# Patient Record
Sex: FEMALE | Race: WHITE | NOT HISPANIC OR LATINO | Employment: UNEMPLOYED | URBAN - METROPOLITAN AREA
[De-identification: names, ages, dates, MRNs, and addresses within clinical notes are randomized per-mention and may not be internally consistent; named-entity substitution may affect disease eponyms.]

---

## 2017-03-21 DIAGNOSIS — F43.21 ADJUSTMENT DISORDER WITH DEPRESSED MOOD: ICD-10-CM

## 2017-03-21 NOTE — TELEPHONE ENCOUNTER
Sertraline     Last Written Prescription Date: 2/1/2016  Last Fill Quantity: 90, # refills: 3  Last Office Visit with G primary care provider: 11/17/2016 SP        Last PHQ-9 score on record=   PHQ-9 SCORE 11/17/2016   Total Score -   Total Score 1

## 2017-06-28 DIAGNOSIS — F43.21 ADJUSTMENT DISORDER WITH DEPRESSED MOOD: ICD-10-CM

## 2017-06-28 NOTE — TELEPHONE ENCOUNTER
Sertraline     Last Written Prescription Date: 03/27/016  Last Fill Quantity: 90, # refills: 0  Last Office Visit with [facility] primary care provider:  11/17/2016        Last PHQ-9 score on record=   PHQ-9 SCORE 11/17/2016   Total Score -   Total Score 1     PHQ-9 SCORE 3/19/2015 4/12/2016 11/17/2016   Total Score 3 - -   Total Score - 4 1     JUNG-7 SCORE 9/11/2014 4/12/2016 11/17/2016   Total Score 3 - -   Total Score - 1 0       Omer STOKES (R)

## 2017-07-10 ENCOUNTER — OFFICE VISIT (OUTPATIENT)
Dept: FAMILY MEDICINE | Facility: CLINIC | Age: 32
End: 2017-07-10
Payer: COMMERCIAL

## 2017-07-10 VITALS
WEIGHT: 180.6 LBS | HEART RATE: 72 BPM | BODY MASS INDEX: 30.09 KG/M2 | HEIGHT: 65 IN | DIASTOLIC BLOOD PRESSURE: 60 MMHG | SYSTOLIC BLOOD PRESSURE: 90 MMHG

## 2017-07-10 DIAGNOSIS — Z30.41 ENCOUNTER FOR SURVEILLANCE OF CONTRACEPTIVE PILLS: ICD-10-CM

## 2017-07-10 DIAGNOSIS — F43.21 ADJUSTMENT DISORDER WITH DEPRESSED MOOD: Primary | ICD-10-CM

## 2017-07-10 DIAGNOSIS — K21.9 GASTROESOPHAGEAL REFLUX DISEASE WITHOUT ESOPHAGITIS: ICD-10-CM

## 2017-07-10 PROCEDURE — 99213 OFFICE O/P EST LOW 20 MIN: CPT | Performed by: FAMILY MEDICINE

## 2017-07-10 RX ORDER — NICOTINE POLACRILEX 4 MG/1
20 GUM, CHEWING ORAL DAILY
Qty: 90 TABLET | Refills: 3 | Status: SHIPPED | OUTPATIENT
Start: 2017-07-10 | End: 2020-04-20

## 2017-07-10 RX ORDER — ACETAMINOPHEN AND CODEINE PHOSPHATE 120; 12 MG/5ML; MG/5ML
1 SOLUTION ORAL DAILY
Qty: 84 TABLET | Refills: 3 | Status: SHIPPED | OUTPATIENT
Start: 2017-07-10 | End: 2018-07-21

## 2017-07-10 ASSESSMENT — ANXIETY QUESTIONNAIRES
IF YOU CHECKED OFF ANY PROBLEMS ON THIS QUESTIONNAIRE, HOW DIFFICULT HAVE THESE PROBLEMS MADE IT FOR YOU TO DO YOUR WORK, TAKE CARE OF THINGS AT HOME, OR GET ALONG WITH OTHER PEOPLE: NOT DIFFICULT AT ALL
2. NOT BEING ABLE TO STOP OR CONTROL WORRYING: NOT AT ALL
7. FEELING AFRAID AS IF SOMETHING AWFUL MIGHT HAPPEN: NOT AT ALL
6. BECOMING EASILY ANNOYED OR IRRITABLE: SEVERAL DAYS
5. BEING SO RESTLESS THAT IT IS HARD TO SIT STILL: NOT AT ALL
GAD7 TOTAL SCORE: 1
1. FEELING NERVOUS, ANXIOUS, OR ON EDGE: NOT AT ALL
3. WORRYING TOO MUCH ABOUT DIFFERENT THINGS: NOT AT ALL

## 2017-07-10 ASSESSMENT — PATIENT HEALTH QUESTIONNAIRE - PHQ9: 5. POOR APPETITE OR OVEREATING: NOT AT ALL

## 2017-07-10 NOTE — PROGRESS NOTES
Subjective:  Obdulia Black is a 32 year old female   Chief Complaint   Patient presents with     Depression     recheck/refill on medication     In general she is doing well with her depression and anxiety. Her 11-month-old infant is still waking up every 2 or 3 hours during the night and won't settle herself, so Obdulia ends up going in and rocking her to see back to sleep. Because of this her sleep is disrupted and she does have ongoing fatigue and tiredness. She has tried letting the baby cry himself back to sleep but it has not been effective to this point. Current symptoms include:  PHQ-9 ([employer]) 11/17/2016   No Interest In Doing Things    Feeling Depressed    Trouble Sleeping    Tired / No Energy    No appetite or Over-Eating    Feeling Bad about Self    Trouble Concentrating    Moving Slow or Restless    Suicidal Thoughts    Total Score    1.  Little interest or pleasure in doing things 0   2.  Feeling down, depressed, or hopeless 0   3.  Trouble falling or staying asleep, or sleeping too much 0   4.  Feeling tired or having little energy 1   5.  Poor appetite or overeating 0   6.  Feeling bad about yourself 0   7.  Trouble concentrating 0   8.  Moving slowly or restless 0   9.  Suicidal or self-harm thoughts 0   PHQ-9 Total Score 1   Difficulty at work, home, or with people Not difficult at all     PHQ-9 ([employer]) 7/10/2017   No Interest In Doing Things    Feeling Depressed    Trouble Sleeping    Tired / No Energy    No appetite or Over-Eating    Feeling Bad about Self    Trouble Concentrating    Moving Slow or Restless    Suicidal Thoughts    Total Score    1.  Little interest or pleasure in doing things 0   2.  Feeling down, depressed, or hopeless 1   3.  Trouble falling or staying asleep, or sleeping too much 1   4.  Feeling tired or having little energy 2   5.  Poor appetite or overeating 0   6.  Feeling bad about yourself 0   7.  Trouble concentrating 0   8.  Moving slowly or restless 0   9.  Suicidal or  self-harm thoughts 0   PHQ-9 Total Score 4   Difficulty at work, home, or with people Not difficult at all     JUNG-7   [employer], 2002; Used with Permission) 7/10/2017   Over the last 2 weeks, how often have you been bothered by feeling nervous, anxious or on edge?    Over the last 2 weeks, how often have you been bothered by not being able to stop or control worrying?    Over the last 2 weeks, how often have you been bothered by worrying too much about different things?    Over the last 2 weeks, how often have you been bothered by trouble relaxing?    Over the last 2 weeks, how often have you been bothered by being so restless that it is hard to sit still?    Over the last 2 weeks, how often have you been bothered by becoming easily annoyed or irritable?    Over the last 2 weeks, how often have you been bothered by feeling afraid as if something awful might happen?    JUNG-7 Total Score =     1. Feeling nervous, anxious, or on edge 0   2. Not being able to stop or control worrying 0   3. Worrying too much about different things 0   4. Trouble relaxing 0   5. Being so restless that it is hard to sit still 0   6. Becoming easily annoyed or irritable 1   7. Feeling afraid, as if something awful might happen 0   JUNG-7 Total Score 1   If you checked any problems, how difficult have they made it for you to do your work, take care of things at home, or get along with other people? Not difficult at all       She also has to fight could refill her birth control pills. She did have a Pap smear at her postpartum visit in September and her Pap smear is normal. The pill is working well without side effects    Encounter Diagnoses   Name Primary?     Adjustment disorder with depressed mood Yes     Gastroesophageal reflux disease without esophagitis      Encounter for surveillance of contraceptive pills        ROS:other than noted above, general, HEENT, respiratory, cardiac, gastrointestinal systems are negative    Medical,  surgical, social, and family histories, medications and allergies reviewed and updated.    Objective:  Exam:    GENERAL APPEARANCE ADULT: Alert, no acute distress  EYES: PERRL, EOM normal, conjunctiva and lids normal  PSYCH: mentation appears normal., affect and mood normal      ASSESSMENT:  1. Adjustment disorder with depressed mood    2. Gastroesophageal reflux disease without esophagitis    3. Encounter for surveillance of contraceptive pills        PLAN:  Orders Placed This Encounter     sertraline (ZOLOFT) 50 MG tablet     omeprazole 20 MG tablet     norethindrone (MICRONOR) 0.35 MG per tablet       Patient Instructions   Continue the same medications.   Recheck in a year, sooner if any problems

## 2017-07-10 NOTE — MR AVS SNAPSHOT
After Visit Summary   7/10/2017    Obdulia Black    MRN: [id number]           Patient Information     Date Of Birth          1985        Visit Information        Provider Department      7/10/2017 8:00 AM HARIS Dickey MD [facility]        Today's Diagnoses     Adjustment disorder with depressed mood        Gastroesophageal reflux disease without esophagitis        Routine postpartum follow-up          Care Instructions    Continue the same medications.   Recheck in a year, sooner if any problems          Follow-ups after your visit        Who to contact     If you have questions or need follow up information about today's clinic visit or your schedule please contact [facility] directly at [phone].  Normal or non-critical lab and imaging results will be communicated to you by MyChart, letter or phone within 4 business days after the clinic has received the results. If you do not hear from us within 7 days, please contact the clinic through [employer] or phone. If you have a critical or abnormal lab result, we will notify you by phone as soon as possible.  Submit refill requests through [employer] or call your pharmacy and they will forward the refill request to us. Please allow 3 business days for your refill to be completed.          Additional Information About Your Visit        MyChart Information     [employer] gives you secure access to your electronic health record. If you see a primary care provider, you can also send messages to your care team and make appointments. If you have questions, please call your primary care clinic.  If you do not have a primary care provider, please call [phone] and they will assist you.        Care EveryWhere ID     This is your Care EveryWhere ID. This could be used by other organizations to access your [place] medical records  [id number]        Your Vitals Were     Pulse Height BMI (Body Mass Index)             72 5'  "4.5\" (1.638 m) 30.52 kg/m2          Blood Pressure from Last 3 Encounters:   07/10/17 90/60   11/17/16 116/71   09/21/16 121/72    Weight from Last 3 Encounters:   07/10/17 180 lb 9.6 oz (81.9 kg)   11/17/16 189 lb 9.6 oz (86 kg)   09/21/16 183 lb 3.2 oz (83.1 kg)              Today, you had the following     No orders found for display         Today's Medication Changes          These changes are accurate as of: 7/10/17  8:22 AM.  If you have any questions, ask your nurse or doctor.               These medicines have changed or have updated prescriptions.        Dose/Directions    sertraline 50 MG tablet   Commonly known as:  ZOLOFT   This may have changed:  See the new instructions.   Used for:  Adjustment disorder with depressed mood        Dose:  50 mg   Take 1 tablet (50 mg) by mouth daily   Quantity:  90 tablet   Refills:  3            Where to get your medicines      These medications were sent to [id number] [address] [address]    Hours:  Closed 10-16-08 business to [facility] Phone:  [phone]     norethindrone 0.35 MG per tablet    omeprazole 20 MG tablet    sertraline 50 MG tablet                Primary Care Provider Office Phone # Fax #    R Kennedy Dickey [phone]       [address] [address]        Equal Access to Services     NANCI CASEY AH: Hadii ricardo varma hadasho Soomaali, waaxda luqadaha, qaybta kaalmada adeegyada, ranjeet heredia. So [facility] [phone].    ATENCIÓN: Si habla royceañol, tiene a fox disposición servicios gratuitos de asistencia lingüística. Llame al [phone].    We comply with applicable federal civil rights laws and [place] laws. We do not discriminate on the basis of race, color, national origin, age, disability sex, sexual orientation or gender identity.            Thank you!     Thank you for choosing [facility]  for your care. Our goal is always to provide you with excellent care. Hearing back from our patients is one way we can continue to improve our services. Please take a few minutes to complete the written survey that you may receive in the mail after your visit with us. Thank you!             Your Updated Medication List - Protect others around you: Learn how to safely use, store and throw away your medicines at www.disposemymeds.org.          This list is accurate as of: 7/10/17  8:22 AM.  Always use your most recent med list.                   Brand Name Dispense Instructions for use Diagnosis    cetirizine 10 MG tablet    zyrTEC    30 tablet    Take 1 tablet (10 mg) by mouth every evening        fluticasone 50 MCG/ACT spray    FLONASE    1 Package    Spray 2 sprays in nostril daily    Allergic rhinitis       ibuprofen 200 MG tablet    ADVIL/MOTRIN     Take 3 tablets (600 mg) by mouth every 6 hours as needed for mild pain    Vaginal delivery       norethindrone 0.35 MG per tablet    MICRONOR    84 tablet    Take 1 tablet (0.35 mg) by mouth daily    Routine postpartum follow-up       omeprazole 20 MG tablet     90 tablet    Take 1 tablet (20 mg) by mouth daily    Gastroesophageal reflux disease without esophagitis       prenatal multivitamin  plus iron 27-0.8 MG Tabs per tablet      Take 1 tablet by mouth daily        sertraline 50 MG tablet    ZOLOFT    90 tablet    Take 1 tablet (50 mg) by mouth daily    Adjustment disorder with depressed mood

## 2017-07-10 NOTE — NURSING NOTE
"Chief Complaint   Patient presents with     Depression     recheck/refill on medication       Initial BP 90/60 (BP Location: Right arm, Patient Position: Chair, Cuff Size: Adult Large)  Pulse 72  Ht 5' 4.5\" (1.638 m)  Wt 180 lb 9.6 oz (81.9 kg)  BMI 30.52 kg/m2 Estimated body mass index is 30.52 kg/(m^2) as calculated from the following:    Height as of this encounter: 5' 4.5\" (1.638 m).    Weight as of this encounter: 180 lb 9.6 oz (81.9 kg).  Medication Reconciliation: complete     Mary Cristina, CMA      "

## 2017-07-11 ASSESSMENT — ASTHMA QUESTIONNAIRES: ACT_TOTALSCORE: 25

## 2017-07-11 ASSESSMENT — ANXIETY QUESTIONNAIRES: GAD7 TOTAL SCORE: 1

## 2017-07-11 ASSESSMENT — PATIENT HEALTH QUESTIONNAIRE - PHQ9: SUM OF ALL RESPONSES TO PHQ QUESTIONS 1-9: 4

## 2018-04-04 ENCOUNTER — APPOINTMENT (OUTPATIENT)
Dept: OCCUPATIONAL MEDICINE | Facility: CLINIC | Age: 33
End: 2018-04-04

## 2018-04-04 PROCEDURE — 86580 TB INTRADERMAL TEST: CPT | Performed by: PHYSICIAN ASSISTANT

## 2018-04-16 ENCOUNTER — APPOINTMENT (OUTPATIENT)
Dept: OCCUPATIONAL MEDICINE | Facility: CLINIC | Age: 33
End: 2018-04-16

## 2018-04-16 PROCEDURE — 86580 TB INTRADERMAL TEST: CPT | Performed by: PHYSICIAN ASSISTANT

## 2018-07-17 DIAGNOSIS — K21.9 ESOPHAGEAL REFLUX: Primary | ICD-10-CM

## 2018-07-17 NOTE — TELEPHONE ENCOUNTER
"Requested Prescriptions   Pending Prescriptions Disp Refills     omeprazole (PRILOSEC) 20 MG CR capsule [Pharmacy Med Name: OMEPRAZOLE 20 MG    CAP APOT]  Last Written Prescription Date:  7/10/2017  Last Fill Quantity: 90,  # refills: 3   Last office visit: 7/10/2017 with prescribing provider:  Post   Future Office Visit:     90 capsule 2     Sig: TAKE ONE CAPSULE BY MOUTH ONE TIME DAILY    PPI Protocol Failed    7/17/2018 12:07 PM       Failed - Recent (12 mo) or future (30 days) visit within the authorizing provider's specialty    Patient had office visit in the last 12 months or has a visit in the next 30 days with authorizing provider or within the authorizing provider's specialty.  See \"Patient Info\" tab in inbasket, or \"Choose Columns\" in Meds & Orders section of the refill encounter.           Passed - Not on Clopidogrel (unless Pantoprazole ordered)       Passed - No diagnosis of osteoporosis on record       Passed - Patient is age 18 or older       Passed - No active pregnacy on record       Passed - No positive pregnancy test in past 12 months          "

## 2018-07-21 DIAGNOSIS — Z30.41 ENCOUNTER FOR SURVEILLANCE OF CONTRACEPTIVE PILLS: ICD-10-CM

## 2018-07-24 RX ORDER — ACETAMINOPHEN AND CODEINE PHOSPHATE 120; 12 MG/5ML; MG/5ML
1 SOLUTION ORAL DAILY
Qty: 84 TABLET | Refills: 0 | Status: SHIPPED | OUTPATIENT
Start: 2018-07-24 | End: 2018-10-15

## 2018-08-16 ENCOUNTER — OFFICE VISIT (OUTPATIENT)
Dept: FAMILY MEDICINE | Facility: CLINIC | Age: 33
End: 2018-08-16
Payer: COMMERCIAL

## 2018-08-16 VITALS
OXYGEN SATURATION: 100 % | BODY MASS INDEX: 33.52 KG/M2 | SYSTOLIC BLOOD PRESSURE: 102 MMHG | DIASTOLIC BLOOD PRESSURE: 70 MMHG | RESPIRATION RATE: 16 BRPM | HEIGHT: 63 IN | TEMPERATURE: 97.3 F | HEART RATE: 80 BPM | WEIGHT: 189.2 LBS

## 2018-08-16 DIAGNOSIS — K21.9 GASTROESOPHAGEAL REFLUX DISEASE WITHOUT ESOPHAGITIS: ICD-10-CM

## 2018-08-16 DIAGNOSIS — F43.21 ADJUSTMENT DISORDER WITH DEPRESSED MOOD: ICD-10-CM

## 2018-08-16 PROCEDURE — 99214 OFFICE O/P EST MOD 30 MIN: CPT | Performed by: FAMILY MEDICINE

## 2018-08-16 RX ORDER — SERTRALINE HYDROCHLORIDE 100 MG/1
100 TABLET, FILM COATED ORAL DAILY
Qty: 90 TABLET | Refills: 3 | Status: SHIPPED | OUTPATIENT
Start: 2018-08-16 | End: 2019-09-03

## 2018-08-16 ASSESSMENT — ANXIETY QUESTIONNAIRES
7. FEELING AFRAID AS IF SOMETHING AWFUL MIGHT HAPPEN: NOT AT ALL
1. FEELING NERVOUS, ANXIOUS, OR ON EDGE: NOT AT ALL
GAD7 TOTAL SCORE: 2
5. BEING SO RESTLESS THAT IT IS HARD TO SIT STILL: NOT AT ALL
IF YOU CHECKED OFF ANY PROBLEMS ON THIS QUESTIONNAIRE, HOW DIFFICULT HAVE THESE PROBLEMS MADE IT FOR YOU TO DO YOUR WORK, TAKE CARE OF THINGS AT HOME, OR GET ALONG WITH OTHER PEOPLE: NOT DIFFICULT AT ALL
2. NOT BEING ABLE TO STOP OR CONTROL WORRYING: NOT AT ALL
3. WORRYING TOO MUCH ABOUT DIFFERENT THINGS: NOT AT ALL
6. BECOMING EASILY ANNOYED OR IRRITABLE: MORE THAN HALF THE DAYS

## 2018-08-16 ASSESSMENT — PATIENT HEALTH QUESTIONNAIRE - PHQ9: 5. POOR APPETITE OR OVEREATING: NOT AT ALL

## 2018-08-16 NOTE — PROGRESS NOTES
Subjective:  Obdulia Black is a 33 year old female   Chief Complaint   Patient presents with     Depression     recheck/refill     Refill Request     for omeprazole and BCP     Depression has been a little bit worse.  Current symptoms include  PHQ-9 ([employer]) 8/16/2018   No Interest In Doing Things    Feeling Depressed    Trouble Sleeping    Tired / No Energy    No appetite or Over-Eating    Feeling Bad about Self    Trouble Concentrating    Moving Slow or Restless    Suicidal Thoughts    Total Score    1.  Little interest or pleasure in doing things 1   2.  Feeling down, depressed, or hopeless 1   3.  Trouble falling or staying asleep, or sleeping too much 2   4.  Feeling tired or having little energy 2   5.  Poor appetite or overeating 0   6.  Feeling bad about yourself 1   7.  Trouble concentrating 0   8.  Moving slowly or restless 0   9.  Suicidal or self-harm thoughts 0   PHQ-9 Total Score 7   Difficulty at work, home, or with people Somewhat difficult   She denies any significant side effects from sertraline    GERD: This is well controlled as long as she takes the omeprazole    Encounter Diagnoses   Name Primary?     Gastroesophageal reflux disease without esophagitis      Adjustment disorder with depressed mood        ROS:other than noted above, general, HEENT, respiratory, cardiac, gastrointestinal systems are negative    Medical, surgical, social, and family histories, medications and allergies reviewed and updated.    Objective:  Exam:    GENERAL APPEARANCE ADULT: Alert, no acute distress  EYES: PERRL, EOM normal, conjunctiva and lids normal  RESP: lungs clear to auscultation   CV: normal rate, regular rhythm, no murmur or gallop  ABDOMEN: soft, no organomegaly, masses or tenderness  PSYCH: mentation appears normal., affect and mood normal      ASSESSMENT:  1. Gastroesophageal reflux disease without esophagitis    2. Adjustment disorder with depressed mood        PLAN:  Orders Placed This Encounter      omeprazole (PRILOSEC) 20 MG CR capsule     sertraline (ZOLOFT) 100 MG tablet     We will increase the dose of sertraline to 100 mg daily to see if we can improve her depression symptoms.  Will follow up with a phone call in 6 weeks to see how this is working

## 2018-08-16 NOTE — MR AVS SNAPSHOT
"              After Visit Summary   8/16/2018    Obdulia Black    MRN: [id number]           Patient Information     Date Of Birth          1985        Visit Information        Provider Department      8/16/2018 10:00 AM HARIS Dickey MD [facility]        Today's Diagnoses     Gastroesophageal reflux disease without esophagitis        Adjustment disorder with depressed mood           Follow-ups after your visit        Who to contact     If you have questions or need follow up information about today's clinic visit or your schedule please contact [facility] directly at [phone].  Normal or non-critical lab and imaging results will be communicated to you by [employer], letter or phone within 4 business days after the clinic has received the results. If you do not hear from us within 7 days, please contact the clinic through [employer] or phone. If you have a critical or abnormal lab result, we will notify you by phone as soon as possible.  Submit refill requests through [employer] or call your pharmacy and they will forward the refill request to us. Please allow 3 business days for your refill to be completed.          Additional Information About Your Visit        MyChart Information     [employer] gives you secure access to your electronic health record. If you see a primary care provider, you can also send messages to your care team and make appointments. If you have questions, please call your primary care clinic.  If you do not have a primary care provider, please call [phone] and they will assist you.        Care EveryWhere ID     This is your Care EveryWhere ID. This could be used by other organizations to access your [place] medical records  [id number]        Your Vitals Were     Pulse Temperature Respirations Height Pulse Oximetry BMI (Body Mass Index)    80 97.3  F (36.3  C) (Tympanic) 16 5' 3\" (1.6 m) 100% 33.52 kg/m2       Blood Pressure from Last 3 Encounters: "   08/16/18 102/70   07/10/17 90/60   11/17/16 116/71    Weight from Last 3 Encounters:   08/16/18 189 lb 3.2 oz (85.8 kg)   07/10/17 180 lb 9.6 oz (81.9 kg)   11/17/16 189 lb 9.6 oz (86 kg)              Today, you had the following     No orders found for display         Today's Medication Changes          These changes are accurate as of 8/16/18 10:27 AM.  If you have any questions, ask your nurse or doctor.               These medicines have changed or have updated prescriptions.        Dose/Directions    * omeprazole 20 MG tablet   This may have changed:  Another medication with the same name was changed. Make sure you understand how and when to take each.   Used for:  Gastroesophageal reflux disease without esophagitis   Changed by:  HARIS Dickey MD        Dose:  20 mg   Take 1 tablet (20 mg) by mouth daily   Quantity:  90 tablet   Refills:  3       * omeprazole 20 MG CR capsule   Commonly known as:  priLOSEC   This may have changed:  See the new instructions.   Used for:  Gastroesophageal reflux disease without esophagitis   Changed by:  HARIS Dickey MD        Dose:  20 mg   Take 1 capsule (20 mg) by mouth daily   Quantity:  90 capsule   Refills:  3       sertraline 100 MG tablet   Commonly known as:  ZOLOFT   This may have changed:    - medication strength  - how much to take   Used for:  Adjustment disorder with depressed mood   Changed by:  HARIS Dickey MD        Dose:  100 mg   Take 1 tablet (100 mg) by mouth daily   Quantity:  90 tablet   Refills:  3       * Notice:  This list has 2 medication(s) that are the same as other medications prescribed for you. Read the directions carefully, and ask your doctor or other care provider to review them with you.         Where to get your medicines      These medications were sent to [id number] [address] [facility]  [address] [facility] [address]    Hours:  Closed 10-16-08 business to [facility] Phone:  [phone]      omeprazole 20 MG CR capsule    sertraline 100 MG tablet                Primary Care Provider Office Phone # Fax #    R Kennedy Dickey [phone] [address] [facility] [address]        Equal Access to Services     CHRISTINE CASEY : Hadii ricardo varma jose eduardoo Sojanali, waaxda luqadaha, qaybta kaalmada adelaneyada, ranjeet correian kylie novoa laTristinmahogany heredia. So [facility] [phone].    ATENCIÓN: Si habla español, tiene a fox disposición servicios gratuitos de asistencia lingüística. Llame al [phone].    We comply with applicable federal civil rights laws and [place] laws. We do not discriminate on the basis of race, color, national origin, age, disability, sex, sexual orientation, or gender identity.            Thank you!     Thank you for choosing [facility]  for your care. Our goal is always to provide you with excellent care. Hearing back from our patients is one way we can continue to improve our services. Please take a few minutes to complete the written survey that you may receive in the mail after your visit with us. Thank you!             Your Updated Medication List - Protect others around you: Learn how to safely use, store and throw away your medicines at www.disposemymeds.org.          This list is accurate as of 8/16/18 10:27 AM.  Always use your most recent med list.                   Brand Name Dispense Instructions for use Diagnosis    cetirizine 10 MG tablet    zyrTEC    30 tablet    Take 1 tablet (10 mg) by mouth every evening        fluticasone 50 MCG/ACT spray    FLONASE    1 Package    Spray 2 sprays in nostril daily    Allergic rhinitis       ibuprofen 200 MG tablet    ADVIL/MOTRIN     Take 3 tablets (600 mg) by mouth every 6 hours as needed for mild pain    Vaginal delivery       norethindrone 0.35 MG per tablet    MICRONOR    84 tablet    Take 1 tablet (0.35 mg) by mouth daily Appt DUE Sept 2018    Encounter for surveillance of contraceptive pills        * omeprazole 20 MG tablet     90 tablet    Take 1 tablet (20 mg) by mouth daily    Gastroesophageal reflux disease without esophagitis       * omeprazole 20 MG CR capsule    priLOSEC    90 capsule    Take 1 capsule (20 mg) by mouth daily    Gastroesophageal reflux disease without esophagitis       prenatal multivitamin plus iron 27-0.8 MG Tabs per tablet      Take 1 tablet by mouth daily        sertraline 100 MG tablet    ZOLOFT    90 tablet    Take 1 tablet (100 mg) by mouth daily    Adjustment disorder with depressed mood       * Notice:  This list has 2 medication(s) that are the same as other medications prescribed for you. Read the directions carefully, and ask your doctor or other care provider to review them with you.

## 2018-08-17 ASSESSMENT — PATIENT HEALTH QUESTIONNAIRE - PHQ9: SUM OF ALL RESPONSES TO PHQ QUESTIONS 1-9: 7

## 2018-08-17 ASSESSMENT — ANXIETY QUESTIONNAIRES: GAD7 TOTAL SCORE: 2

## 2018-10-08 ENCOUNTER — TELEPHONE (OUTPATIENT)
Dept: FAMILY MEDICINE | Facility: CLINIC | Age: 33
End: 2018-10-08

## 2018-10-09 ASSESSMENT — PATIENT HEALTH QUESTIONNAIRE - PHQ9: SUM OF ALL RESPONSES TO PHQ QUESTIONS 1-9: 1

## 2018-10-15 DIAGNOSIS — Z30.41 ENCOUNTER FOR SURVEILLANCE OF CONTRACEPTIVE PILLS: ICD-10-CM

## 2018-10-15 NOTE — TELEPHONE ENCOUNTER
"Requested Prescriptions   Pending Prescriptions Disp Refills     norethindrone (MICRONOR) 0.35 MG per tablet [Pharmacy Med Name: NORETHINDRON 0.35MG TAB SANDRITA]  Last Written Prescription Date:  07/24/18  Last Fill Quantity: 84,  # refills: 0  Last office visit: 8/16/2018 with prescribing provider:  08/16/18   Future Office Visit:     84 tablet 0     Sig: TAKE ONE TABLET BY MOUTH ONE TIME DAILY MUST SEE DOCTOR FOR REFILLS    Contraceptives Protocol Passed    10/15/2018 12:07 PM       Passed - Patient is not a current smoker if age is 35 or older       Passed - Recent (12 mo) or future (30 days) visit within the authorizing provider's specialty    Patient had office visit in the last 12 months or has a visit in the next 30 days with authorizing provider or within the authorizing provider's specialty.  See \"Patient Info\" tab in inbasket, or \"Choose Columns\" in Meds & Orders section of the refill encounter.           Passed - No active pregnancy on record       Passed - No positive pregnancy test in past 12 months          "

## 2018-10-16 RX ORDER — ACETAMINOPHEN AND CODEINE PHOSPHATE 120; 12 MG/5ML; MG/5ML
SOLUTION ORAL
Qty: 84 TABLET | Refills: 2 | Status: SHIPPED | OUTPATIENT
Start: 2018-10-16 | End: 2019-09-03

## 2018-10-16 NOTE — TELEPHONE ENCOUNTER
Prescription approved per [facility] Refill Protocol.  Dayanara DURANT RN

## 2019-09-03 ENCOUNTER — OFFICE VISIT (OUTPATIENT)
Dept: FAMILY MEDICINE | Facility: CLINIC | Age: 34
End: 2019-09-03
Payer: COMMERCIAL

## 2019-09-03 VITALS
RESPIRATION RATE: 14 BRPM | OXYGEN SATURATION: 99 % | DIASTOLIC BLOOD PRESSURE: 72 MMHG | WEIGHT: 171 LBS | HEART RATE: 72 BPM | HEIGHT: 63 IN | BODY MASS INDEX: 30.3 KG/M2 | TEMPERATURE: 97.3 F | SYSTOLIC BLOOD PRESSURE: 130 MMHG

## 2019-09-03 DIAGNOSIS — Z30.41 ENCOUNTER FOR SURVEILLANCE OF CONTRACEPTIVE PILLS: ICD-10-CM

## 2019-09-03 DIAGNOSIS — M79.642 BILATERAL HAND PAIN: Primary | ICD-10-CM

## 2019-09-03 DIAGNOSIS — M79.641 BILATERAL HAND PAIN: Primary | ICD-10-CM

## 2019-09-03 DIAGNOSIS — K21.9 GASTROESOPHAGEAL REFLUX DISEASE WITHOUT ESOPHAGITIS: ICD-10-CM

## 2019-09-03 DIAGNOSIS — F43.21 ADJUSTMENT DISORDER WITH DEPRESSED MOOD: ICD-10-CM

## 2019-09-03 PROCEDURE — 99214 OFFICE O/P EST MOD 30 MIN: CPT | Performed by: NURSE PRACTITIONER

## 2019-09-03 RX ORDER — ACETAMINOPHEN AND CODEINE PHOSPHATE 120; 12 MG/5ML; MG/5ML
SOLUTION ORAL
Qty: 84 TABLET | Refills: 2 | Status: CANCELLED | OUTPATIENT
Start: 2019-09-03

## 2019-09-03 RX ORDER — SERTRALINE HYDROCHLORIDE 100 MG/1
100 TABLET, FILM COATED ORAL DAILY
Qty: 90 TABLET | Refills: 3 | Status: SHIPPED | OUTPATIENT
Start: 2019-09-03 | End: 2020-07-14

## 2019-09-03 RX ORDER — DESOGESTREL AND ETHINYL ESTRADIOL 0.15-0.03
1 KIT ORAL DAILY
Qty: 84 TABLET | Refills: 3 | Status: SHIPPED | OUTPATIENT
Start: 2019-09-03 | End: 2020-07-14

## 2019-09-03 ASSESSMENT — ANXIETY QUESTIONNAIRES
3. WORRYING TOO MUCH ABOUT DIFFERENT THINGS: SEVERAL DAYS
GAD7 TOTAL SCORE: 5
1. FEELING NERVOUS, ANXIOUS, OR ON EDGE: NOT AT ALL
4. TROUBLE RELAXING: SEVERAL DAYS
2. NOT BEING ABLE TO STOP OR CONTROL WORRYING: SEVERAL DAYS
GAD7 TOTAL SCORE: 5
GAD7 TOTAL SCORE: 5
6. BECOMING EASILY ANNOYED OR IRRITABLE: MORE THAN HALF THE DAYS
5. BEING SO RESTLESS THAT IT IS HARD TO SIT STILL: NOT AT ALL
7. FEELING AFRAID AS IF SOMETHING AWFUL MIGHT HAPPEN: NOT AT ALL
7. FEELING AFRAID AS IF SOMETHING AWFUL MIGHT HAPPEN: NOT AT ALL

## 2019-09-03 ASSESSMENT — PATIENT HEALTH QUESTIONNAIRE - PHQ9
SUM OF ALL RESPONSES TO PHQ QUESTIONS 1-9: 11
10. IF YOU CHECKED OFF ANY PROBLEMS, HOW DIFFICULT HAVE THESE PROBLEMS MADE IT FOR YOU TO DO YOUR WORK, TAKE CARE OF THINGS AT HOME, OR GET ALONG WITH OTHER PEOPLE: SOMEWHAT DIFFICULT
SUM OF ALL RESPONSES TO PHQ QUESTIONS 1-9: 11

## 2019-09-03 ASSESSMENT — MIFFLIN-ST. JEOR: SCORE: 1434.78

## 2019-09-03 ASSESSMENT — PAIN SCALES - GENERAL: PAINLEVEL: NO PAIN (0)

## 2019-09-03 NOTE — PROGRESS NOTES
Subjective     Obdulia Black is a 34 year old female who presents to clinic today for the following health issues:    HPI   Depression Followup    How are you doing with your depression since your last visit? No change    Are you having other symptoms that might be associated with depression? No    Have you had a significant life event?  No     Are you feeling anxious or having panic attacks?   No    Do you have any concerns with your use of alcohol or other drugs? No    Social History     Tobacco Use     Smoking status: Former Smoker     Last attempt to quit: 2008     Years since quittin.3     Smokeless tobacco: Never Used     Tobacco comment: 0   Substance Use Topics     Alcohol use: No     Alcohol/week: 0.0 oz     Comment: 1-2 weekly- quit with pregnancy     Drug use: No     Comment:       PHQ 2018 10/8/2018 9/3/2019   PHQ-9 Total Score 7 1 11   Q9: Thoughts of better off dead/self-harm past 2 weeks Not at all Not at all Not at all     JUNG-7 SCORE 7/10/2017 2018 9/3/2019   Total Score - - -   Total Score - - 5 (mild anxiety)   Total Score 1 2 5           PHQ-9 ([employer]) 9/3/2019   1.  Little interest or pleasure in doing things Several days   2.  Feeling down, depressed, or hopeless More than half the days   3.  Trouble falling or staying asleep, or sleeping too much More than half the days   4.  Feeling tired or having little energy Nearly every day   5.  Poor appetite or overeating More than half the days   6.  Feeling bad about yourself Several days   7.  Trouble concentrating Not at all   8.  Moving slowly or restless Not at all   9.  Suicidal or self-harm thoughts Not at all   [facility] TOTAL SC[facility]-----> 11 (Moderate depression)   Difficulty at work, home, or with people Somewhat difficult       Patient's last menstrual period was 2019.  Past Medical History:   Diagnosis Date     Chickenpox      Depression      Past Surgical History:   Procedure Laterality Date     MOUTH  SURGERY      wisdom teeth x 4     PE TUBES  3/02/92     SURGICAL HISTORY OF -   03    Excision of cyst on left middle finger     TONSILLECTOMY & ADENOIDECTOMY  1991        Allergies   Allergen Reactions     Sulfa Drugs Itching and Swelling       PROBLEMS TO ADD ON...  ORAL BIRTH CONTROL 20  Yrs  Was on micronor due to breastfeeding.   Wants to go back to apri  Carpal pain. Works as manager at [employer].   Bilateral hand numbness worse at night. Wears braces    Lab Results   Component Value Date    PAP NIL 2016    PAP NIL 2015    PAP NIL 2014     On sertraline for depression since teenage agers.   No live event that triggered it.   GERD for 10 +  Yrs omeprazole for years.   nexium and omeprazole alternating.   Carpal tunnel hands bilateral      Patient Active Problem List   Diagnosis     Esophageal reflux     Dysplasia of cervix, low grade (JAEL 1)     CARDIOVASCULAR SCREENING; LDL GOAL LESS THAN 160     Moderate major depression (H)     Tobacco use disorder     Allergic rhinitis     Prenatal care, first pregnancy     Numbness and tingling of both upper extremities while sleeping     Normal pregnancy     Vaginal delivery     Past Surgical History:   Procedure Laterality Date     MOUTH SURGERY      wisdom teeth x 4     PE TUBES  3/02/92     SURGICAL HISTORY OF -   03    Excision of cyst on left middle finger     TONSILLECTOMY & ADENOIDECTOMY  1991       Social History     Tobacco Use     Smoking status: Former Smoker     Last attempt to quit: 2008     Years since quittin.3     Smokeless tobacco: Never Used     Tobacco comment: 0   Substance Use Topics     Alcohol use: No     Alcohol/week: 0.0 oz     Comment: 1-2 weekly- quit with pregnancy     Family History   Problem Relation Age of Onset     Hypertension Maternal Grandmother      Other Cancer Maternal Grandmother         leukemia     Gastrointestinal Disease Maternal Grandmother         diverticulitis      "Diabetes Maternal Grandfather      Cerebrovascular Disease Maternal Grandfather      Heart Disease Maternal Grandfather         MI- open heart surgery     Eye Disorder Maternal Grandfather      Cancer Paternal Grandmother         leukemia     Prostate Cancer Paternal Grandfather      Alzheimer Disease Paternal Grandfather      Substance Abuse Paternal Grandfather         alcohol     Anemia Mother      Depression Mother      Rheumatoid Arthritis Father      Diabetes Maternal Uncle      Substance Abuse Brother         prescription pills           -------------------------------------  Reviewed and updated as needed this visit by Provider         Review of Systems   ROS COMP: Constitutional, HEENT, cardiovascular, pulmonary, GI, , musculoskeletal, neuro, skin, endocrine and psych systems are negative, except as otherwise noted.      Objective    /72   Pulse 72   Temp 97.3  F (36.3  C) (Tympanic)   Resp 14   Ht 1.6 m (5' 3\")   Wt 77.6 kg (171 lb)   LMP 08/30/2019   SpO2 99%   BMI 30.29 kg/m    Body mass index is 30.29 kg/m .  Physical Exam   GENERAL: healthy, alert and no distress  EYES: Eyes grossly normal to inspection, PERRL and conjunctivae and sclerae normal  HENT: ear canals and TM's normal, nose and mouth without ulcers or lesions  NECK: no adenopathy, no asymmetry, masses, or scars and thyroid normal to palpation  RESP: lungs clear to auscultation - no rales, rhonchi or wheezes  BREAST: normal without masses, tenderness or nipple discharge and no palpable axillary masses or adenopathy  CV: regular rate and rhythm, normal S1 S2, no S3 or S4, no murmur, click or rub, no peripheral edema and peripheral pulses strong  ABDOMEN: soft, nontender, no hepatosplenomegaly, no masses and bowel sounds normal  MS: normal range of motion, peripheral pulses normal and positive Tinel and Phalen bilaterally  SKIN: no suspicious lesions or rashes  NEURO: Normal strength and tone, mentation intact and speech " normal  PSYCH: mentation appears normal, affect normal/bright    Diagnostic Test Results:  Labs reviewed in Epic  Results for orders placed or performed in visit on 09/21/16   Pap imaged thin layer screen with HPV - recommended age 30 - 65 years (select HPV order below)   Result Value Ref Range    PAP NIL     Copath Report         Patient Name: JOY MAXWELL  MR#: [id number]  Specimen #: [id number]  Collected: 9/21/2016  Received: 9/21/2016  Reported: 9/23/2016 13:26  Ordering Phy(s): JENN WEINSTEIN    SPECIMEN/STAIN PROCESS:  Pap imaged thin layer prep screening (Surepath, FocalPoint with guided  screening)       Pap-Cyto x 1, HPV ordered x 1    SOURCE: Cervical, endocervical  ----------------------------------------------------------------   Pap imaged thin layer prep screening (Surepath, FocalPoint with guided  screening)  SPECIMEN ADEQUACY:  Satisfactory for evaluation.  -Transformation zone component present.    CYTOLOGIC INTERPRETATION:    Negative for Intraepithelial Lesion or Malignancy    Electronically signed out by:  KIRILL Lu (ASCP)    Processed and screened at [facility],  [facility]    CLINICAL HISTORY:  LMP: 11/4/2015  Post-partum, Previous normal pap  Date of Last Pap: 3/19/2015,    Papanicolaou Test Limitations:  Cervical  cytology is a screening test  with limited sensitivity; regular screening is critical for cancer  prevention; Pap tests are primarily effective for the  diagnosis/prevention of squamous cell carcinoma, not adenocarcinomas or  other cancers.    TESTING LAB LOCATION:  [address]  [phone]    COLLECTION SITE:  Client:  [facility]  Location: [facility]AN [facility]ILLER)     HPV High Risk Types DNA Cervical   Result Value Ref Range    HPV 16 DNA Negative NEG    HPV 18 DNA Negative NEG    Other HR HPV Negative NEG    Final Diagnosis        This patient's sample is negative for HPV DNA.   The American College of   Obstetricians and Gynecologists (ACOG) recommends any woman between 30-65 years   old who receives negative test results on both Pap cytology screening and HPV   DNA testing should be rescreened in 5 years.  (Note)  METHODOLOGY:  The Roche sean 4800 system uses automated extraction,  simultaneous amplification of HPV (L1 region) and beta-globin,  followed by  real time detection of fluorescent labeled HPV and beta  globin using specific oligonucleotide probes . The test specifically  identifies types HPV 16 DNA and HPV 18 DNA while concurrently  detecting the rest of the high risk types (31, 33, 35, 39, 45, 51,  52, 56, 58, 59, 66 or 68).    COMMENTS:  This test is not intended for use as a screening device  for women under age 30 with normal cervical cytology.  Results should  be correlated with cytologic and histologic findings. Close clinical  followup is recommended.    This test was developed and its perfo rmance characteristics  determined by the [facility], [facility]. It has not been cleared or approved by the  FDA. The laboratory is regulated under CLIA as qualified to perform  high-complexity testing. This test is used for clinical purposes. It  should not be regarded as investigational or for research.        Specimen Description Cervical Cells  [facility] [address]              Assessment & Plan   Problem List Items Addressed This Visit        Digestive    Esophageal reflux    Relevant Medications    omeprazole (PRILOSEC) 20 MG DR capsule    ranitidine (ZANTAC) 150 MG tablet    Other Relevant Orders    GASTROENTEROLOGY ADULT REF PROCEDURE ONLY      Other Visit Diagnoses     Bilateral hand pain    -  Primary    Relevant Orders    ORTHO  REFERRAL    NEUROLOGY ADULT REFERRAL    Encounter for surveillance of contraceptive pills        Relevant Medications    desogestrel-ethinyl  "estradiol (APRI) 0.15-30 MG-MCG tablet    Adjustment disorder with depressed mood        Relevant Medications    sertraline (ZOLOFT) 100 MG tablet         EGD with ongoing GERD-like symptoms recommended  Omeprazole 40 mg in the morning.  Ranitidine 150 to 300 mg at at bedtime  Chronic ongoing bilateral hand pain.  Ortho nonsurgical consult recommended.  Neurology consult with ongoing hand pain  EMG recommended with Ortho consult  Night splints recommended  Oral birth control refill x1      BMI:   Estimated body mass index is 30.29 kg/m  as calculated from the following:    Height as of this encounter: 1.6 m (5' 3\").    Weight as of this encounter: 77.6 kg (171 lb).   Weight management plan: Discussed healthy diet and exercise guidelines        Work on weight loss  Regular exercise  Call or return to the clinic with any worsening of symptoms or no resolution. Patient/Parent verbalized understanding and is in agreement. Medication side effects reviewed.   Current Outpatient Medications   Medication Sig Dispense Refill     cetirizine (ZYRTEC) 10 MG tablet Take 1 tablet (10 mg) by mouth every evening 30 tablet 1     desogestrel-ethinyl estradiol (APRI) 0.15-30 MG-MCG tablet Take 1 tablet by mouth daily 84 tablet 3     fluticasone (FLONASE) 50 MCG/ACT nasal spray Spray 2 sprays in nostril daily 1 Package 11     ibuprofen (ADVIL,MOTRIN) 200 MG tablet Take 3 tablets (600 mg) by mouth every 6 hours as needed for mild pain       omeprazole (PRILOSEC) 20 MG DR capsule Take 1 capsule (20 mg) by mouth daily 90 capsule 0     omeprazole 20 MG tablet Take 1 tablet (20 mg) by mouth daily 90 tablet 3     Prenatal Vit-Fe Fumarate-FA (PRENATAL MULTIVITAMIN  PLUS IRON) 27-0.8 MG TABS Take 1 tablet by mouth daily       ranitidine (ZANTAC) 150 MG tablet Take 1 tablet (150 mg) by mouth 2 times daily 180 tablet 0     sertraline (ZOLOFT) 100 MG tablet Take 1 tablet (100 mg) by mouth daily 90 tablet 3     Chart documentation with Dragon " Voice recognition Software. Although reviewed after completion, some words and grammatical errors may remain.    See Patient Instructions  Return in about 3 months (around 12/3/2019).    NICOLA Pittman [facility]      Answers for HPI/ROS submitted by the patient on 9/3/2019   If you checked off any problems, how difficult have these problems made it for you to do your work, take care of things at home, or get along with other people?: Somewhat difficult  PHQ9 TOTAL SCORE: 11  JUNG 7 TOTAL SCORE: 5

## 2019-09-03 NOTE — PATIENT INSTRUCTIONS
Patient Education     Birth Control: The Pill    Birth control pills contain hormones that help prevent pregnancy. The pills are prescribed by your healthcare provider. There are many types of birth control pills available. If you have side effects from one type of pill, tell your healthcare provider. He or she may be able to prescribe a pill that works better for you.  Pregnancy rates  Talk to your healthcare provider about the effectiveness of this birth control method.  Using the pill    Take one pill daily. Take it at around the same time each day.    Follow your healthcare provider s guidelines on when to start your first pack of pills. You may need to use another form of birth control for a week or more after you start.    Know what to do if you forget to take a pill. (Consult your healthcare provider or check the package.) If you miss more than one pill, you may need to use a backup method of birth control for a week or more.  Pros    Low pregnancy rate    No interruption to sex    Easy to use    Can help make periods more regular    May lower your risk of ovarian cysts and certain cancers    May decrease menstrual cramps, menstrual flow, and acne  Cons    Does not protect against sexually transmitted infection (STIs)    Requires taking a pill on time each day    May not work as well when taken with certain other medicines (check with your pharmacist)    May cause side effects such as nausea, irregular bleeding, headaches, breast tenderness, fatigue, or mood changes (these often go away within 3 months)    May increase the risk of blood clots, heart attack, and stroke  The pill may not be for you  The pill may not be for you if:    You are a smoker and over age 35    You have high blood pressure or gallbladder, liver, cerebrovascular  or heart disease    You have diabetes, migraines, blood clot in the vein or artery, lupus, depression, certain lipid disorders, or take medicines that interfere with the  pill  In these cases, discuss the risks with your healthcare provider.  Date Last Reviewed: 3/1/2017    5502-0756 [employer]. [address] [place] [address], [place] [address]. All rights reserved. This information is not intended as a substitute for professional medical care. Always follow your healthcare professional's instructions.           Patient Education     Birth Control: The Pill    Birth control pills contain hormones that help prevent pregnancy. The pills are prescribed by your healthcare provider. There are many types of birth control pills available. If you have side effects from one type of pill, tell your healthcare provider. He or she may be able to prescribe a pill that works better for you.  Pregnancy rates  Talk to your healthcare provider about the effectiveness of this birth control method.  Using the pill    Take one pill daily. Take it at around the same time each day.    Follow your healthcare provider s guidelines on when to start your first pack of pills. You may need to use another form of birth control for a week or more after you start.    Know what to do if you forget to take a pill. (Consult your healthcare provider or check the package.) If you miss more than one pill, you may need to use a backup method of birth control for a week or more.  Pros    Low pregnancy rate    No interruption to sex    Easy to use    Can help make periods more regular    May lower your risk of ovarian cysts and certain cancers    May decrease menstrual cramps, menstrual flow, and acne  Cons    Does not protect against sexually transmitted infection (STIs)    Requires taking a pill on time each day    May not work as well when taken with certain other medicines (check with your pharmacist)    May cause side effects such as nausea, irregular bleeding, headaches, breast tenderness, fatigue, or mood changes (these often go away within 3 months)    May increase the risk of blood clots, heart attack, and  stroke  The pill may not be for you  The pill may not be for you if:    You are a smoker and over age 35    You have high blood pressure or gallbladder, liver, cerebrovascular  or heart disease    You have diabetes, migraines, blood clot in the vein or artery, lupus, depression, certain lipid disorders, or take medicines that interfere with the pill  In these cases, discuss the risks with your healthcare provider.  Date Last Reviewed: 3/1/2017    1969-4949 [employer]. [address] [place] [address]. All rights reserved. This information is not intended as a substitute for professional medical care. Always follow your healthcare professional's instructions.           Patient Education     Tips to Control Acid Reflux    To control acid reflux, you ll need to make some basic diet and lifestyle changes. The simple steps outlined below may be all you ll need to ease discomfort.  Watch what you eat    Avoid fatty foods and spicy foods.    Eat fewer acidic foods, such as citrus and tomato-based foods. These can increase symptoms.    Limit drinking alcohol, caffeine, and fizzy beverages. All increase acid reflux.    Try limiting chocolate, peppermint, and spearmint. These can worsen acid reflux in some people.  Watch when you eat    Avoid lying down for 3 hours after eating.    Do not snack before going to bed.  Raise your head  Raising your head and upper body by 4 to 6 inches helps limit reflux when you re lying down. Put blocks under the head of your bed frame to raise it.  Other changes    Lose weight, if you need to    Don t exercise near bedtime    Avoid tight-fitting clothes    Limit aspirin and ibuprofen    Stop smoking   Date Last Reviewed: 7/1/2016 2000-2018 [employer]. [address] [place] [address]. All rights reserved. This information is not intended as a substitute for professional medical care. Always follow your healthcare professional's  instructions.           Patient Education     Lifestyle Changes for Controlling GERD  When you have GERD, stomach acid feels as if it s backing up toward your mouth. Whether or not you take medicine to control your GERD, your symptoms can often be improved with lifestyle changes. Talk to your healthcare provider about the following suggestions. These suggestions may help you get relief from your symptoms.      Raise your head  Reflux is more likely to strike when you re lying down flat, because stomach fluid can flow backward more easily. Raising the head of your bed 4 to 6 inches can help. To do this:    Slide blocks or books under the legs at the head of your bed. Or, place a wedge under the mattress. Many foam [employer] can make a suitable wedge for you. The wedge should run from your waist to the top of your head.    Don t just prop your head on several pillows. This increases pressure on your stomach. It can make GERD worse.  Watch your eating habits  Certain foods may increase the acid in your stomach or relax the lower esophageal sphincter. This makes GERD more likely. It s best to avoid the following if they cause you symptoms:    Coffee, tea, and carbonated drinks (with and without caffeine)    Fatty, fried, or spicy food    Mint, chocolate, onions, and tomatoes    Peppermint    Any other foods that seem to irritate your stomach or cause you pain  Relieve the pressure  Tips include the following:    Eat smaller meals, even if you have to eat more often.    Don t lie down right after you eat. Wait a few hours for your stomach to empty.    Avoid tight belts and tight-fitting clothes.    Lose excess weight.  Tobacco and alcohol  Avoid smoking tobacco and drinking alcohol. They can make GERD symptoms worse.  Date Last Reviewed: 7/1/2016 2000-2018 [employer]. [address] [place] [address], [place] [address]. All rights reserved. This information is not intended as a substitute for professional medical  care. Always follow your healthcare professional's instructions.           Patient Education     Medicines for GERD  Gastroesophageal reflux disease (GERD) can be treated with medicine. This may be done with a medicine you can buy over the counter. Or it may be done with a medicine that your healthcare provider has to prescribe. In some cases, both types may be used. Your provider will tell you what is best for your symptoms.  Antacids  Antacids work to weaken the acid in your stomach and can give you quick relief. You can buy many of them with no prescription. Antacids can be high in sodium. This may be a problem if you have high blood pressure. Some antacids also have aluminum. This should be avoided if you have long-term (chronic) kidney disease. So check with your provider first. Take antacids only when you need to, as advised by your provider.  Side effects: Constipation, diarrhea. If you take too much medicine, it can cause calcium to build up.   H-2 blockers  H-2 blockers cause the stomach to make less acid. They are often used both on demand as symptoms occur, and daily to keep symptoms away. Your provider may prescribe them if antacids don t work for you. You can buy some of them over the counter. These come in a lower dosage.  Side effects: Confusion in older adults  Proton-pump inhibitors  These also cause the stomach to make less acid. They reduce stomach acid more than H-2 blockers. They may be used for a short time, or longer to treat certain conditions. You can buy some of them over the counter. Or your provider may prescribe them. They help control GERD symptoms.  Side effects: Belly (abdominal) pain, diarrhea, upset stomach (nausea). Possible other side effects linked to long-term use and high doses.  Prokinetics  These medicines affect the movement of the digestive tract. They may be recommended if your stomach is emptying too slowly. But in most cases they are not recommended for treating GERD.    Side effects: Tiredness, depression, anxiety, problems with physical movement, belly cramps, constipation, diarrhea, a jittery feeling  Medicines to avoid  Don t take aspirin without your provider s approval. And don t take a nonsteroidal anti-inflammatory drug (NSAID), such as ibuprofen. These reduce the protective lining of your stomach. This can lead to more GERD symptoms. Check with your provider or pharmacist before taking a new medicine.   Date Last Reviewed: 7/1/2016 2000-2018 [employer]. [address] [place] [address]. All rights reserved. This information is not intended as a substitute for professional medical care. Always follow your healthcare professional's instructions.

## 2019-09-04 ASSESSMENT — ANXIETY QUESTIONNAIRES: GAD7 TOTAL SCORE: 5

## 2019-09-09 ENCOUNTER — TRANSFERRED RECORDS (OUTPATIENT)
Dept: HEALTH INFORMATION MANAGEMENT | Facility: CLINIC | Age: 34
End: 2019-09-09

## 2019-09-10 ENCOUNTER — TELEPHONE (OUTPATIENT)
Dept: FAMILY MEDICINE | Facility: CLINIC | Age: 34
End: 2019-09-10

## 2019-09-10 NOTE — TELEPHONE ENCOUNTER
Reason for Call:  Procedure  has reached out to schedule diagnostic upper gi endoscopy X 3    Detailed comments: patient has failed to return calls to schedule    No further action necessary for procedure  at this time    Phone Number Patient can be reached at: Home number on file [phone] (home)    Best Time: NA    Can we leave a detailed message on this number? Not Applicable    Call taken on 9/10/2019 at 1:48 PM by Joan Grady

## 2019-09-23 ENCOUNTER — TRANSFERRED RECORDS (OUTPATIENT)
Dept: HEALTH INFORMATION MANAGEMENT | Facility: CLINIC | Age: 34
End: 2019-09-23

## 2019-10-03 ENCOUNTER — HEALTH MAINTENANCE LETTER (OUTPATIENT)
Age: 34
End: 2019-10-03

## 2019-11-06 ENCOUNTER — ANCILLARY PROCEDURE (OUTPATIENT)
Dept: GENERAL RADIOLOGY | Facility: CLINIC | Age: 34
End: 2019-11-06
Attending: NURSE PRACTITIONER
Payer: COMMERCIAL

## 2019-11-06 ENCOUNTER — OFFICE VISIT (OUTPATIENT)
Dept: URGENT CARE | Facility: URGENT CARE | Age: 34
End: 2019-11-06
Payer: COMMERCIAL

## 2019-11-06 VITALS
HEART RATE: 103 BPM | WEIGHT: 168.2 LBS | DIASTOLIC BLOOD PRESSURE: 66 MMHG | OXYGEN SATURATION: 98 % | SYSTOLIC BLOOD PRESSURE: 118 MMHG | BODY MASS INDEX: 29.8 KG/M2 | RESPIRATION RATE: 16 BRPM | TEMPERATURE: 98.2 F

## 2019-11-06 DIAGNOSIS — R05.9 COUGH: ICD-10-CM

## 2019-11-06 DIAGNOSIS — J40 BRONCHITIS: Primary | ICD-10-CM

## 2019-11-06 PROCEDURE — 71046 X-RAY EXAM CHEST 2 VIEWS: CPT

## 2019-11-06 PROCEDURE — 99214 OFFICE O/P EST MOD 30 MIN: CPT | Performed by: NURSE PRACTITIONER

## 2019-11-06 RX ORDER — BENZONATATE 200 MG/1
200 CAPSULE ORAL 3 TIMES DAILY PRN
Qty: 30 CAPSULE | Refills: 0 | Status: SHIPPED | OUTPATIENT
Start: 2019-11-06 | End: 2020-04-20

## 2019-11-06 RX ORDER — PREDNISONE 20 MG/1
40 TABLET ORAL DAILY
Qty: 10 TABLET | Refills: 0 | Status: SHIPPED | OUTPATIENT
Start: 2019-11-06 | End: 2020-04-20

## 2019-11-06 NOTE — NURSING NOTE
"Chief Complaint   Patient presents with     Cough     Started Sunday night. Has cough, sore throat and a bad ear pain.  Daughter has croup and ear infection, grandparents have pneumonia        Initial /66 (BP Location: Right arm, Patient Position: Chair, Cuff Size: Adult Regular)   Pulse 103   Temp 98.2  F (36.8  C) (Tympanic)   Resp 16   Wt 76.3 kg (168 lb 3.2 oz)   SpO2 98%   BMI 29.80 kg/m   Estimated body mass index is 29.8 kg/m  as calculated from the following:    Height as of 9/3/19: 1.6 m (5' 3\").    Weight as of this encounter: 76.3 kg (168 lb 3.2 oz).    Patient presents to the clinic using No DME    Health Maintenance that is potentially due pending provider review:  NONE    n/a    Is there anyone who you would like to be able to receive your results? No  If yes have patient fill out [facility]  Suzanne Mera M.A.        "

## 2019-11-07 NOTE — PATIENT INSTRUCTIONS
Increase rest and fluids. Tylenol and/or Ibuprofen for comfort. Cool mist vaporizer. If your symptoms worsen or do not resolve follow up with your primary care provider in 1 week and sooner if needed.      Mucinex 600 mg 12 hour formula for ear, head and chest congestion.  It can also thin post nasal drip which can cause a cough and sore throat.    Indications for emergent return to emergency department discussed with patient, who verbalized good understanding and agreement.  Patient understands the limitations of today's evaluation.           Patient Education     Viral Bronchitis (Adult)    You have a viral bronchitis. Bronchitis is inflammation and swelling of the lining of the lungs. This is often caused by an infection. Symptoms include a dry, hacking cough that is worse at night. The cough may bring up yellow-green mucus. You may also feel short of breath or wheeze. Other symptoms may include tiredness, chest discomfort, and chills.  Bronchitis that is caused by a virus is not treated with antibiotics. Instead, medicines may be given to help relieve symptoms. Symptoms can last up to 2 weeks, although the cough may last much longer.  This illness is contagious during the first few days and is spread through the air by coughing and sneezing, or by direct contact (touching the sick person and then touching your own eyes, nose, or mouth).  Most viral illnesses resolve within 10 to 14 days with rest and simple home remedies, although they may sometimes last for several weeks.  Home care    If symptoms are severe, rest at home for the first 2 to 3 days. When you go back to your usual activities, don't let yourself get too tired.    Do not smoke. Also avoid being exposed to secondhand smoke.    You may use over-the-counter medicine to control fever or pain, unless another pain medicine was prescribed. If you have chronic liver or kidney disease or have ever had a stomach ulcer or gastrointestinal bleeding, talk with  your healthcare provider before using these medicines. Also talk to your provider if you are taking medicine to prevent blood clots. Aspirin should never be given to anyone younger than 18 years of age who is ill with a viral infection or fever. It may cause severe liver or brain damage.    Your appetite may be poor, so a light diet is fine. Avoid dehydration by drinking 6 to 8 glasses of fluids per day (such as water, soft drinks, sports drinks, juices, tea, or soup). Extra fluids will help loosen secretions in the nose and lungs.    Over-the-counter cough, cold, and sore-throat medicines will not shorten the length of the illness, but they may help to reduce symptoms. Don't use decongestants if you have high blood pressure.  Follow-up care  Follow up with your healthcare provider, or as advised. If you had an X-ray or ECG (electrocardiogram), a specialist will review it. You will be notified of any new findings that may affect your care.  If you are age 65 or older, or if you have a chronic lung disease or condition that affects your immune system, or you smoke, ask your healthcare provider about getting a pneumococcal vaccine and a yearly flu shot (influenza vaccine).  When to seek medical advice  Call your healthcare provider right away if any of these occur:    Fever of 100.4 F (38 C) or higher, or as directed by your healthcare provider    Coughing up increased amounts of colored sputum    Weakness, drowsiness, headache, facial pain, ear pain, or a stiff neck  Call 911  Call 911 if any of these occur:    Coughing up blood    Worsening weakness, drowsiness, headache, or stiff neck    Trouble breathing, wheezing, or pain with breathing  Date Last Reviewed: 6/1/2018 2000-2018 [employer]. [address] [place] [id number]. All rights reserved. This information is not intended as a substitute for professional medical care. Always follow your healthcare professional's instructions.        "    Patient Education     Viral Syndrome (Adult)  A viral illness may cause a number of symptoms such as fever. Other symptoms depend on the part of the body that the virus affects. If it settles in your nose, throat, and lungs, it may cause cough, sore throat, congestion, runny nose, headache, earache and other ear symptoms, or shortness of breath. If it settles in your stomach and intestinal tract, it may cause nausea, vomiting, cramping, and diarrhea. Sometimes it causes generalized symptoms like \"aching all over,\" feeling tired, loss of energy, or loss of appetite.  A viral illness usually lasts anywhere from several days to several weeks, but sometimes it lasts longer. In some cases, a more serious infection can look like a viral syndrome in the first few days of the illness. You may need another exam and additional tests to know the difference. Watch for the warning signs listed below for when to seek medical advice.  Home care  Follow these guidelines for taking care of yourself at home:    If symptoms are severe, rest at home for the first 2 to 3 days.    Stay away from cigarette smoke - both your smoke and the smoke from others.    You may use over-the-counter acetaminophen or ibuprofen for fever, muscle aching, and headache, unless another medicine was prescribed for this. If you have chronic liver or kidney disease or ever had a stomach ulcer or gastrointestinal bleeding, talk with your healthcare provider before using these medicines. No one who is younger than 18 and ill with a fever should take aspirin. It may cause severe disease or death.    Your appetite may be poor, so a light diet is fine. Avoid dehydration by drinking 8 to 12, 8-ounce glasses of fluids each day. This may include water; orange juice; lemonade; apple, grape, and cranberry juice; clear fruit drinks; electrolyte replacement and sports drinks; and decaffeinated teas and coffee. If you have been diagnosed with a kidney disease, ask " your healthcare provider how much and what types of fluids you should drink to prevent dehydration. If you have kidney disease, drinking too much fluid can cause it build up in the your body and be dangerous to your health.    Over-the-counter remedies won't shorten the length of the illness but may be helpful for symptoms such as cough, sore throat, nasal and sinus congestion, or diarrhea. Don't use decongestants if you have high blood pressure.  Follow-up care  Follow up with your healthcare provider if you do not improve over the next week.  Call 911  Call 911 if any of the following occur:    Convulsion    Feeling weak, dizzy, or like you are going to faint    Chest pain, or more than mild shortness of breath  When to seek medical advice  Call your healthcare provider right away if any of these occur:    Cough with lots of colored sputum (mucus) or blood in your sputum    Chest pain, shortness of breath, wheezing, or trouble breathing    Severe headache; face, neck, or ear pain    Severe, constant pain in the lower right side of your belly (abdominal)    Continued vomiting (can t keep liquids down)    Frequent diarrhea (more than 5 times a day); blood (red or black color) or mucus in diarrhea    Feeling weak, dizzy, or like you are going to faint    Extreme thirst    Fever of 100.4 F (38 C) or higher, or as directed by your healthcare provider  Date Last Reviewed: 4/1/2018 2000-2018 [employer]. [address] [place] [id number]. All rights reserved. This information is not intended as a substitute for professional medical care. Always follow your healthcare professional's instructions.

## 2019-11-07 NOTE — PROGRESS NOTES
Subjective     Obdulia Black is a 34 year old female who presents to clinic today for the following health issues:    HPI     Chief Complaint   Patient presents with     Cough     Started  night. Has cough, sore throat and a bad ear pain.  Daughter has croup and ear infection, grandparents have pneumonia          Patient Active Problem List   Diagnosis     Esophageal reflux     Dysplasia of cervix, low grade (JAEL 1)     CARDIOVASCULAR SCREENING; LDL GOAL LESS THAN 160     Moderate major depression (H)     Tobacco use disorder     Allergic rhinitis     Prenatal care, first pregnancy     Numbness and tingling of both upper extremities while sleeping     Normal pregnancy     Vaginal delivery     Past Surgical History:   Procedure Laterality Date     MOUTH SURGERY      wisdom teeth x 4     PE TUBES  3/02/92     SURGICAL HISTORY OF -   03    Excision of cyst on left middle finger     TONSILLECTOMY & ADENOIDECTOMY  1991       Social History     Tobacco Use     Smoking status: Former Smoker     Last attempt to quit: 2008     Years since quittin.4     Smokeless tobacco: Never Used     Tobacco comment: 0   Substance Use Topics     Alcohol use: No     Alcohol/week: 0.0 standard drinks     Comment: 1-2 weekly- quit with pregnancy     Family History   Problem Relation Age of Onset     Hypertension Maternal Grandmother      Other Cancer Maternal Grandmother         leukemia     Gastrointestinal Disease Maternal Grandmother         diverticulitis     Diabetes Maternal Grandfather      Cerebrovascular Disease Maternal Grandfather      Heart Disease Maternal Grandfather         MI- open heart surgery     Eye Disorder Maternal Grandfather      Cancer Paternal Grandmother         leukemia     Prostate Cancer Paternal Grandfather      Alzheimer Disease Paternal Grandfather      Substance Abuse Paternal Grandfather         alcohol     Anemia Mother      Depression Mother      Rheumatoid Arthritis Father       Diabetes Maternal Uncle      Substance Abuse Brother         prescription pills         Current Outpatient Medications   Medication Sig Dispense Refill     benzonatate (TESSALON) 200 MG capsule Take 1 capsule (200 mg) by mouth 3 times daily as needed for cough 30 capsule 0     cetirizine (ZYRTEC) 10 MG tablet Take 1 tablet (10 mg) by mouth every evening 30 tablet 1     desogestrel-ethinyl estradiol (APRI) 0.15-30 MG-MCG tablet Take 1 tablet by mouth daily 84 tablet 3     fluticasone (FLONASE) 50 MCG/ACT nasal spray Spray 2 sprays in nostril daily 1 Package 11     predniSONE (DELTASONE) 20 MG tablet Take 2 tablets (40 mg) by mouth daily for 5 days 10 tablet 0     ranitidine (ZANTAC) 150 MG tablet Take 1 tablet (150 mg) by mouth 2 times daily 180 tablet 0     sertraline (ZOLOFT) 100 MG tablet Take 1 tablet (100 mg) by mouth daily 90 tablet 3     ibuprofen (ADVIL,MOTRIN) 200 MG tablet Take 3 tablets (600 mg) by mouth every 6 hours as needed for mild pain       omeprazole (PRILOSEC) 20 MG DR capsule Take 1 capsule (20 mg) by mouth daily (Patient not taking: Reported on 11/6/2019) 90 capsule 0     omeprazole 20 MG tablet Take 1 tablet (20 mg) by mouth daily 90 tablet 3     Prenatal Vit-Fe Fumarate-FA (PRENATAL MULTIVITAMIN  PLUS IRON) 27-0.8 MG TABS Take 1 tablet by mouth daily       Allergies   Allergen Reactions     Sulfa Drugs Itching and Swelling         Reviewed and updated as needed this visit by Provider  Tobacco  Allergies  Meds  Problems  Med Hx  Surg Hx  Fam Hx         Review of Systems   ROS COMP: Constitutional, HEENT, cardiovascular, pulmonary, GI, , musculoskeletal, neuro, skin, endocrine and psych systems are negative, except as otherwise noted.      Objective    /66 (BP Location: Right arm, Patient Position: Chair, Cuff Size: Adult Regular)   Pulse 103   Temp 98.2  F (36.8  C) (Tympanic)   Resp 16   Wt 76.3 kg (168 lb 3.2 oz)   SpO2 98%   BMI 29.80 kg/m    Body mass index is 29.8  "kg/m .  Physical Exam   GENERAL: healthy, alert and no distress, nontoxic in appearance  EYES: Eyes grossly normal to inspection, PERRL and conjunctivae and sclerae normal  HENT: ear canals and TM's normal, nose and mouth without ulcers or lesions  NECK: no adenopathy, supple with full ROM  RESP: lungs clear to auscultation - no rales, rhonchi but occasional wheezes right lower fields  CV: regular rate and rhythm, normal S1 S2, no S3 or S4, no murmur, click or rub, no peripheral edema   ABDOMEN: soft, nontender, no hepatosplenomegaly, no masses and bowel sounds normal  MS: no gross musculoskeletal defects noted, no edema  No rash    Diagnostic Test Results: right side hazy. Will await over read and follow up as indicated.      CHEST TWO VIEWS 11/6/2019 6:27 PM      HISTORY: Cough.     COMPARISON: None.                                                                       IMPRESSION: There are no acute infiltrates. The cardiac silhouette is  not enlarged. Pulmonary vasculature is unremarkable.    Labs reviewed in Epic  No results found for this or any previous visit (from the past 24 hour(s)).        Assessment & Plan   Problem List Items Addressed This Visit     None      Visit Diagnoses     Bronchitis    -  Primary    Relevant Medications    benzonatate (TESSALON) 200 MG capsule    predniSONE (DELTASONE) 20 MG tablet    Cough        Relevant Medications    benzonatate (TESSALON) 200 MG capsule    predniSONE (DELTASONE) 20 MG tablet    Other Relevant Orders    XR Chest 2 Views (Completed)             BMI:   Estimated body mass index is 29.8 kg/m  as calculated from the following:    Height as of 9/3/19: 1.6 m (5' 3\").    Weight as of this encounter: 76.3 kg (168 lb 3.2 oz).   Weight management plan: Patient was referred to their PCP to discuss a diet and exercise plan.        Patient Instructions   Increase rest and fluids. Tylenol and/or Ibuprofen for comfort. Cool mist vaporizer. If your symptoms worsen or do not " resolve follow up with your primary care provider in 1 week and sooner if needed.      Mucinex 600 mg 12 hour formula for ear, head and chest congestion.  It can also thin post nasal drip which can cause a cough and sore throat.    Indications for emergent return to emergency department discussed with patient, who verbalized good understanding and agreement.  Patient understands the limitations of today's evaluation.           Patient Education     Viral Bronchitis (Adult)    You have a viral bronchitis. Bronchitis is inflammation and swelling of the lining of the lungs. This is often caused by an infection. Symptoms include a dry, hacking cough that is worse at night. The cough may bring up yellow-green mucus. You may also feel short of breath or wheeze. Other symptoms may include tiredness, chest discomfort, and chills.  Bronchitis that is caused by a virus is not treated with antibiotics. Instead, medicines may be given to help relieve symptoms. Symptoms can last up to 2 weeks, although the cough may last much longer.  This illness is contagious during the first few days and is spread through the air by coughing and sneezing, or by direct contact (touching the sick person and then touching your own eyes, nose, or mouth).  Most viral illnesses resolve within 10 to 14 days with rest and simple home remedies, although they may sometimes last for several weeks.  Home care    If symptoms are severe, rest at home for the first 2 to 3 days. When you go back to your usual activities, don't let yourself get too tired.    Do not smoke. Also avoid being exposed to secondhand smoke.    You may use over-the-counter medicine to control fever or pain, unless another pain medicine was prescribed. If you have chronic liver or kidney disease or have ever had a stomach ulcer or gastrointestinal bleeding, talk with your healthcare provider before using these medicines. Also talk to your provider if you are taking medicine to prevent  blood clots. Aspirin should never be given to anyone younger than 18 years of age who is ill with a viral infection or fever. It may cause severe liver or brain damage.    Your appetite may be poor, so a light diet is fine. Avoid dehydration by drinking 6 to 8 glasses of fluids per day (such as water, soft drinks, sports drinks, juices, tea, or soup). Extra fluids will help loosen secretions in the nose and lungs.    Over-the-counter cough, cold, and sore-throat medicines will not shorten the length of the illness, but they may help to reduce symptoms. Don't use decongestants if you have high blood pressure.  Follow-up care  Follow up with your healthcare provider, or as advised. If you had an X-ray or ECG (electrocardiogram), a specialist will review it. You will be notified of any new findings that may affect your care.  If you are age 65 or older, or if you have a chronic lung disease or condition that affects your immune system, or you smoke, ask your healthcare provider about getting a pneumococcal vaccine and a yearly flu shot (influenza vaccine).  When to seek medical advice  Call your healthcare provider right away if any of these occur:    Fever of 100.4 F (38 C) or higher, or as directed by your healthcare provider    Coughing up increased amounts of colored sputum    Weakness, drowsiness, headache, facial pain, ear pain, or a stiff neck  Call 911  Call 911 if any of these occur:    Coughing up blood    Worsening weakness, drowsiness, headache, or stiff neck    Trouble breathing, wheezing, or pain with breathing  Date Last Reviewed: 6/1/2018 2000-2018 [employer]. [address] [place] [id number]. All rights reserved. This information is not intended as a substitute for professional medical care. Always follow your healthcare professional's instructions.           Patient Education     Viral Syndrome (Adult)  A viral illness may cause a number of symptoms such as fever. Other  "symptoms depend on the part of the body that the virus affects. If it settles in your nose, throat, and lungs, it may cause cough, sore throat, congestion, runny nose, headache, earache and other ear symptoms, or shortness of breath. If it settles in your stomach and intestinal tract, it may cause nausea, vomiting, cramping, and diarrhea. Sometimes it causes generalized symptoms like \"aching all over,\" feeling tired, loss of energy, or loss of appetite.  A viral illness usually lasts anywhere from several days to several weeks, but sometimes it lasts longer. In some cases, a more serious infection can look like a viral syndrome in the first few days of the illness. You may need another exam and additional tests to know the difference. Watch for the warning signs listed below for when to seek medical advice.  Home care  Follow these guidelines for taking care of yourself at home:    If symptoms are severe, rest at home for the first 2 to 3 days.    Stay away from cigarette smoke - both your smoke and the smoke from others.    You may use over-the-counter acetaminophen or ibuprofen for fever, muscle aching, and headache, unless another medicine was prescribed for this. If you have chronic liver or kidney disease or ever had a stomach ulcer or gastrointestinal bleeding, talk with your healthcare provider before using these medicines. No one who is younger than 18 and ill with a fever should take aspirin. It may cause severe disease or death.    Your appetite may be poor, so a light diet is fine. Avoid dehydration by drinking 8 to 12, 8-ounce glasses of fluids each day. This may include water; orange juice; lemonade; apple, grape, and cranberry juice; clear fruit drinks; electrolyte replacement and sports drinks; and decaffeinated teas and coffee. If you have been diagnosed with a kidney disease, ask your healthcare provider how much and what types of fluids you should drink to prevent dehydration. If you have kidney " disease, drinking too much fluid can cause it build up in the your body and be dangerous to your health.    Over-the-counter remedies won't shorten the length of the illness but may be helpful for symptoms such as cough, sore throat, nasal and sinus congestion, or diarrhea. Don't use decongestants if you have high blood pressure.  Follow-up care  Follow up with your healthcare provider if you do not improve over the next week.  Call 911  Call 911 if any of the following occur:    Convulsion    Feeling weak, dizzy, or like you are going to faint    Chest pain, or more than mild shortness of breath  When to seek medical advice  Call your healthcare provider right away if any of these occur:    Cough with lots of colored sputum (mucus) or blood in your sputum    Chest pain, shortness of breath, wheezing, or trouble breathing    Severe headache; face, neck, or ear pain    Severe, constant pain in the lower right side of your belly (abdominal)    Continued vomiting (can t keep liquids down)    Frequent diarrhea (more than 5 times a day); blood (red or black color) or mucus in diarrhea    Feeling weak, dizzy, or like you are going to faint    Extreme thirst    Fever of 100.4 F (38 C) or higher, or as directed by your healthcare provider  Date Last Reviewed: 4/1/2018 2000-2018 [employer]. [address] [place] [address], [place] [address]. All rights reserved. This information is not intended as a substitute for professional medical care. Always follow your healthcare professional's instructions.             Return in about 1 week (around 11/13/2019) for Follow up with your primary care provider.    NICOLA Stacy [facility]

## 2019-11-13 ENCOUNTER — OFFICE VISIT (OUTPATIENT)
Dept: FAMILY MEDICINE | Facility: CLINIC | Age: 34
End: 2019-11-13
Payer: COMMERCIAL

## 2019-11-13 VITALS
DIASTOLIC BLOOD PRESSURE: 62 MMHG | OXYGEN SATURATION: 96 % | TEMPERATURE: 97.7 F | HEART RATE: 91 BPM | WEIGHT: 171 LBS | SYSTOLIC BLOOD PRESSURE: 112 MMHG | RESPIRATION RATE: 18 BRPM | BODY MASS INDEX: 30.29 KG/M2

## 2019-11-13 DIAGNOSIS — J01.01 ACUTE RECURRENT MAXILLARY SINUSITIS: ICD-10-CM

## 2019-11-13 DIAGNOSIS — H65.93 BILATERAL NON-SUPPURATIVE OTITIS MEDIA: Primary | ICD-10-CM

## 2019-11-13 PROCEDURE — 99213 OFFICE O/P EST LOW 20 MIN: CPT | Performed by: NURSE PRACTITIONER

## 2019-11-13 NOTE — NURSING NOTE
"Chief Complaint   Patient presents with     URI       Initial /62 (BP Location: Right arm, Patient Position: Chair, Cuff Size: Adult Regular)   Pulse 91   Temp 97.7  F (36.5  C) (Tympanic)   Resp 18   Wt 77.6 kg (171 lb)   SpO2 96%   BMI 30.29 kg/m   Estimated body mass index is 30.29 kg/m  as calculated from the following:    Height as of 9/3/19: 1.6 m (5' 3\").    Weight as of this encounter: 77.6 kg (171 lb).    Patient presents to the clinic using No DME    Health Maintenance that is potentially due pending provider review:  NONE    n/a    Is there anyone who you would like to be able to receive your results? No  If yes have patient fill out [employer]  Suzanne Mera M.A.        "

## 2019-11-13 NOTE — PATIENT INSTRUCTIONS
Increase rest and fluids. Tylenol and/or Ibuprofen for comfort. Cool mist vaporizer. If your symptoms worsen or do not resolve follow up with your primary care provider in 1 week and sooner if needed.        Indications for emergent return to emergency department discussed with patient, who verbalized good understanding and agreement.  Patient understands the limitations of today's evaluation.           Patient Education     Middle Ear Infection (Adult)  You have an infection of the middle ear, the space behind the eardrum. This is also called acute otitis media (AOM). Sometimes it is caused by the common cold. This is because congestion can block the internal passage (eustachian tube) that drains fluid from the middle ear. When the middle ear fills with fluid, bacteria can grow there and cause an infection. Oral antibiotics are used to treat this illness, not ear drops. Symptoms usually start to improve within 1 to 2 days of treatment.    Home care  The following are general care guidelines:    Finish all of the antibiotic medicine given, even though you may feel better after the first few days.    You may use over-the-counter medicine, such as acetaminophen or ibuprofen, to control pain and fever, unless something else was prescribed. If you have chronic liver or kidney disease or have ever had a stomach ulcer or gastrointestinal bleeding, talk with your healthcare provider before using these medicines. Do not give aspirin to anyone under 18 years of age who has a fever. It may cause severe illness or death.  Follow-up care  Follow up with your healthcare provider, or as advised, in 2 weeks if all symptoms have not gotten better, or if hearing doesn't go back to normal within 1 month.  When to seek medical advice  Call your healthcare provider right away if any of these occur:    Ear pain gets worse or does not improve after 3 days of treatment    Unusual drowsiness or confusion    Neck pain, stiff neck, or  headache    Fluid or blood draining from the ear canal    Fever of 100.4 F (38 C) or as advised     Seizure  Date Last Reviewed: 6/1/2016 2000-2018 [employer]. [address] [place] [address], [place] [address]. All rights reserved. This information is not intended as a substitute for professional medical care. Always follow your healthcare professional's instructions.

## 2019-11-13 NOTE — PROGRESS NOTES
Subjective     Obdulia Black is a 34 year old female who presents to clinic today for the following health issues:    HPI   ENT Symptoms             Symptoms: cc Present Absent Comment   Fever/Chills       Fatigue       Muscle Aches       Eye Irritation       Sneezing       Nasal Xiang/Drg  x  Green drainage    Sinus Pressure/Pain  x     Loss of smell       Dental pain       Sore Throat       Swollen Glands       Ear Pain/Fullness  x     Cough  x  Green production    Wheeze       Chest Pain       Shortness of breath  x  Winded easily now    Rash       Other  x  Headaches     Symptom duration:  Over a week    Symptom severity:  worse    Treatments tried:  Sudafed and Mucinex    Contacts:  had bronchitis last week            Patient Active Problem List   Diagnosis     Esophageal reflux     Dysplasia of cervix, low grade (JAEL 1)     CARDIOVASCULAR SCREENING; LDL GOAL LESS THAN 160     Moderate major depression (H)     Tobacco use disorder     Allergic rhinitis     Prenatal care, first pregnancy     Numbness and tingling of both upper extremities while sleeping     Normal pregnancy     Vaginal delivery     Past Surgical History:   Procedure Laterality Date     MOUTH SURGERY      wisdom teeth x 4     PE TUBES  3/02/92     SURGICAL HISTORY OF -   03    Excision of cyst on left middle finger     TONSILLECTOMY & ADENOIDECTOMY  1991       Social History     Tobacco Use     Smoking status: Former Smoker     Last attempt to quit: 2008     Years since quittin.4     Smokeless tobacco: Never Used     Tobacco comment: 0   Substance Use Topics     Alcohol use: No     Alcohol/week: 0.0 standard drinks     Comment: 1-2 weekly- quit with pregnancy     Family History   Problem Relation Age of Onset     Hypertension Maternal Grandmother      Other Cancer Maternal Grandmother         leukemia     Gastrointestinal Disease Maternal Grandmother         diverticulitis     Diabetes Maternal Grandfather       Cerebrovascular Disease Maternal Grandfather      Heart Disease Maternal Grandfather         MI- open heart surgery     Eye Disorder Maternal Grandfather      Cancer Paternal Grandmother         leukemia     Prostate Cancer Paternal Grandfather      Alzheimer Disease Paternal Grandfather      Substance Abuse Paternal Grandfather         alcohol     Anemia Mother      Depression Mother      Rheumatoid Arthritis Father      Diabetes Maternal Uncle      Substance Abuse Brother         prescription pills         Current Outpatient Medications   Medication Sig Dispense Refill     amoxicillin-clavulanate (AUGMENTIN) 875-125 MG tablet Take 1 tablet by mouth 2 times daily for 10 days 20 tablet 0     benzonatate (TESSALON) 200 MG capsule Take 1 capsule (200 mg) by mouth 3 times daily as needed for cough 30 capsule 0     cetirizine (ZYRTEC) 10 MG tablet Take 1 tablet (10 mg) by mouth every evening 30 tablet 1     desogestrel-ethinyl estradiol (APRI) 0.15-30 MG-MCG tablet Take 1 tablet by mouth daily 84 tablet 3     fluticasone (FLONASE) 50 MCG/ACT nasal spray Spray 2 sprays in nostril daily 1 Package 11     ranitidine (ZANTAC) 150 MG tablet Take 1 tablet (150 mg) by mouth 2 times daily 180 tablet 0     sertraline (ZOLOFT) 100 MG tablet Take 1 tablet (100 mg) by mouth daily 90 tablet 3     ibuprofen (ADVIL,MOTRIN) 200 MG tablet Take 3 tablets (600 mg) by mouth every 6 hours as needed for mild pain       omeprazole (PRILOSEC) 20 MG DR capsule Take 1 capsule (20 mg) by mouth daily (Patient not taking: Reported on 11/13/2019) 90 capsule 0     omeprazole 20 MG tablet Take 1 tablet (20 mg) by mouth daily 90 tablet 3     Prenatal Vit-Fe Fumarate-FA (PRENATAL MULTIVITAMIN  PLUS IRON) 27-0.8 MG TABS Take 1 tablet by mouth daily       Allergies   Allergen Reactions     Sulfa Drugs Itching and Swelling         Reviewed and updated as needed this visit by Provider  Tobacco  Allergies  Meds  Problems  Med Hx  Surg Hx  Fam Hx      "    Review of Systems   ROS COMP: Constitutional, HEENT, cardiovascular, pulmonary, GI, , musculoskeletal, neuro, skin, endocrine and psych systems are negative, except as otherwise noted.      Objective    /62 (BP Location: Right arm, Patient Position: Chair, Cuff Size: Adult Regular)   Pulse 91   Temp 97.7  F (36.5  C) (Tympanic)   Resp 18   Wt 77.6 kg (171 lb)   SpO2 96%   BMI 30.29 kg/m    Body mass index is 30.29 kg/m .  Physical Exam   GENERAL: healthy, alert and no distress, nontoxic in appearance  EYES: Eyes grossly normal to inspection, PERRL and conjunctivae and sclerae normal  HENT: ear canals and TM's intact bilaterally and both moderately red, nose and mouth without ulcers or lesions  NECK: no adenopathy, supple with full ROM  RESP: lungs clear to auscultation - no rales, rhonchi or wheezes but has small occasional squeak with inspiration.  CV: regular rate and rhythm, normal S1 S2, no S3 or S4, no murmur, click or rub, no peripheral edema   ABDOMEN: soft, nontender, no hepatosplenomegaly, no masses and bowel sounds normal  MS: no gross musculoskeletal defects noted, no edema  No rash    Diagnostic Test Results:  Labs reviewed in Epic  No results found for this or any previous visit (from the past 24 hour(s)).        Assessment & Plan  If patient calls for diflucan for yeast vag due to antibiotics it is ok to Rx this for her.   Problem List Items Addressed This Visit     None      Visit Diagnoses     Bilateral non-suppurative otitis media    -  Primary    Relevant Medications    amoxicillin-clavulanate (AUGMENTIN) 875-125 MG tablet    Acute recurrent maxillary sinusitis        Relevant Medications    amoxicillin-clavulanate (AUGMENTIN) 875-125 MG tablet             BMI:   Estimated body mass index is 30.29 kg/m  as calculated from the following:    Height as of 9/3/19: 1.6 m (5' 3\").    Weight as of this encounter: 77.6 kg (171 lb).   Weight management plan: Patient was referred to their " PCP to discuss a diet and exercise plan.        Patient Instructions   Increase rest and fluids. Tylenol and/or Ibuprofen for comfort. Cool mist vaporizer. If your symptoms worsen or do not resolve follow up with your primary care provider in 1 week and sooner if needed.        Indications for emergent return to emergency department discussed with patient, who verbalized good understanding and agreement.  Patient understands the limitations of today's evaluation.           Patient Education     Middle Ear Infection (Adult)  You have an infection of the middle ear, the space behind the eardrum. This is also called acute otitis media (AOM). Sometimes it is caused by the common cold. This is because congestion can block the internal passage (eustachian tube) that drains fluid from the middle ear. When the middle ear fills with fluid, bacteria can grow there and cause an infection. Oral antibiotics are used to treat this illness, not ear drops. Symptoms usually start to improve within 1 to 2 days of treatment.    Home care  The following are general care guidelines:    Finish all of the antibiotic medicine given, even though you may feel better after the first few days.    You may use over-the-counter medicine, such as acetaminophen or ibuprofen, to control pain and fever, unless something else was prescribed. If you have chronic liver or kidney disease or have ever had a stomach ulcer or gastrointestinal bleeding, talk with your healthcare provider before using these medicines. Do not give aspirin to anyone under 18 years of age who has a fever. It may cause severe illness or death.  Follow-up care  Follow up with your healthcare provider, or as advised, in 2 weeks if all symptoms have not gotten better, or if hearing doesn't go back to normal within 1 month.  When to seek medical advice  Call your healthcare provider right away if any of these occur:    Ear pain gets worse or does not improve after 3 days of  treatment    Unusual drowsiness or confusion    Neck pain, stiff neck, or headache    Fluid or blood draining from the ear canal    Fever of 100.4 F (38 C) or as advised     Seizure  Date Last Reviewed: 6/1/2016 2000-2018 [employer]. [address] [place] [address]. All rights reserved. This information is not intended as a substitute for professional medical care. Always follow your healthcare professional's instructions.             Return in about 2 weeks (around 11/27/2019), or if symptoms worsen or fail to improve, for Follow up with your primary care provider.    NICOLA Stacy [facility]

## 2020-03-22 ENCOUNTER — HEALTH MAINTENANCE LETTER (OUTPATIENT)
Age: 35
End: 2020-03-22

## 2020-04-20 ENCOUNTER — OFFICE VISIT (OUTPATIENT)
Dept: FAMILY MEDICINE | Facility: CLINIC | Age: 35
End: 2020-04-20
Payer: COMMERCIAL

## 2020-04-20 VITALS
WEIGHT: 176.6 LBS | SYSTOLIC BLOOD PRESSURE: 122 MMHG | TEMPERATURE: 97.2 F | OXYGEN SATURATION: 99 % | HEART RATE: 102 BPM | RESPIRATION RATE: 18 BRPM | DIASTOLIC BLOOD PRESSURE: 78 MMHG | BODY MASS INDEX: 31.28 KG/M2

## 2020-04-20 DIAGNOSIS — J30.81 ALLERGIC RHINITIS DUE TO ANIMAL HAIR AND DANDER: ICD-10-CM

## 2020-04-20 DIAGNOSIS — H69.92 DYSFUNCTION OF LEFT EUSTACHIAN TUBE: Primary | ICD-10-CM

## 2020-04-20 PROCEDURE — 99213 OFFICE O/P EST LOW 20 MIN: CPT | Performed by: NURSE PRACTITIONER

## 2020-04-20 RX ORDER — CIPROFLOXACIN AND DEXAMETHASONE 3; 1 MG/ML; MG/ML
4 SUSPENSION/ DROPS AURICULAR (OTIC) 2 TIMES DAILY
Qty: 2.8 ML | Refills: 0 | Status: SHIPPED | OUTPATIENT
Start: 2020-04-20 | End: 2020-04-20

## 2020-04-20 RX ORDER — NEOMYCIN SULFATE, POLYMYXIN B SULFATE AND DEXAMETHASONE 3.5; 10000; 1 MG/ML; [USP'U]/ML; MG/ML
SUSPENSION/ DROPS OPHTHALMIC
Qty: 1 BOTTLE | Refills: 0 | Status: SHIPPED | OUTPATIENT
Start: 2020-04-20 | End: 2020-05-07

## 2020-04-20 NOTE — PATIENT INSTRUCTIONS
1. Dysfunction of left eustachian tube  Acute, stable  - ciprofloxacin-dexamethasone (CIPRODEX) 0.3-0.1 % otic suspension; Place 4 drops Into the left ear 2 times daily for 7 days  Dispense: 2.8 mL; Refill: 0    2. Allergic rhinitis due to animal hair and dander  Chronic, stable  - ciprofloxacin-dexamethasone (CIPRODEX) 0.3-0.1 % otic suspension; Place 4 drops Into the left ear 2 times daily for 7 days  Dispense: 2.8 mL; Refill: 0  Consider switching to Claritin, Allegra, or Xyzol  ADDENDUM:   D/C Ciprodex due to cost  - neomycin-polymyxin-dexamethasone (MAXITROL) 3.5-82847-5.1 SUSP ophthalmic susp; 1-2 drops to affected ear every 4-6 hours for 7-10 days  Dispense: 1 Bottle; Refill: 0    Nasal congestion  The sinuses are air-filled spaces within the bones of the face. They connect to the inside of the nose. Sinusitis is an inflammation of the tissue lining the sinus cavity. Sinus inflammation can occur during a cold or hay-fever (allergies to pollens and other particles in the air) and cause symptoms of sinus congestion and fullness and perhaps a low-grade fever. These symptoms can last up to 7-10 days. This does not require antibiotic treatment.  Home Care:  1. Drink plenty of water, hot tea, and other liquids to stay well hydrated. This thins the mucus and promotes sinus drainage.  2. Apply heat to the painful areas of the face. Use a towel soaked in hot water. Or,  the shower and direct the hot spray onto your face. This is a good way to inhale warm water vapor and get heat on your face at the same time. (Cover your mouth and nose with your hands so you can still breathe as you do this.)  3. Use a vaporizer with products such as Vicks VapoRub (contains menthol) at night. Suck on peppermint, menthol or eucalyptus hard candies during the day.  4. An expectorant containing guaifenesin (such as Robitussin), helps to thin the mucus and promote drainage from the sinuses.  5. Over-the-counter decongestants may  be used unless a similar medicine was prescribed. Nasal sprays work the fastest. Use one that contains phenylephrine (Elpidio-synephrine, Sinex, Flonase or others). First blow the nose gently to remove mucus, then apply the drops. Do not use these medicines more often than directed on the label or for more than three days or symptoms may worsen. You may also use tablets containing pseudoephedrine (Sudafed). Many sinus remedies combine ingredients, which may increase side effects. Read the labels or ask the pharmacist for help. NOTE: Persons with high blood pressure should not use decongestants. They can raise blood pressure.  Look at CORICIDIN products instead.            6. Antihistamines are useful if allergies are a cause of your sinusitis. The mildest one is chlorpheniramine (available without a prescription). The dose for adults is 8-12mg three times a day. [NOTE: Do not use chlorpheniramine if you have glaucoma or if you are a man with trouble urinating due to an enlarged prostate.] Claritin (loratidine) is an antihistamine that causes less drowsiness and is a good alternative for daytime use.  7. When allergies are the cause for sinusitis, a saline nasal rinse alone may give relief. Saline nasal rinse reduces swelling and clears excess mucus. This allows sinuses to drain. Pre-packaged cans are available at most drug stores. These contain pre-mixed salt in an irrigation device.  8. You may use acetaminophen (Tylenol) or ibuprofen (Motrin, Advil) to control pain, unless another pain medicine was prescribed. [ NOTE: If you have chronic liver or kidney disease or ever had a stomach ulcer, talk with your doctor before using these medicines.] (Aspirin should never be used in anyone under 18 years of age who is ill with a fever. It may cause severe liver damage.)  9. Using a Neti pot, or pre-mixed nasal saline rinse daily. This will help clear your sinuses of mucous:  Nasal saline rinses          Rinses help keep your  sinuses and nose moist and flush out mucous. Use a pre-mixed rinse. Take a steam shower first. Then tilt your head to one side and spray. Allow the rinse to flow for a little bit to get into all the crevices. Then blow your nose. Repeat on the other nostril.  Humidification will also help- steam your head for 10 minutes, take a steam shower for 10 minutes, and ensure your dwelling has sufficient humidification.      Medications  Your doctor may prescribe medications to help treat your sinusitis. If you have an infection, antibiotics can help clear it up. If you are prescribed antibiotics, take all pills on schedule until they are gone, even if you feel better. Decongestants help relieve swelling. Use decongestant sprays for short periods only under the direction of your doctor. If you have allergies, your doctor may prescribe medications to help relieve them.   Decongestants- over-the-counter Pseudophedrine  Nasal corticosteroid spray- Nasonex, Flonase  Antihistamine- over-the-counter Claritin, Allegra, Zyrtec etc...   Apply Hot or Cold Packs  Applying heat to the area surrounding your sinuses may make you feel more comfortable. Use a hot water bottle or a hand towel dipped in hot water. Some people also find ice packs effective for relieving pain.  Get Prompt Medical Attention  if any of the following occur:    Worsening sinus pain or headache    Stiff neck    Unusual drowsiness, confusion or not acting like your normal self    Swelling of the forehead or eyelids    Vision problems including blurred or double vision    Fever of 100.4 F (38 C) or higher, or as directed by your healthcare provider    Seizure    [id number] [facility], [address] [place] [address], [place] [address]. All rights reserved. This information is not intended as a substitute for professional medical care. Always follow your healthcare professional's instructions.

## 2020-04-20 NOTE — PROGRESS NOTES
SUBJECTIVE   Obdulia Black is a  female who presents to clinic today for the following health issue(s):       ENT Symptoms             Symptoms: cc Present Absent Comment   Fever/Chills   x    Fatigue   x    Muscle Aches   x    Eye Irritation   x    Sneezing  x  Allergies    Nasal Xiang/Drg  x     Sinus Pressure/Pain  x     Loss of smell   x    Dental pain   x    Sore Throat   x    Swollen Glands   x    Ear Pain/Fullness  x  Left ear    Cough   x    Wheeze   x    Chest Pain   x    Shortness of breath   x    Rash       Other         Symptom duration:  1 day    Symptom severity:     Treatments tried:     Contacts:  Not that she knows of      History of allergic rhinitis  11/13/2019 AOM- Augmentin    Health Maintenance        Health Maintenance Due   Topic Date Due     PREVENTIVE CARE VISIT  09/21/2017     INFLUENZA VACCINE (1) 09/01/2019     HPV TEST  09/21/2019     PAP  09/21/2019     PHQ-9  03/03/2020       PCP   Physician No Ref-Primary None    PROBLEM LIST        Patient Active Problem List   Diagnosis     Esophageal reflux     Dysplasia of cervix, low grade (JAEL 1)     CARDIOVASCULAR SCREENING; LDL GOAL LESS THAN 160     Moderate major depression (H)     Tobacco use disorder     Allergic rhinitis     Prenatal care, first pregnancy     Numbness and tingling of both upper extremities while sleeping     Normal pregnancy     Vaginal delivery       MEDICATIONS        Current Outpatient Medications   Medication     cetirizine (ZYRTEC) 10 MG tablet     ciprofloxacin-dexamethasone (CIPRODEX) 0.3-0.1 % otic suspension     desogestrel-ethinyl estradiol (APRI) 0.15-30 MG-MCG tablet     fluticasone (FLONASE) 50 MCG/ACT nasal spray     sertraline (ZOLOFT) 100 MG tablet     No current facility-administered medications for this visit.        Reviewed and updated as needed this visit by Provider:  Tobacco  Allergies  Meds  Med Hx  Surg Hx  Fam Hx  Soc Hx     ROS      Constitutional, HEENT, cardiovascular, pulmonary,  gi and gu systems are negative, except as otherwise noted.    PHYSICAL EXAM   /78 (BP Location: Right arm, Patient Position: Sitting, Cuff Size: Adult Regular)   Pulse 102   Temp 97.2  F (36.2  C) (Tympanic)   Resp 18   Wt 80.1 kg (176 lb 9.6 oz)   SpO2 99%   BMI 31.28 kg/m    Body mass index is 31.28 kg/m .  GENERAL APPEARANCE: healthy, alert and no distress  EYES: Eyes grossly normal to inspection, PERRL and conjunctivae and sclerae normal  HENT: ear canals and TM's normal and nose and mouth without ulcers or lesions  LEFT TM- fluid posterior, no erythema, rhinorrhea  NECK: no adenopathy, no asymmetry, masses, or scars and thyroid normal to palpation  RESP: lungs clear to auscultation - no rales, rhonchi or wheezes  CV: regular rates and rhythm, normal S1 S2, no S3 or S4 and no murmur, click or rub  MS: extremities normal- no gross deformities noted  SKIN: no suspicious lesions or rashes  PSYCH: mentation appears normal and affect normal/bright    ASSESSMENT & PLAN     1. Dysfunction of left eustachian tube  Acute, stable  - ciprofloxacin-dexamethasone (CIPRODEX) 0.3-0.1 % otic suspension; Place 4 drops Into the left ear 2 times daily for 7 days  Dispense: 2.8 mL; Refill: 0    2. Allergic rhinitis due to animal hair and dander  Chronic, stable  - ciprofloxacin-dexamethasone (CIPRODEX) 0.3-0.1 % otic suspension; Place 4 drops Into the left ear 2 times daily for 7 days  Dispense: 2.8 mL; Refill: 0  Consider switching to Claritin, Allegra, or Xyzol  ADDENDUM:   D/C Ciprodex due to cost  - neomycin-polymyxin-dexamethasone (MAXITROL) 3.5-51251-4.1 SUSP ophthalmic susp; 1-2 drops to affected ear every 4-6 hours for 7-10 days  Dispense: 1 Bottle; Refill: 0    Nasal congestion  The sinuses are air-filled spaces within the bones of the face. They connect to the inside of the nose. Sinusitis is an inflammation of the tissue lining the sinus cavity. Sinus inflammation can occur during a cold or hay-fever  (allergies to pollens and other particles in the air) and cause symptoms of sinus congestion and fullness and perhaps a low-grade fever. These symptoms can last up to 7-10 days. This does not require antibiotic treatment.  Home Care:  1. Drink plenty of water, hot tea, and other liquids to stay well hydrated. This thins the mucus and promotes sinus drainage.  2. Apply heat to the painful areas of the face. Use a towel soaked in hot water. Or,  the shower and direct the hot spray onto your face. This is a good way to inhale warm water vapor and get heat on your face at the same time. (Cover your mouth and nose with your hands so you can still breathe as you do this.)  3. Use a vaporizer with products such as Vicks VapoRub (contains menthol) at night. Suck on peppermint, menthol or eucalyptus hard candies during the day.  4. An expectorant containing guaifenesin (such as Robitussin), helps to thin the mucus and promote drainage from the sinuses.  5. Over-the-counter decongestants may be used unless a similar medicine was prescribed. Nasal sprays work the fastest. Use one that contains phenylephrine (Elpidio-synephrine, Sinex, Flonase or others). First blow the nose gently to remove mucus, then apply the drops. Do not use these medicines more often than directed on the label or for more than three days or symptoms may worsen. You may also use tablets containing pseudoephedrine (Sudafed). Many sinus remedies combine ingredients, which may increase side effects. Read the labels or ask the pharmacist for help. NOTE: Persons with high blood pressure should not use decongestants. They can raise blood pressure.  Look at CORICIDIN products instead.            6. Antihistamines are useful if allergies are a cause of your sinusitis. The mildest one is chlorpheniramine (available without a prescription). The dose for adults is 8-12mg three times a day. [NOTE: Do not use chlorpheniramine if you have glaucoma or if you are a  man with trouble urinating due to an enlarged prostate.] Claritin (loratidine) is an antihistamine that causes less drowsiness and is a good alternative for daytime use.  7. When allergies are the cause for sinusitis, a saline nasal rinse alone may give relief. Saline nasal rinse reduces swelling and clears excess mucus. This allows sinuses to drain. Pre-packaged cans are available at most drug stores. These contain pre-mixed salt in an irrigation device.  8. You may use acetaminophen (Tylenol) or ibuprofen (Motrin, Advil) to control pain, unless another pain medicine was prescribed. [ NOTE: If you have chronic liver or kidney disease or ever had a stomach ulcer, talk with your doctor before using these medicines.] (Aspirin should never be used in anyone under 18 years of age who is ill with a fever. It may cause severe liver damage.)  9. Using a Neti pot, or pre-mixed nasal saline rinse daily. This will help clear your sinuses of mucous:  Nasal saline rinses          Rinses help keep your sinuses and nose moist and flush out mucous. Use a pre-mixed rinse. Take a steam shower first. Then tilt your head to one side and spray. Allow the rinse to flow for a little bit to get into all the crevices. Then blow your nose. Repeat on the other nostril.  Humidification will also help- steam your head for 10 minutes, take a steam shower for 10 minutes, and ensure your dwelling has sufficient humidification.      Medications  Your doctor may prescribe medications to help treat your sinusitis. If you have an infection, antibiotics can help clear it up. If you are prescribed antibiotics, take all pills on schedule until they are gone, even if you feel better. Decongestants help relieve swelling. Use decongestant sprays for short periods only under the direction of your doctor. If you have allergies, your doctor may prescribe medications to help relieve them.   Decongestants- over-the-counter Pseudophedrine  Nasal corticosteroid  spray- Nasonex, Flonase  Antihistamine- over-the-counter Claritin, Allegra, Zyrtec etc...   Apply Hot or Cold Packs  Applying heat to the area surrounding your sinuses may make you feel more comfortable. Use a hot water bottle or a hand towel dipped in hot water. Some people also find ice packs effective for relieving pain.  Get Prompt Medical Attention  if any of the following occur:    Worsening sinus pain or headache    Stiff neck    Unusual drowsiness, confusion or not acting like your normal self    Swelling of the forehead or eyelids    Vision problems including blurred or double vision    Fever of 100.4 F (38 C) or higher, or as directed by your healthcare provider    Seizure    [id number] [facility], [address] [place] [address], [place] [address]. All rights reserved. This information is not intended as a substitute for professional medical care. Always follow your healthcare professional's instructions.              Risks, benefits, side effects and rationale for treatment plan fully discussed with the patient and understanding expressed.  BERKLEY Marino-BC  [facility]

## 2020-04-20 NOTE — NURSING NOTE
"Chief Complaint   Patient presents with     Ear Problem       Initial /78 (BP Location: Right arm, Patient Position: Sitting, Cuff Size: Adult Regular)   Pulse 102   Temp 97.2  F (36.2  C) (Tympanic)   Resp 18   Wt 80.1 kg (176 lb 9.6 oz)   SpO2 99%   BMI 31.28 kg/m   Estimated body mass index is 31.28 kg/m  as calculated from the following:    Height as of 9/3/19: 1.6 m (5' 3\").    Weight as of this encounter: 80.1 kg (176 lb 9.6 oz).    Patient presents to the clinic using No DME    Health Maintenance that is potentially due pending provider review:  Pap Smear    Pt will schedule physical appt.    Is there anyone who you would like to be able to receive your results? No  If yes have patient fill out [facility]AMA    Saundra Hung CMA    "

## 2020-04-29 ENCOUNTER — TELEPHONE (OUTPATIENT)
Dept: FAMILY MEDICINE | Facility: CLINIC | Age: 35
End: 2020-04-29

## 2020-04-29 NOTE — TELEPHONE ENCOUNTER
Voice message left at 12:22 PM    Reason for call:  Patient reporting a symptom    Symptom or request: Pt says she saw [facility] on 4/20 for her ear. She said to check last Friday if not better. She says it really wasn't better but wanted to give it a few more days. She says it still hurts and even hurts now when she puts drops in. She would like to talk to someone to see what she should do.   Have you been treated for this before? Yes    Additional comments:      Phone Number patient can be reached at:  Home number on file [phone] (home)    Best Time:  Did not say    Can we leave a detailed message on this number:  Did not say    Call taken on 4/29/2020 at 12:43 PM by Julianna Leach

## 2020-04-29 NOTE — TELEPHONE ENCOUNTER
At this point she'll need to have her ear re-evaluated. When I saw her on 4/20/2020 her ears looked fine.   BERKLEY Ramirez

## 2020-04-30 ENCOUNTER — OFFICE VISIT (OUTPATIENT)
Dept: FAMILY MEDICINE | Facility: CLINIC | Age: 35
End: 2020-04-30
Payer: COMMERCIAL

## 2020-04-30 VITALS
BODY MASS INDEX: 31.68 KG/M2 | HEIGHT: 63 IN | OXYGEN SATURATION: 100 % | DIASTOLIC BLOOD PRESSURE: 70 MMHG | SYSTOLIC BLOOD PRESSURE: 122 MMHG | WEIGHT: 178.8 LBS | TEMPERATURE: 97.7 F | RESPIRATION RATE: 16 BRPM | HEART RATE: 98 BPM

## 2020-04-30 DIAGNOSIS — H60.502 ACUTE OTITIS EXTERNA OF LEFT EAR, UNSPECIFIED TYPE: Primary | ICD-10-CM

## 2020-04-30 PROCEDURE — 99213 OFFICE O/P EST LOW 20 MIN: CPT | Performed by: FAMILY MEDICINE

## 2020-04-30 RX ORDER — LEVOCETIRIZINE DIHYDROCHLORIDE 5 MG/1
5 TABLET, FILM COATED ORAL EVERY EVENING
COMMUNITY
End: 2021-03-13

## 2020-04-30 RX ORDER — NEOMYCIN SULFATE, POLYMYXIN B SULFATE AND HYDROCORTISONE 10; 3.5; 1 MG/ML; MG/ML; [USP'U]/ML
3 SUSPENSION/ DROPS AURICULAR (OTIC) 4 TIMES DAILY
Qty: 10 ML | Refills: 0 | Status: SHIPPED | OUTPATIENT
Start: 2020-04-30 | End: 2020-05-01

## 2020-04-30 ASSESSMENT — ANXIETY QUESTIONNAIRES
6. BECOMING EASILY ANNOYED OR IRRITABLE: MORE THAN HALF THE DAYS
GAD7 TOTAL SCORE: 3
5. BEING SO RESTLESS THAT IT IS HARD TO SIT STILL: NOT AT ALL
1. FEELING NERVOUS, ANXIOUS, OR ON EDGE: NOT AT ALL
7. FEELING AFRAID AS IF SOMETHING AWFUL MIGHT HAPPEN: NOT AT ALL
3. WORRYING TOO MUCH ABOUT DIFFERENT THINGS: SEVERAL DAYS
2. NOT BEING ABLE TO STOP OR CONTROL WORRYING: NOT AT ALL

## 2020-04-30 ASSESSMENT — PATIENT HEALTH QUESTIONNAIRE - PHQ9
SUM OF ALL RESPONSES TO PHQ QUESTIONS 1-9: 4
5. POOR APPETITE OR OVEREATING: NOT AT ALL

## 2020-04-30 ASSESSMENT — MIFFLIN-ST. JEOR: SCORE: 1470.16

## 2020-04-30 ASSESSMENT — PAIN SCALES - GENERAL: PAINLEVEL: MODERATE PAIN (4)

## 2020-04-30 NOTE — PROGRESS NOTES
Subjective     Obdulia Black is a 34 year old female who presents to clinic today for the following health issues:    HPI   ENT Symptoms             Symptoms: cc Present Absent Comment   Fever/Chills   x    Fatigue   x    Muscle Aches   x    Eye Irritation  x  Itchy, watery eyes from allergies   Sneezing  x     Nasal Xiang/Drg  x  Nasal drainage   Sinus Pressure/Pain   x    Loss of smell   x    Dental pain   x    Sore Throat   x    Swollen Glands   x    Ear Pain/Fullness x x  Left ear pain    Cough   x    Wheeze   x    Chest Pain   x    Shortness of breath   x    Rash   x    Other  x  headaches     Symptom duration:  sx's started 20   Symptom severity:  ear pain mild   Treatments tried:  Tylenol, Ibuprofen    Contacts:  none           Patient Active Problem List   Diagnosis     Esophageal reflux     Dysplasia of cervix, low grade (JAEL 1)     CARDIOVASCULAR SCREENING; LDL GOAL LESS THAN 160     Moderate major depression (H)     Tobacco use disorder     Allergic rhinitis     Prenatal care, first pregnancy     Numbness and tingling of both upper extremities while sleeping     Normal pregnancy     Vaginal delivery     Past Surgical History:   Procedure Laterality Date     MOUTH SURGERY      wisdom teeth x 4     PE TUBES  3/02/92     SURGICAL HISTORY OF -   03    Excision of cyst on left middle finger     TONSILLECTOMY & ADENOIDECTOMY  1991       Social History     Tobacco Use     Smoking status: Former Smoker     Last attempt to quit: 2008     Years since quittin.9     Smokeless tobacco: Never Used     Tobacco comment: 0   Substance Use Topics     Alcohol use: No     Alcohol/week: 0.0 standard drinks     Comment: 1-2 weekly- quit with pregnancy     Family History   Problem Relation Age of Onset     Hypertension Maternal Grandmother      Other Cancer Maternal Grandmother         leukemia     Gastrointestinal Disease Maternal Grandmother         diverticulitis     Diabetes Maternal Grandfather   "    Cerebrovascular Disease Maternal Grandfather      Heart Disease Maternal Grandfather         MI- open heart surgery     Eye Disorder Maternal Grandfather      Cancer Paternal Grandmother         leukemia     Prostate Cancer Paternal Grandfather      Alzheimer Disease Paternal Grandfather      Substance Abuse Paternal Grandfather         alcohol     Anemia Mother      Depression Mother      Rheumatoid Arthritis Father      Diabetes Maternal Uncle      Substance Abuse Brother         prescription pills         Current Outpatient Medications   Medication Sig Dispense Refill     desogestrel-ethinyl estradiol (APRI) 0.15-30 MG-MCG tablet Take 1 tablet by mouth daily 84 tablet 3     fluticasone (FLONASE) 50 MCG/ACT nasal spray Spray 2 sprays in nostril daily 1 Package 11     levocetirizine (XYZAL) 5 MG tablet Take 5 mg by mouth every evening       neomycin-polymyxin-hydrocortisone (CORTISPORIN) 3.5-66926-7 otic suspension Place 3 drops Into the left ear 4 times daily 10 mL 0     sertraline (ZOLOFT) 100 MG tablet Take 1 tablet (100 mg) by mouth daily 90 tablet 3     cetirizine (ZYRTEC) 10 MG tablet Take 1 tablet (10 mg) by mouth every evening 30 tablet 1     neomycin-polymyxin-dexamethasone (MAXITROL) 3.5-60048-9.1 SUSP ophthalmic susp 1-2 drops to affected ear every 4-6 hours for 7-10 days (Patient not taking: Reported on 4/30/2020) 1 Bottle 0     Allergies   Allergen Reactions     Sulfa Drugs Itching and Swelling       Reviewed and updated as needed this visit by Provider  Tobacco  Allergies  Meds  Problems  Med Hx  Surg Hx  Fam Hx         Review of Systems   ROS COMP: Constitutional, neuro, ENT, endocrine, pulmonary, cardiac, gastrointestinal, genitourinary, musculoskeletal, integument and psychiatric systems are negative, except as otherwise noted.       Objective    /70 (BP Location: Right arm, Cuff Size: Adult Regular)   Pulse 98   Temp 97.7  F (36.5  C) (Tympanic)   Resp 16   Ht 1.6 m (5' 3\")   " Wt 81.1 kg (178 lb 12.8 oz)   LMP 04/06/2020 (Approximate)   SpO2 100%   Breastfeeding No   BMI 31.67 kg/m    Body mass index is 31.67 kg/m .  Physical Exam   GENERAL: Pleasant, well appearing female.   HEENT: PERRL, EOMI, left tm perforation with serous effusion. Right tm normal.          Assessment & Plan     1. Acute otitis externa of left ear, unspecified type  Continue with flonase daily for eustachian tube dysfunction. Add cortisporin otic x 1 week. Discussed precautions for perf TM. Follow-up if not improving or if worsening.   - neomycin-polymyxin-hydrocortisone (CORTISPORIN) 3.5-17360-0 otic suspension; Place 3 drops Into the left ear 4 times daily  Dispense: 10 mL; Refill: 0       Return in about 2 weeks (around 5/14/2020) for if not improved or sooner if worsening.    Marquez Worrell MD  [facility]

## 2020-05-01 ENCOUNTER — TELEPHONE (OUTPATIENT)
Dept: FAMILY MEDICINE | Facility: CLINIC | Age: 35
End: 2020-05-01

## 2020-05-01 DIAGNOSIS — H60.502 ACUTE OTITIS EXTERNA OF LEFT EAR, UNSPECIFIED TYPE: Primary | ICD-10-CM

## 2020-05-01 ASSESSMENT — ANXIETY QUESTIONNAIRES: GAD7 TOTAL SCORE: 3

## 2020-05-01 NOTE — TELEPHONE ENCOUNTER
Reason for call:  Patient reporting a symptom    Symptom or request: Pt was Diagnosed with Ruptured Ear 4/30/20.  Pt said that when she places the Ear Drops in she has so much pain. No other medication given. This is the 2nd ear drop medication she received first one was 4/20   Please Advise.    Phone Number patient can be reached at:  Home number on file [phone] (home)    Best Time:  Any Time      Can we leave a detailed message on this number:  YES    Call taken on 5/1/2020 at 8:57 AM by Laurel Burnett

## 2020-05-01 NOTE — TELEPHONE ENCOUNTER
"S-(situation): Pt reporting pain 7-8/10 after applying Rx ear drops    B-(background): Seen in clinic 4/30/2020 and prescribed Cortisporin ear drops for acute otitis externa of left ear    A-(assessment):   \"It hurts worse\" when she puts the ear drops in and the pain lasts for \"a couple hours.\"  It hurts down into her cheek and neck.  She has been alternating Tylenol and ibuprofen.  She is administering the drops close to the canal and not far away which can cause more pain.  She has drainage coming from the ear but there is no blood.   She has not seen an ENT since hers retired (Dr. Soto at [place]).     R-(recommendations): Dr. Worrell, any recommendations?    Thank you,    Maddie GARNICA, RN, BSN        "

## 2020-05-07 ENCOUNTER — TELEPHONE (OUTPATIENT)
Dept: FAMILY MEDICINE | Facility: CLINIC | Age: 35
End: 2020-05-07

## 2020-05-07 ENCOUNTER — OFFICE VISIT (OUTPATIENT)
Dept: FAMILY MEDICINE | Facility: CLINIC | Age: 35
End: 2020-05-07
Payer: COMMERCIAL

## 2020-05-07 VITALS
WEIGHT: 181 LBS | TEMPERATURE: 97.9 F | RESPIRATION RATE: 18 BRPM | HEART RATE: 88 BPM | DIASTOLIC BLOOD PRESSURE: 72 MMHG | BODY MASS INDEX: 32.07 KG/M2 | HEIGHT: 63 IN | SYSTOLIC BLOOD PRESSURE: 122 MMHG

## 2020-05-07 DIAGNOSIS — H72.92 PERFORATION OF LEFT TYMPANIC MEMBRANE: Primary | ICD-10-CM

## 2020-05-07 PROCEDURE — 99213 OFFICE O/P EST LOW 20 MIN: CPT | Performed by: FAMILY MEDICINE

## 2020-05-07 RX ORDER — CEFDINIR 300 MG/1
300 CAPSULE ORAL 2 TIMES DAILY
Qty: 20 CAPSULE | Refills: 0 | Status: SHIPPED | OUTPATIENT
Start: 2020-05-07 | End: 2020-07-14

## 2020-05-07 ASSESSMENT — MIFFLIN-ST. JEOR: SCORE: 1475.14

## 2020-05-07 NOTE — PROGRESS NOTES
SUBJECTIVE   Obdulia Black is a 35 year old female who presents with     Ear pain      Duration: 2 weeks - not getting better     Description (location/character/radiation): Has a perforated ear drum in left ear    Intensity:  moderate    Accompanying signs and symptoms: pain seems to be going into her jaw and her head as well     History (similar episodes/previous evaluation): Chronic ear issues as a kid     Precipitating or alleviating factors:     Therapies tried and outcome: Is currently on an oral antibiotic and has had 2 different ear drops         PCP   Physician No Ref-Primary None    Health Maintenance        Health Maintenance Due   Topic Date Due     PREVENTIVE CARE VISIT  09/21/2017     HPV TEST  09/21/2019     PAP  09/21/2019       HPI        Patient Active Problem List   Diagnosis     Esophageal reflux     Dysplasia of cervix, low grade (JAEL 1)     CARDIOVASCULAR SCREENING; LDL GOAL LESS THAN 160     Moderate major depression (H)     Tobacco use disorder     Allergic rhinitis     Prenatal care, first pregnancy     Numbness and tingling of both upper extremities while sleeping     Normal pregnancy     Vaginal delivery     Current Outpatient Medications   Medication     amoxicillin-clavulanate (AUGMENTIN) 875-125 MG tablet     desogestrel-ethinyl estradiol (APRI) 0.15-30 MG-MCG tablet     fluticasone (FLONASE) 50 MCG/ACT nasal spray     levocetirizine (XYZAL) 5 MG tablet     sertraline (ZOLOFT) 100 MG tablet     cetirizine (ZYRTEC) 10 MG tablet     No current facility-administered medications for this visit.        Patient Active Problem List   Diagnosis     Esophageal reflux     Dysplasia of cervix, low grade (JAEL 1)     CARDIOVASCULAR SCREENING; LDL GOAL LESS THAN 160     Moderate major depression (H)     Tobacco use disorder     Allergic rhinitis     Prenatal care, first pregnancy     Numbness and tingling of both upper extremities while sleeping     Normal pregnancy     Vaginal delivery     Past  Surgical History:   Procedure Laterality Date     MOUTH SURGERY      wisdom teeth x 4     PE TUBES  3/02/92     SURGICAL HISTORY OF -   03    Excision of cyst on left middle finger     TONSILLECTOMY & ADENOIDECTOMY  1991       Social History     Tobacco Use     Smoking status: Former Smoker     Last attempt to quit: 2008     Years since quittin.9     Smokeless tobacco: Never Used     Tobacco comment: 0   Substance Use Topics     Alcohol use: No     Alcohol/week: 0.0 standard drinks     Comment: 1-2 weekly- quit with pregnancy     Family History   Problem Relation Age of Onset     Hypertension Maternal Grandmother      Other Cancer Maternal Grandmother         leukemia     Gastrointestinal Disease Maternal Grandmother         diverticulitis     Diabetes Maternal Grandfather      Cerebrovascular Disease Maternal Grandfather      Heart Disease Maternal Grandfather         MI- open heart surgery     Eye Disorder Maternal Grandfather      Cancer Paternal Grandmother         leukemia     Prostate Cancer Paternal Grandfather      Alzheimer Disease Paternal Grandfather      Substance Abuse Paternal Grandfather         alcohol     Anemia Mother      Depression Mother      Rheumatoid Arthritis Father      Diabetes Maternal Uncle      Substance Abuse Brother         prescription pills         Current Outpatient Medications   Medication Sig Dispense Refill     amoxicillin-clavulanate (AUGMENTIN) 875-125 MG tablet Take 1 tablet by mouth 2 times daily for 10 days 20 tablet 0     desogestrel-ethinyl estradiol (APRI) 0.15-30 MG-MCG tablet Take 1 tablet by mouth daily 84 tablet 3     fluticasone (FLONASE) 50 MCG/ACT nasal spray Spray 2 sprays in nostril daily 1 Package 11     levocetirizine (XYZAL) 5 MG tablet Take 5 mg by mouth every evening       sertraline (ZOLOFT) 100 MG tablet Take 1 tablet (100 mg) by mouth daily 90 tablet 3     cetirizine (ZYRTEC) 10 MG tablet Take 1 tablet (10 mg) by mouth every evening  "30 tablet 1     Allergies   Allergen Reactions     Sulfa Drugs Itching and Swelling     Recent Labs   Lab Test 03/05/12  0928   LDL 90   HDL 46*   TRIG 108      BP Readings from Last 3 Encounters:   05/07/20 122/72   04/30/20 122/70   04/20/20 122/78    Wt Readings from Last 3 Encounters:   05/07/20 82.1 kg (181 lb)   04/30/20 81.1 kg (178 lb 12.8 oz)   04/20/20 80.1 kg (176 lb 9.6 oz)                    Reviewed and updated:  Tobacco  Allergies  Meds  Med Hx  Surg Hx  Fam Hx  Soc Hx     ROS:  Constitutional, HEENT, cardiovascular, pulmonary, gi and gu systems are negative, except as otherwise noted.    PHYSICAL EXAM   /72 (Cuff Size: Adult Regular)   Pulse 88   Temp 97.9  F (36.6  C) (Tympanic)   Resp 18   Ht 1.6 m (5' 3\")   Wt 82.1 kg (181 lb)   LMP 05/07/2020   Breastfeeding No   BMI 32.06 kg/m    Body mass index is 32.06 kg/m .  GENERAL: alert and no distress  EYES: Eyes grossly normal to inspection, PERRL and conjunctivae and sclerae normal  HENT: normal cephalic/atraumatic, right ear: clear effusion, left ear: perforation inferiorly, no bleeding or discharge noted, nose and mouth without ulcers or lesions, oropharynx clear and oral mucous membranes moist  NECK: no adenopathy, no asymmetry, masses, or scars and thyroid normal to palpation  RESP: lungs clear to auscultation - no rales, rhonchi or wheezes  CV: regular rate and rhythm, normal S1 S2, no S3 or S4, no murmur, click or rub, no peripheral edema and peripheral pulses strong  ABDOMEN: soft, nontender, no hepatosplenomegaly, no masses and bowel sounds normal  MS: no gross musculoskeletal defects noted, no edema    Assessment & Plan     (H72.92) Perforation of left tympanic membrane  (primary encounter diagnosis)  Comment: Physical examination remarkable for small left tympanic membrane perforation.  History of recurrent ear infections in the past.  Patient has been using Augmentin without any significant benefit, could not tolerate " otic drops due to pain.  Treatment options discussed.  Suggested to use Omnicef and Augmentin discontinued.  Suggested to continue Flonase and Zyrtec.  ENT referral placed for further review and recommendations.  All question answered.    Plan: cefdinir (OMNICEF) 300 MG capsule,         OTOLARYNGOLOGY REFERRAL            Joby Shultz MD  [facility]

## 2020-05-07 NOTE — NURSING NOTE
"Chief Complaint   Patient presents with     Otalgia     /72 (Cuff Size: Adult Regular)   Pulse 88   Temp 97.9  F (36.6  C) (Tympanic)   Resp 18   Ht 1.6 m (5' 3\")   Wt 82.1 kg (181 lb)   LMP 05/07/2020   Breastfeeding No   BMI 32.06 kg/m   Estimated body mass index is 32.06 kg/m  as calculated from the following:    Height as of this encounter: 1.6 m (5' 3\").    Weight as of this encounter: 82.1 kg (181 lb).  Patient presents to the clinic using No DME      Health Maintenance that is potentially due pending provider review:    Health Maintenance Due   Topic Date Due     PREVENTIVE CARE VISIT  09/21/2017     HPV TEST  09/21/2019     PAP  09/21/2019                "

## 2020-05-07 NOTE — TELEPHONE ENCOUNTER
Reason for call:  Patient reporting a symptom    Symptom or request: Left Ear Pain continues. Pt has been seen x 2 for this issue. Pt is wondering what is the next step. Pt still taking the Antibiotic and Ear Drops.    Phone Number patient can be reached at:  Home number on file [phone] (home)    Best Time:  Any Time      Can we leave a detailed message on this number:  YES    Call taken on 5/7/2020 at 8:02 AM by Laurel Burnett

## 2020-05-07 NOTE — TELEPHONE ENCOUNTER
She should be seen in clinic. Most likely we will refer to ENT but she should be evaluated in person again to take a look at the ear.     Humphrey Hutson PA-C

## 2020-05-07 NOTE — TELEPHONE ENCOUNTER
LM to call and set up appt face to face.  [facility]  [facility] [place]
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
## 2020-05-13 ENCOUNTER — OFFICE VISIT (OUTPATIENT)
Dept: AUDIOLOGY | Facility: CLINIC | Age: 35
End: 2020-05-13
Attending: FAMILY MEDICINE
Payer: COMMERCIAL

## 2020-05-13 ENCOUNTER — OFFICE VISIT (OUTPATIENT)
Dept: OTOLARYNGOLOGY | Facility: CLINIC | Age: 35
End: 2020-05-13
Attending: FAMILY MEDICINE
Payer: COMMERCIAL

## 2020-05-13 VITALS — HEART RATE: 101 BPM | DIASTOLIC BLOOD PRESSURE: 90 MMHG | TEMPERATURE: 97.7 F | SYSTOLIC BLOOD PRESSURE: 135 MMHG

## 2020-05-13 DIAGNOSIS — H90.12 CONDUCTIVE HEARING LOSS OF LEFT EAR WITH UNRESTRICTED HEARING OF RIGHT EAR: ICD-10-CM

## 2020-05-13 DIAGNOSIS — H72.92 PERFORATION OF TYMPANIC MEMBRANE, LEFT: Primary | ICD-10-CM

## 2020-05-13 DIAGNOSIS — Z96.22 HISTORY OF PLACEMENT OF EAR TUBES: ICD-10-CM

## 2020-05-13 DIAGNOSIS — H72.92 EAR DRUM PERFORATION, LEFT: Primary | ICD-10-CM

## 2020-05-13 DIAGNOSIS — H93.13 TINNITUS, BILATERAL: ICD-10-CM

## 2020-05-13 PROCEDURE — 92567 TYMPANOMETRY: CPT | Performed by: AUDIOLOGIST

## 2020-05-13 PROCEDURE — 92557 COMPREHENSIVE HEARING TEST: CPT | Performed by: AUDIOLOGIST

## 2020-05-13 PROCEDURE — 99207 ZZC NO CHARGE LOS: CPT | Performed by: AUDIOLOGIST

## 2020-05-13 PROCEDURE — 99203 OFFICE O/P NEW LOW 30 MIN: CPT | Mod: 25 | Performed by: OTOLARYNGOLOGY

## 2020-05-13 PROCEDURE — 92504 EAR MICROSCOPY EXAMINATION: CPT | Performed by: OTOLARYNGOLOGY

## 2020-05-13 ASSESSMENT — MIFFLIN-ST. JEOR: SCORE: 1475.14

## 2020-05-13 NOTE — PATIENT INSTRUCTIONS
Per physician instructions      If you have questions or concerns on any instructions given to you by your provider today or if you need to schedule an appointment, you can reach us at [phone].

## 2020-05-13 NOTE — PROGRESS NOTES
AUDIOLOGY REPORT    SUBJECTIVE:  Obdulia Black is a 35 year old female who was seen at [facility] for an audiologic evaluation, referred by Dr. Feliciano.  No previous audiograms are available at today's appointment.  The patient reports a history of left ear perforation with pain and decreased hearing . The patient denies bilateral drainage.     OBJECTIVE:    Otoscopic exam indicates ears are clear of cerumen bilaterally       Pure Tone Thresholds assessed using conventional audiometry with good  reliability from 250-8000 Hz bilaterally using circumaural headphones     RIGHT:  normal hearing thresholds    LEFT:    normal and mild conductive hearing loss    Tympanogram:    RIGHT: normal eardrum mobility    LEFT:   large ear canal volume consistent with tympanic membrane perforation    Speech Reception Threshold:    RIGHT: 15 dB HL    LEFT:   20 dB HL  Word Recognition Score:     RIGHT: 100% at 55 dB HL using NU-6 recorded word list.    LEFT:   100% at 60 dB HL using NU-6 recorded word list.      ASSESSMENT:   Normal hearing in the right ear with a mild to normal conductive hearing loss in the left ear.     Today s results were discussed with the patient in detail.     PLAN: It is recommended that the patient be seen by Dr. Feliciano for medical evaluation of their ears and hearing evaluation.  Please contact the clinic with questions regarding these results or recommendations.        Kimberly Morrison M.A. F-AAA  Clinical audiologist [place] # [id number]  5/13/2020

## 2020-05-13 NOTE — NURSING NOTE
"Initial BP (!) 135/90 (BP Location: Right arm, Patient Position: Sitting, Cuff Size: Adult Regular)   Pulse 101   Temp 97.7  F (36.5  C) (Tympanic)   Ht (P) 1.6 m (5' 3\")   Wt (P) 82.1 kg (181 lb)   LMP 05/07/2020   BMI (P) 32.06 kg/m   Estimated body mass index is 32.06 kg/m  (pended) as calculated from the following:    Height as of this encounter: (P) 1.6 m (5' 3\").    Weight as of this encounter: (P) 82.1 kg (181 lb). .    Lizzie Solorio CMA    " No
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
## 2020-07-12 ASSESSMENT — ENCOUNTER SYMPTOMS
COUGH: 0
BREAST MASS: 0
DIZZINESS: 0
SORE THROAT: 0
NERVOUS/ANXIOUS: 0
WEAKNESS: 0
FREQUENCY: 0
EYE PAIN: 0
CHILLS: 0
HEADACHES: 1
PALPITATIONS: 0
PARESTHESIAS: 0
SHORTNESS OF BREATH: 0
ARTHRALGIAS: 1
JOINT SWELLING: 1
CONSTIPATION: 0
NAUSEA: 0
MYALGIAS: 0
DIARRHEA: 0
HEARTBURN: 1
ABDOMINAL PAIN: 0
DYSURIA: 0
HEMATOCHEZIA: 0
HEMATURIA: 0
FEVER: 0

## 2020-07-14 ENCOUNTER — OFFICE VISIT (OUTPATIENT)
Dept: FAMILY MEDICINE | Facility: CLINIC | Age: 35
End: 2020-07-14
Payer: COMMERCIAL

## 2020-07-14 VITALS
TEMPERATURE: 98.7 F | RESPIRATION RATE: 14 BRPM | DIASTOLIC BLOOD PRESSURE: 72 MMHG | HEIGHT: 63 IN | OXYGEN SATURATION: 99 % | BODY MASS INDEX: 31.89 KG/M2 | SYSTOLIC BLOOD PRESSURE: 130 MMHG | HEART RATE: 72 BPM | WEIGHT: 180 LBS

## 2020-07-14 DIAGNOSIS — Z00.01 ENCOUNTER FOR ROUTINE ADULT PHYSICAL EXAM WITH ABNORMAL FINDINGS: Primary | ICD-10-CM

## 2020-07-14 DIAGNOSIS — M25.50 MULTIPLE JOINT PAIN: ICD-10-CM

## 2020-07-14 DIAGNOSIS — Z12.4 CERVICAL CANCER SCREENING: ICD-10-CM

## 2020-07-14 DIAGNOSIS — F43.21 ADJUSTMENT DISORDER WITH DEPRESSED MOOD: ICD-10-CM

## 2020-07-14 DIAGNOSIS — Z30.41 ENCOUNTER FOR SURVEILLANCE OF CONTRACEPTIVE PILLS: ICD-10-CM

## 2020-07-14 LAB
CK SERPL-CCNC: 111 U/L (ref 30–225)
CRP SERPL-MCNC: 3.8 MG/L (ref 0–8)
ERYTHROCYTE [SEDIMENTATION RATE] IN BLOOD BY WESTERGREN METHOD: 7 MM/H (ref 0–20)
URATE SERPL-MCNC: 2.7 MG/DL (ref 2.6–6)

## 2020-07-14 PROCEDURE — 99214 OFFICE O/P EST MOD 30 MIN: CPT | Mod: 25 | Performed by: NURSE PRACTITIONER

## 2020-07-14 PROCEDURE — 99395 PREV VISIT EST AGE 18-39: CPT | Performed by: NURSE PRACTITIONER

## 2020-07-14 PROCEDURE — 87624 HPV HI-RISK TYP POOLED RSLT: CPT | Performed by: NURSE PRACTITIONER

## 2020-07-14 PROCEDURE — 86039 ANTINUCLEAR ANTIBODIES (ANA): CPT | Performed by: NURSE PRACTITIONER

## 2020-07-14 PROCEDURE — 82550 ASSAY OF CK (CPK): CPT | Performed by: NURSE PRACTITIONER

## 2020-07-14 PROCEDURE — 86038 ANTINUCLEAR ANTIBODIES: CPT | Performed by: NURSE PRACTITIONER

## 2020-07-14 PROCEDURE — 84550 ASSAY OF BLOOD/URIC ACID: CPT | Performed by: NURSE PRACTITIONER

## 2020-07-14 PROCEDURE — 86140 C-REACTIVE PROTEIN: CPT | Performed by: NURSE PRACTITIONER

## 2020-07-14 PROCEDURE — 86618 LYME DISEASE ANTIBODY: CPT | Performed by: NURSE PRACTITIONER

## 2020-07-14 PROCEDURE — 36415 COLL VENOUS BLD VENIPUNCTURE: CPT | Performed by: NURSE PRACTITIONER

## 2020-07-14 PROCEDURE — G0145 SCR C/V CYTO,THINLAYER,RESCR: HCPCS | Performed by: NURSE PRACTITIONER

## 2020-07-14 PROCEDURE — 85652 RBC SED RATE AUTOMATED: CPT | Performed by: NURSE PRACTITIONER

## 2020-07-14 PROCEDURE — 86431 RHEUMATOID FACTOR QUANT: CPT | Performed by: NURSE PRACTITIONER

## 2020-07-14 RX ORDER — SERTRALINE HYDROCHLORIDE 100 MG/1
100 TABLET, FILM COATED ORAL DAILY
Qty: 90 TABLET | Refills: 3 | Status: SHIPPED | OUTPATIENT
Start: 2020-07-14 | End: 2022-01-03

## 2020-07-14 RX ORDER — DESOGESTREL AND ETHINYL ESTRADIOL 0.15-0.03
1 KIT ORAL DAILY
Qty: 84 TABLET | Refills: 3 | Status: SHIPPED | OUTPATIENT
Start: 2020-07-14 | End: 2022-01-03

## 2020-07-14 ASSESSMENT — ENCOUNTER SYMPTOMS
HEARTBURN: 1
PARESTHESIAS: 0
HEMATURIA: 0
SORE THROAT: 0
HEADACHES: 1
BREAST MASS: 0
HEMATOCHEZIA: 0
SHORTNESS OF BREATH: 0
EYE PAIN: 0
CHILLS: 0
NERVOUS/ANXIOUS: 0
ARTHRALGIAS: 1
WEAKNESS: 0
DIARRHEA: 0
DYSURIA: 0
DIZZINESS: 0
MYALGIAS: 0
FEVER: 0
ABDOMINAL PAIN: 0
COUGH: 0
CONSTIPATION: 0
PALPITATIONS: 0
NAUSEA: 0
FREQUENCY: 0
JOINT SWELLING: 1

## 2020-07-14 ASSESSMENT — MIFFLIN-ST. JEOR: SCORE: 1470.6

## 2020-07-14 ASSESSMENT — PAIN SCALES - GENERAL: PAINLEVEL: MILD PAIN (2)
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
## 2020-07-15 LAB
ANA PAT SER IF-IMP: ABNORMAL
ANA SER QL IF: ABNORMAL
ANA TITR SER IF: ABNORMAL {TITER}
B BURGDOR IGG+IGM SER QL: 0.13 (ref 0–0.89)
RHEUMATOID FACT SER NEPH-ACNC: <7 IU/ML (ref 0–20)

## 2020-07-16 LAB
COPATH REPORT: NORMAL
PAP: NORMAL

## 2020-07-19 ENCOUNTER — MYC MEDICAL ADVICE (OUTPATIENT)
Dept: FAMILY MEDICINE | Facility: CLINIC | Age: 35
End: 2020-07-19

## 2020-07-20 LAB
FINAL DIAGNOSIS: NORMAL
HPV HR 12 DNA CVX QL NAA+PROBE: NEGATIVE
HPV16 DNA SPEC QL NAA+PROBE: NEGATIVE
HPV18 DNA SPEC QL NAA+PROBE: NEGATIVE
SPECIMEN DESCRIPTION: NORMAL
SPECIMEN SOURCE CVX/VAG CYTO: NORMAL

## 2020-08-16 NOTE — PROGRESS NOTES
Chief Complaint   Patient presents with     Ear Problem     history of left TM perforation - returned today for recheck and hearing test/audio- c/o left ear feeling plugged and uncomfortable     History of Present Illness  Obdulia Black is a 35 year old female who presents today for ear follow-up.  The patient had a tympanic membrane perforation when I saw her back in May 2020.  She has a history of ear issues but developed perforation after her child struck her ear.  She not having any active infectious symptoms.  We decided for observation.  She returns today for follow-up and audiometric assessment.    Since last seeing the patient, the patient reports improvement in her hearing.  She denies any otalgia or otorrhea.  She usually has problems with her ears and her allergy symptoms act up.  She was undergoing allergy injection media therapy previously.  She stopped therapy about 9 years ago.  The patient had 1 set of ear tubes around 8 years old.  Aside from previous ear tubes, no other prior ear surgery.    Past Medical History  Patient Active Problem List   Diagnosis     Esophageal reflux     CARDIOVASCULAR SCREENING; LDL GOAL LESS THAN 160     Tobacco use disorder     Allergic rhinitis     Prenatal care, first pregnancy     Numbness and tingling of both upper extremities while sleeping     Normal pregnancy     Vaginal delivery     Current Medications    Current Outpatient Medications:      desogestrel-ethinyl estradiol (APRI) 0.15-30 MG-MCG tablet, Take 1 tablet by mouth daily, Disp: 84 tablet, Rfl: 3     fluticasone (FLONASE) 50 MCG/ACT nasal spray, Spray 2 sprays in nostril daily, Disp: 1 Package, Rfl: 11     levocetirizine (XYZAL) 5 MG tablet, Take 5 mg by mouth every evening, Disp: , Rfl:      montelukast (SINGULAIR) 10 MG tablet, Take 1 tablet (10 mg) by mouth At Bedtime, Disp: 90 tablet, Rfl: 1     sertraline (ZOLOFT) 100 MG tablet, Take 1 tablet (100 mg) by mouth daily, Disp: 90 tablet, Rfl:  3    Allergies  Allergies   Allergen Reactions     Sulfa Drugs Itching and Swelling       Social History  Social History     Socioeconomic History     Marital status:      Spouse name: Not on file     Number of children: Not on file     Years of education: Not on file     Highest education level: Not on file   Occupational History     Not on file   Social Needs     Financial resource strain: Not on file     Food insecurity     Worry: Not on file     Inability: Not on file     Transportation needs     Medical: Not on file     Non-medical: Not on file   Tobacco Use     Smoking status: Former Smoker     Packs/day: 0.00     Years: 10.00     Pack years: 0.00     Types: Cigarettes     Last attempt to quit: 2008     Years since quittin.2     Smokeless tobacco: Never Used     Tobacco comment: 0   Substance and Sexual Activity     Alcohol use: Not Currently     Alcohol/week: 0.0 standard drinks     Comment: 1-2 weekly- quit with pregnancy     Drug use: Not Currently     Types: Marijuana     Comment:       Sexual activity: Yes     Partners: Male     Birth control/protection: Condom, Pill     Comment:     Lifestyle     Physical activity     Days per week: Not on file     Minutes per session: Not on file     Stress: Not on file   Relationships     Social connections     Talks on phone: Not on file     Gets together: Not on file     Attends Zoroastrianism service: Not on file     Active member of club or organization: Not on file     Attends meetings of clubs or organizations: Not on file     Relationship status: Not on file     Intimate partner violence     Fear of current or ex partner: Not on file     Emotionally abused: Not on file     Physically abused: Not on file     Forced sexual activity: Not on file   Other Topics Concern     Parent/sibling w/ CABG, MI or angioplasty before 65F 55M? Yes   Social History Narrative     Not on file       Family History  Family History   Problem Relation Age of Onset      Hypertension Maternal Grandmother      Other Cancer Maternal Grandmother         leukemia     Gastrointestinal Disease Maternal Grandmother         diverticulitis     Thyroid Disease Maternal Grandmother      Diabetes Maternal Grandfather      Cerebrovascular Disease Maternal Grandfather      Heart Disease Maternal Grandfather         MI- open heart surgery     Eye Disorder Maternal Grandfather      Depression Maternal Grandfather      Cancer Paternal Grandmother         leukemia     Prostate Cancer Paternal Grandfather      Alzheimer Disease Paternal Grandfather      Substance Abuse Paternal Grandfather         alcohol     Anemia Mother      Depression Mother      Anxiety Disorder Mother      Rheumatoid Arthritis Father      Diabetes Maternal Uncle      Substance Abuse Brother         prescription pills     Asthma Sister         Half sister     Lupus Other        Review of Systems  As per HPI and PMHx, otherwise 10 system review including the head and neck, constitutional, eyes, respiratory, GI, skin, neurologic, lymphatic, endocrine, and allergy systems is negative.    Physical Exam  /68 (BP Location: Right arm, Patient Position: Chair, Cuff Size: Adult Regular)   Pulse 96   Temp 97.8  F (36.6  C) (Oral)   Wt 81.6 kg (180 lb)   BMI 31.89 kg/m    GENERAL: Patient is a pleasant, cooperative 35 year old female in no acute distress.  HEAD: Normocephalic, atraumatic.  Hair and scalp are normal.  EYES: Pupils are equal, round, reactive to light and accommodation.  Extraocular movements are intact.  The sclera nonicteric without injection.  The extraocular structures are normal.  EARS: Normal shape and symmetry.  No tenderness when palpating the mastoid or tragal areas bilaterally. Otoscopic exam on the right reveals a clear canal.  The right tympanic membrane was round, intact without evidence of effusion.  No retraction, granulation, drainage, or evidence of cholesteatoma.  Otoscopic exam on the left reveals  clear canal.  The left tympanic membrane was round, intact without evidence of effusion.  No retraction, granulation, drainage, or evidence of cholesteatoma.    NEUROLOGIC: Cranial nerves II through XII are grossly intact.  Voice is strong.  Facial nerve examination due to the patient wearing a mask.  CARDIOVASCULAR: Extremities are warm and well-perfused.  No significant peripheral edema.  RESPIRATORY: Patient has nonlabored breathing without cough, wheeze, stridor.  PSYCHIATRIC: Patient is alert and oriented.  Mood and affect appear normal.  SKIN: Warm and dry.  No scalp, face, or neck lesions noted.    Audiogram  The patient underwent an audiogram performed today.  My review of the audiogram shows normal hearing in both ears.  Pure-tone average is 13 dB on the right and 15 dB on the left.  Speech reception threshhold is 15 dB on the right and 15 dB on the left.  The patient had a type A tympanogram on the right and a type A tympanogram on the left.    Assessment and Plan     ICD-10-CM    1. History of placement of ear tubes  Z96.22 AUDIOLOGY ADULT REFERRAL     montelukast (SINGULAIR) 10 MG tablet   2. Normal ear exam  Z01.10 AUDIOLOGY ADULT REFERRAL     montelukast (SINGULAIR) 10 MG tablet   3. History of allergic rhinitis  Z87.09 AUDIOLOGY ADULT REFERRAL     montelukast (SINGULAIR) 10 MG tablet      It was my pleasure seeing Obdulia Black today in clinic.  Ultimately, her left perforation is healed.  Her audiometric assessment is excellent.  We discussed allergy management including daily antihistamine, Singulair at bedtime, nasal saline irrigation, and use of topical nasal steroid sprays.  I provided her with a prescription for Singulair.  She has access to histamines and topical nasal sprays.  I would recommend the patient return to clinic as needed with ear problems or concerns.  If she continues to have allergy issues, we could also consider repeat referral to allergy.    Maxx Feliciano MD  Department of  Otolarygology-Head and Neck Surgery  [facility]

## 2020-08-17 ENCOUNTER — OFFICE VISIT (OUTPATIENT)
Dept: AUDIOLOGY | Facility: CLINIC | Age: 35
End: 2020-08-17
Payer: COMMERCIAL

## 2020-08-17 ENCOUNTER — OFFICE VISIT (OUTPATIENT)
Dept: OTOLARYNGOLOGY | Facility: CLINIC | Age: 35
End: 2020-08-17
Payer: COMMERCIAL

## 2020-08-17 VITALS
WEIGHT: 180 LBS | BODY MASS INDEX: 31.89 KG/M2 | TEMPERATURE: 97.8 F | DIASTOLIC BLOOD PRESSURE: 68 MMHG | HEART RATE: 96 BPM | SYSTOLIC BLOOD PRESSURE: 133 MMHG

## 2020-08-17 DIAGNOSIS — Z01.10 NORMAL EAR EXAM: ICD-10-CM

## 2020-08-17 DIAGNOSIS — Z96.22 HISTORY OF PLACEMENT OF EAR TUBES: Primary | ICD-10-CM

## 2020-08-17 DIAGNOSIS — Z87.09 HISTORY OF ALLERGIC RHINITIS: ICD-10-CM

## 2020-08-17 DIAGNOSIS — Z01.10 EXAMINATION OF EARS AND HEARING: Primary | ICD-10-CM

## 2020-08-17 PROCEDURE — 92555 SPEECH THRESHOLD AUDIOMETRY: CPT | Performed by: AUDIOLOGIST

## 2020-08-17 PROCEDURE — 99213 OFFICE O/P EST LOW 20 MIN: CPT | Performed by: OTOLARYNGOLOGY

## 2020-08-17 PROCEDURE — 92567 TYMPANOMETRY: CPT | Performed by: AUDIOLOGIST

## 2020-08-17 PROCEDURE — 99207 ZZC NO CHARGE LOS: CPT | Performed by: AUDIOLOGIST

## 2020-08-17 PROCEDURE — 92553 AUDIOMETRY AIR & BONE: CPT | Performed by: AUDIOLOGIST

## 2020-08-17 RX ORDER — MONTELUKAST SODIUM 10 MG/1
10 TABLET ORAL AT BEDTIME
Qty: 90 TABLET | Refills: 1 | Status: SHIPPED | OUTPATIENT
Start: 2020-08-17 | End: 2022-01-03

## 2020-08-17 ASSESSMENT — PAIN SCALES - GENERAL: PAINLEVEL: NO PAIN (0)

## 2020-08-17 NOTE — PATIENT INSTRUCTIONS
Per physician's instructions    1) Zyrtec daily.  2) Singular at bedtime.  3) Daily nasal saline irrigation followed by nasal steroid spray.    NASAL SALINE IRRIGATION INSTRUCTIONS    You will be starting nasal saline irrigations and will need to obtain the following:      - NeilMed Sinus Rinse 8 oz Kit  - Distilled or filtered water   - Normal saline salt packets    Place filtered or distilled water into the NeilMed bottle up to the fill line (DO NOT USE TAP OR WELL WATER).  Place the pre-made salt packet in the 8 oz of saline.  Shake the bottle to suspend into solution.  Lean head forward over a sink or a basin.  Rinse each side of the nose with one-half of the bottle (each squeeze is about one-half of the bottle). Rinse the nose daily.     If you use topical nasal sprays, apply following irrigation.    Video example: https://[email or web address].com/watch?v=[email or web address]

## 2020-08-17 NOTE — PROGRESS NOTES
AUDIOLOGY REPORT    SUBJECTIVE:  Obdulia Black is a 35 year old female who was seen at [facility] for an audiologic evaluation, referred by Dr. Feliciano.  The patient has been seen previously in this clinic  for assessment and results indicated a mild left ear conductive hearing loss. The patient reports she feels her left ear perforation has healed. The patient denies bilateral otalgia and bilateral drainage.     OBJECTIVE:    Otoscopic exam indicates ears are clear of cerumen bilaterally       Pure Tone Thresholds assessed using conventional audiometry with good  reliability from 250-8000 Hz bilaterally using circumaural headphones     RIGHT:  normal hearing thresholds    LEFT:    normal hearing thresholds    Tympanogram:    RIGHT: normal eardrum mobility    LEFT:   normal eardrum mobility    Speech Reception Threshold:    RIGHT: 15 dB HL    LEFT:   15 dB HL      ASSESSMENT:   Normal 3 month hearing assessment.     Compared to patient's previous audiogram dated 5/13/202, hearing has remained stable in the right ear and improved in the left ear.  Today s results were discussed with the patient in detail.     PLAN: It is recommended that the patient be seen by Dr. Feliciano for medical evaluation of their ears and hearing evaluation.  Please call this clinic with questions regarding these results or recommendations.        Kimberly Morrison M.A. MING-AAA  Clinical audiologist [place] # [id number]  8/17/2020

## 2020-08-17 NOTE — LETTER
8/17/2020         RE: Obdulia Black  [address] [place] [address]        Dear Colleague,    Thank you for referring your patient, Obdulia Black, to the [facility]. Please see a copy of my visit note below.    Chief Complaint   Patient presents with     Ear Problem     history of left TM perforation - returned today for recheck and hearing test/audio- c/o left ear feeling plugged and uncomfortable     History of Present Illness  Obdulia Black is a 35 year old female who presents today for ear follow-up.  The patient had a tympanic membrane perforation when I saw her back in May 2020.  She has a history of ear issues but developed perforation after her child struck her ear.  She not having any active infectious symptoms.  We decided for observation.  She returns today for follow-up and audiometric assessment.    Since last seeing the patient, the patient reports improvement in her hearing.  She denies any otalgia or otorrhea.  She usually has problems with her ears and her allergy symptoms act up.  She was undergoing allergy injection media therapy previously.  She stopped therapy about 9 years ago.  The patient had 1 set of ear tubes around 8 years old.  Aside from previous ear tubes, no other prior ear surgery.    Past Medical History  Patient Active Problem List   Diagnosis     Esophageal reflux     CARDIOVASCULAR SCREENING; LDL GOAL LESS THAN 160     Tobacco use disorder     Allergic rhinitis     Prenatal care, first pregnancy     Numbness and tingling of both upper extremities while sleeping     Normal pregnancy     Vaginal delivery     Current Medications    Current Outpatient Medications:      desogestrel-ethinyl estradiol (APRI) 0.15-30 MG-MCG tablet, Take 1 tablet by mouth daily, Disp: 84 tablet, Rfl: 3     fluticasone (FLONASE) 50 MCG/ACT nasal spray, Spray 2 sprays in nostril daily, Disp: 1 Package, Rfl: 11     levocetirizine (XYZAL) 5 MG tablet, Take 5 mg by  mouth every evening, Disp: , Rfl:      montelukast (SINGULAIR) 10 MG tablet, Take 1 tablet (10 mg) by mouth At Bedtime, Disp: 90 tablet, Rfl: 1     sertraline (ZOLOFT) 100 MG tablet, Take 1 tablet (100 mg) by mouth daily, Disp: 90 tablet, Rfl: 3    Allergies  Allergies   Allergen Reactions     Sulfa Drugs Itching and Swelling       Social History  Social History     Socioeconomic History     Marital status:      Spouse name: Not on file     Number of children: Not on file     Years of education: Not on file     Highest education level: Not on file   Occupational History     Not on file   Social Needs     Financial resource strain: Not on file     Food insecurity     Worry: Not on file     Inability: Not on file     Transportation needs     Medical: Not on file     Non-medical: Not on file   Tobacco Use     Smoking status: Former Smoker     Packs/day: 0.00     Years: 10.00     Pack years: 0.00     Types: Cigarettes     Last attempt to quit: 2008     Years since quittin.2     Smokeless tobacco: Never Used     Tobacco comment: 0   Substance and Sexual Activity     Alcohol use: Not Currently     Alcohol/week: 0.0 standard drinks     Comment: 1-2 weekly- quit with pregnancy     Drug use: Not Currently     Types: Marijuana     Comment:       Sexual activity: Yes     Partners: Male     Birth control/protection: Condom, Pill     Comment:     Lifestyle     Physical activity     Days per week: Not on file     Minutes per session: Not on file     Stress: Not on file   Relationships     Social connections     Talks on phone: Not on file     Gets together: Not on file     Attends Religion service: Not on file     Active member of club or organization: Not on file     Attends meetings of clubs or organizations: Not on file     Relationship status: Not on file     Intimate partner violence     Fear of current or ex partner: Not on file     Emotionally abused: Not on file     Physically abused: Not on file      Forced sexual activity: Not on file   Other Topics Concern     Parent/sibling w/ CABG, MI or angioplasty before 65F 55M? Yes   Social History Narrative     Not on file       Family History  Family History   Problem Relation Age of Onset     Hypertension Maternal Grandmother      Other Cancer Maternal Grandmother         leukemia     Gastrointestinal Disease Maternal Grandmother         diverticulitis     Thyroid Disease Maternal Grandmother      Diabetes Maternal Grandfather      Cerebrovascular Disease Maternal Grandfather      Heart Disease Maternal Grandfather         MI- open heart surgery     Eye Disorder Maternal Grandfather      Depression Maternal Grandfather      Cancer Paternal Grandmother         leukemia     Prostate Cancer Paternal Grandfather      Alzheimer Disease Paternal Grandfather      Substance Abuse Paternal Grandfather         alcohol     Anemia Mother      Depression Mother      Anxiety Disorder Mother      Rheumatoid Arthritis Father      Diabetes Maternal Uncle      Substance Abuse Brother         prescription pills     Asthma Sister         Half sister     Lupus Other        Review of Systems  As per HPI and PMHx, otherwise 10 system review including the head and neck, constitutional, eyes, respiratory, GI, skin, neurologic, lymphatic, endocrine, and allergy systems is negative.    Physical Exam  /68 (BP Location: Right arm, Patient Position: Chair, Cuff Size: Adult Regular)   Pulse 96   Temp 97.8  F (36.6  C) (Oral)   Wt 81.6 kg (180 lb)   BMI 31.89 kg/m    GENERAL: Patient is a pleasant, cooperative 35 year old female in no acute distress.  HEAD: Normocephalic, atraumatic.  Hair and scalp are normal.  EYES: Pupils are equal, round, reactive to light and accommodation.  Extraocular movements are intact.  The sclera nonicteric without injection.  The extraocular structures are normal.  EARS: Normal shape and symmetry.  No tenderness when palpating the mastoid or tragal areas  bilaterally. Otoscopic exam on the right reveals a clear canal.  The right tympanic membrane was round, intact without evidence of effusion.  No retraction, granulation, drainage, or evidence of cholesteatoma.  Otoscopic exam on the left reveals clear canal.  The left tympanic membrane was round, intact without evidence of effusion.  No retraction, granulation, drainage, or evidence of cholesteatoma.    NEUROLOGIC: Cranial nerves II through XII are grossly intact.  Voice is strong.  Facial nerve examination due to the patient wearing a mask.  CARDIOVASCULAR: Extremities are warm and well-perfused.  No significant peripheral edema.  RESPIRATORY: Patient has nonlabored breathing without cough, wheeze, stridor.  PSYCHIATRIC: Patient is alert and oriented.  Mood and affect appear normal.  SKIN: Warm and dry.  No scalp, face, or neck lesions noted.    Audiogram  The patient underwent an audiogram performed today.  My review of the audiogram shows normal hearing in both ears.  Pure-tone average is 13 dB on the right and 15 dB on the left.  Speech reception threshhold is 15 dB on the right and 15 dB on the left.  The patient had a type A tympanogram on the right and a type A tympanogram on the left.    Assessment and Plan     ICD-10-CM    1. History of placement of ear tubes  Z96.22 AUDIOLOGY ADULT REFERRAL     montelukast (SINGULAIR) 10 MG tablet   2. Normal ear exam  Z01.10 AUDIOLOGY ADULT REFERRAL     montelukast (SINGULAIR) 10 MG tablet   3. History of allergic rhinitis  Z87.09 AUDIOLOGY ADULT REFERRAL     montelukast (SINGULAIR) 10 MG tablet      It was my pleasure seeing Obdulia Black today in clinic.  Ultimately, her left perforation is healed.  Her audiometric assessment is excellent.  We discussed allergy management including daily antihistamine, Singulair at bedtime, nasal saline irrigation, and use of topical nasal steroid sprays.  I provided her with a prescription for Singulair.  She has access to histamines  and topical nasal sprays.  I would recommend the patient return to clinic as needed with ear problems or concerns.  If she continues to have allergy issues, we could also consider repeat referral to allergy.    Maxx Feliciano MD  Department of Otolarygology-Head and Neck Surgery  [facility]       Again, thank you for allowing me to participate in the care of your patient.        Sincerely,        Maxx Feliciano MD

## 2020-08-25 ENCOUNTER — MYC MEDICAL ADVICE (OUTPATIENT)
Dept: FAMILY MEDICINE | Facility: CLINIC | Age: 35
End: 2020-08-25

## 2020-08-26 NOTE — TELEPHONE ENCOUNTER
Left message and informed patient that she can print off lab results on her mychart and bring them to her appt

## 2020-09-29 ENCOUNTER — HOSPITAL ENCOUNTER (OUTPATIENT)
Dept: PHYSICAL THERAPY | Facility: CLINIC | Age: 35
Setting detail: THERAPIES SERIES
End: 2020-09-29
Attending: STUDENT IN AN ORGANIZED HEALTH CARE EDUCATION/TRAINING PROGRAM
Payer: COMMERCIAL

## 2020-09-29 PROCEDURE — 97161 PT EVAL LOW COMPLEX 20 MIN: CPT | Mod: GP | Performed by: PHYSICAL THERAPIST

## 2020-09-29 PROCEDURE — 97110 THERAPEUTIC EXERCISES: CPT | Mod: GP | Performed by: PHYSICAL THERAPIST

## 2020-09-29 PROCEDURE — 97140 MANUAL THERAPY 1/> REGIONS: CPT | Mod: GP | Performed by: PHYSICAL THERAPIST

## 2020-09-30 NOTE — PROGRESS NOTES
[facility]          OUTPATIENT PHYSICAL THERAPY ORTHOPEDIC EVALUATION  PLAN OF TREATMENT FOR OUTPATIENT REHABILITATION  (COMPLETE FOR INITIAL CLAIMS ONLY)  Patient's Last Name, First Name, M.I.  YOB: 1985  Obdulia Black    Provider s Name:  [facility]   Medical Record No.  [id number]   Start of Care Date:  09/29/20   Onset Date:  06/29/20   Type:     _X__PT   ___OT   ___SLP Medical Diagnosis:  R sided hip pain     PT Diagnosis:  R > L hip pain   Visits from SOC:  1      _________________________________________________________________________________  Plan of Treatment/Functional Goals:  balance training, gait training, joint mobilization, manual therapy, neuromuscular re-education, ROM, strengthening, stretching           Goals  Goal Identifier: 1  Goal Description: Pt will be independant with HEP for long term self management  Target Date: 11/11/20    Goal Identifier: 2  Goal Description: Pt will demonstrate 4+/5 global R LE strength to improve activity tolerance  Target Date: 11/11/20    Goal Identifier: 3  Goal Description: Pt will be able to stand/walk 1 hour without increased symptoms for work duties  Target Date: 11/11/20       Therapy Frequency:  1 time/week  Predicted Duration of Therapy Intervention:  6 weeks    Dottie Schrader, PT                 I CERTIFY THE NEED FOR THESE SERVICES FURNISHED UNDER        THIS PLAN OF TREATMENT AND WHILE UNDER MY CARE .             Physician Signature               Date    X_____________________________________________________                             Certification Date From:  09/29/20   Certification Date To:  11/25/20    Referring Provider:  Dr Roldan    Initial Assessment        See Epic Evaluation Start of Care Date: 09/29/20

## 2020-09-30 NOTE — PROGRESS NOTES
09/29/20 1500   General Information   Type of Visit Initial OP Ortho PT Evaluation   Start of Care Date 09/29/20   Referring Physician Dr Roldan   Patient/Family Goals Statement less pain working   Orders Evaluate and Treat   Date of Order 09/22/20   Certification Required? Yes   Medical Diagnosis R sided hip pain   Surgical/Medical history reviewed Yes   Precautions/Limitations no known precautions/limitations   Body Part(s)   Body Part(s) Hip   Presentation and Etiology   Pertinent history of current problem (include personal factors and/or comorbidities that impact the POC) Pt notes that has had chronic hip pain, right greater then left.  States that had XRays done showing arthritis on both sides. Sometimes feels like it will give out. States that dr told her she is on the track to a hip replacement. Denies clicking/popping.    Impairments A. Pain;B. Decreased WB tolerance;H. Impaired gait   Functional Limitations perform activities of daily living;perform required work activities;perform desired leisure / sports activities   Symptom Location Right > Left   How/Where did it occur From Degenerative Joint Disease;From insidious onset   Onset date of current episode/exacerbation 06/29/20   Chronicity Chronic   Pain rating (0-10 point scale) Best (/10);Worst (/10)   Best (/10) 2   Worst (/10) 7   Pain quality A. Sharp   Pain/symptoms exacerbated by A. Sitting;G. Certain positions   Progression of symptoms since onset: Worsened   Prior Level of Function   Functional Level Prior Comment Independant   Current Level of Function   Patient role/employment history A. Employed   Employment Comments Manager at [employer]   Fall Risk Screen   Have you fallen 2 or more times in the past year? Yes   Have you fallen and had an injury in the past year? No   Timed Up and Go score (seconds) 8   Is patient a fall risk? No   Abuse Screen (yes response referral indicated)   Feels Unsafe at Home or Work/School no   Feels Threatened by  "Someone no   Does Anyone Try to Keep You From Having Contact with Others or Doing Things Outside Your Home? no   Physical Signs of Abuse Present no   Hip Objective Findings   Side (if bilateral, select both right and left) Right;Left   Balance/Proprioception (Single Leg Stance) 10s , more instability on R   Lumbar ROM wfl   Pelvic Screen + provocation test, + ttp L    Straight Leg Raise Test negative   Scour Test neg L, positive R   SWETHA Test neg L, neg R   FADIR Test ant hip pain L, + groin pain R   Right Hip Flexion PROM 110, pain end range \"deep\"   Right Hip ER PROM 45, pain end range \"deep\"   Right Hip IR PROM 15, pain end range \"deep\"   Right Hip Flexion Strength 4/5   Right Hip Abduction Strength 3+/5   Right Hip IR Strength 4-/5   Right Hip ER Strength 4-/5   Right Knee Flexion Strength 5/5   Right Knee Extension Strength 5/5   Right Prone Quad Flexibility decreased   Left Hip Flexion PROM 130   Left Hip ER PROM 60   Left Hip IR PROM 20   Left Hip Flexion Strength 5/5   Left Hip Abduction Strength 4/5   Left Hip IR Strength 4/5   Left Hip ER Strength 4+/5   Left Knee Flexion Strength 5/5   Left Knee Extension Strength 5/5   Left Prone Quad Flexibility wnl   Planned Therapy Interventions   Planned Therapy Interventions balance training;gait training;joint mobilization;manual therapy;neuromuscular re-education;ROM;strengthening;stretching   Clinical Impression   Criteria for Skilled Therapeutic Interventions Met yes, treatment indicated   PT Diagnosis R > L hip pain   Influenced by the following impairments pain, decreased mobility, decreased flexibility, decreased strength   Functional limitations due to impairments decreased activity tolerance - standing, walking, work duties   Clinical Presentation Stable/Uncomplicated   Clinical Presentation Rationale motivated pt, comorbidities   Clinical Decision Making (Complexity) Low complexity   Therapy Frequency 1 time/week   Predicted Duration of Therapy " Intervention (days/wks) 6 weeks   Risk & Benefits of therapy have been explained Yes   Patient, Family & other staff in agreement with plan of care Yes   Education Assessment   Preferred Learning Style Demonstration;Pictures/video   Barriers to Learning No barriers   ORTHO GOALS   PT Ortho Eval Goals 1;2;3   Ortho Goal 1   Goal Identifier 1   Goal Description Pt will be independant with HEP for long term self management   Target Date 11/11/20   Ortho Goal 2   Goal Identifier 2   Goal Description Pt will demonstrate 4+/5 global R LE strength to improve activity tolerance   Target Date 11/11/20   Ortho Goal 3   Goal Identifier 3   Goal Description Pt will be able to stand/walk 1 hour without increased symptoms for work duties   Target Date 11/11/20   Total Evaluation Time   PT Eval, Low Complexity Minutes (54807) 20   Therapy Certification   Certification date from 09/29/20   Certification date to 11/25/20   Medical Diagnosis R sided hip pain

## 2020-10-07 ENCOUNTER — HOSPITAL ENCOUNTER (OUTPATIENT)
Dept: PHYSICAL THERAPY | Facility: CLINIC | Age: 35
Setting detail: THERAPIES SERIES
End: 2020-10-07
Attending: STUDENT IN AN ORGANIZED HEALTH CARE EDUCATION/TRAINING PROGRAM
Payer: COMMERCIAL

## 2020-10-07 PROCEDURE — 97140 MANUAL THERAPY 1/> REGIONS: CPT | Mod: GP | Performed by: PHYSICAL THERAPIST

## 2020-10-07 PROCEDURE — 97110 THERAPEUTIC EXERCISES: CPT | Mod: GP | Performed by: PHYSICAL THERAPIST

## 2020-10-14 ENCOUNTER — HOSPITAL ENCOUNTER (OUTPATIENT)
Dept: PHYSICAL THERAPY | Facility: CLINIC | Age: 35
Setting detail: THERAPIES SERIES
End: 2020-10-14
Attending: STUDENT IN AN ORGANIZED HEALTH CARE EDUCATION/TRAINING PROGRAM
Payer: COMMERCIAL

## 2020-10-14 PROCEDURE — 97140 MANUAL THERAPY 1/> REGIONS: CPT | Mod: GP | Performed by: PHYSICAL THERAPIST

## 2020-10-14 PROCEDURE — 97110 THERAPEUTIC EXERCISES: CPT | Mod: GP | Performed by: PHYSICAL THERAPIST

## 2020-10-21 ENCOUNTER — HOSPITAL ENCOUNTER (OUTPATIENT)
Dept: PHYSICAL THERAPY | Facility: CLINIC | Age: 35
Setting detail: THERAPIES SERIES
End: 2020-10-21
Attending: STUDENT IN AN ORGANIZED HEALTH CARE EDUCATION/TRAINING PROGRAM
Payer: COMMERCIAL

## 2020-10-21 PROCEDURE — 97140 MANUAL THERAPY 1/> REGIONS: CPT | Mod: GP | Performed by: PHYSICAL THERAPIST

## 2020-10-21 PROCEDURE — 97110 THERAPEUTIC EXERCISES: CPT | Mod: GP | Performed by: PHYSICAL THERAPIST

## 2020-10-28 ENCOUNTER — HOSPITAL ENCOUNTER (OUTPATIENT)
Dept: PHYSICAL THERAPY | Facility: CLINIC | Age: 35
Setting detail: THERAPIES SERIES
End: 2020-10-28
Attending: STUDENT IN AN ORGANIZED HEALTH CARE EDUCATION/TRAINING PROGRAM
Payer: COMMERCIAL

## 2020-10-28 PROCEDURE — 97140 MANUAL THERAPY 1/> REGIONS: CPT | Mod: GP | Performed by: PHYSICAL THERAPIST

## 2020-11-04 ENCOUNTER — HOSPITAL ENCOUNTER (OUTPATIENT)
Dept: PHYSICAL THERAPY | Facility: CLINIC | Age: 35
Setting detail: THERAPIES SERIES
End: 2020-11-04
Attending: STUDENT IN AN ORGANIZED HEALTH CARE EDUCATION/TRAINING PROGRAM
Payer: COMMERCIAL

## 2020-11-04 PROCEDURE — 97110 THERAPEUTIC EXERCISES: CPT | Mod: GP | Performed by: PHYSICAL THERAPIST

## 2020-11-04 PROCEDURE — 97140 MANUAL THERAPY 1/> REGIONS: CPT | Mod: GP | Performed by: PHYSICAL THERAPIST

## 2020-11-05 NOTE — PROGRESS NOTES
Outpatient Physical Therapy Discharge Note     Patient: Obdulia Black  : 1985    Beginning/End Dates of Reporting Period:  20 to 2020    Referring Provider: Dr Roldan    Therapy Diagnosis: R > L hip pain     Client Self Report: Pt notes that has been having quite a busy week at work. Overall hips are not doing too bad. Notes that mild soreness R hip but is after work and sitting in car.     Objective Measurements:  Objective Measure: Hip ROM  Details: 120* flexion, ER 65, IR 50. all painfree B today  Objective Measure: Hip strength MMT  Details: Hip abduction 4+/5     Goals:  Goal Identifier 1   Goal Description Pt will be independant with HEP for long term self management   Target Date 20   Date Met  20(I current program)   Progress:     Goal Identifier 2   Goal Description Pt will demonstrate 4+/5 global R LE strength to improve activity tolerance   Target Date 20   Date Met  20   Progress:     Goal Identifier 3   Goal Description Pt will be able to stand/walk 1 hour without increased symptoms for work duties   Target Date 20   Date Met  20   Progress:     Progress Toward Goals:   Progress this reporting period: Pt has completed 6 sessions PT to address R> L hip pain. Demonstrating improvements in ROM and strength, met goals and independent with home exercises for continued management.       Plan:  Discharge from therapy.    Discharge:    Reason for Discharge: Patient has met all goals.    Equipment Issued: na    Discharge Plan: Patient to continue home program.

## 2020-12-17 ENCOUNTER — VIRTUAL VISIT (OUTPATIENT)
Dept: FAMILY MEDICINE | Facility: OTHER | Age: 35
End: 2020-12-17
Payer: COMMERCIAL

## 2020-12-17 DIAGNOSIS — Z20.822 ENCOUNTER FOR LABORATORY TESTING FOR COVID-19 VIRUS: Primary | ICD-10-CM

## 2020-12-17 PROCEDURE — U0003 INFECTIOUS AGENT DETECTION BY NUCLEIC ACID (DNA OR RNA); SEVERE ACUTE RESPIRATORY SYNDROME CORONAVIRUS 2 (SARS-COV-2) (CORONAVIRUS DISEASE [COVID-19]), AMPLIFIED PROBE TECHNIQUE, MAKING USE OF HIGH THROUGHPUT TECHNOLOGIES AS DESCRIBED BY CMS-2020-01-R: HCPCS | Performed by: FAMILY MEDICINE

## 2020-12-17 PROCEDURE — 99421 OL DIG E/M SVC 5-10 MIN: CPT | Performed by: PHYSICIAN ASSISTANT

## 2020-12-17 NOTE — PROGRESS NOTES
"Date: 2020 10:27:01  Clinician: Hoang Logan  Clinician NPI: [id number]  Patient: Obdulia Black  Patient : 1985  Patient Address: [address], [place] [address]  Patient Phone: [phone]  Visit Protocol: URI  Patient Summary:  Obdulia is a 35 year old ( : 1985 ) female who initiated a [facility] Visit for COVID-19 (Coronavirus) evaluation and screening. When asked the question \"Please sign me up to receive news, health information and promotions from [facility].\", Obdulia responded \"No\".    When asked when her symptoms started, Obdulia reported that she does not have any symptoms.   She denies having recent facial or sinus surgery in the past 60 days and taking antibiotic medication in the past month.    Pertinent COVID-19 (Coronavirus) information  Obdulia does not work or volunteer as healthcare worker or a . In the past 14 days, Obdulia has not worked or volunteered at a healthcare facility or group living setting.   In the past 14 days, she also has not lived in a congregate living setting.   Obdulia has had a close contact with a laboratory-confirmed COVID-19 patient in the last 14 days. She was not exposed at her work. Date Obdulia was exposed to the laboratory-confirmed COVID-19 patient: 2020   Additional information about contact with COVID-19 (Coronavirus) patient as reported by the patient (free text): My grandma was laboratory diagnosed with COVID she had to be tested for it before she was to have surgery. She was tested on 2020 and was called the morning of 2020 with her results. 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 My work told me today I need to be tested before coming back to work I'm supposed to go back on 2020. My grandma was a-symptomatic we never would have known she had it if it hadn't been for her having surgery scheduled   Obdulia is not living in the same household with the COVID-19 positive patient. " She was in an enclosed space for greater than 15 minutes with the COVID-19 patient.   During the encounter, neither were wearing masks.   Obdulia has not been tested for COVID-19.   Pertinent medical history  Obdulia has asthma. She uses quick-relief inhaler less than two times per week. She refills her quick-relief inhaler less than two times per year. She wakes up at night with asthma symptoms less than two times per month.   Obdulia typically gets a yeast infection when she takes antibiotics. She has used fluconazole (Diflucan) to treat previous yeast infections. 2 doses of fluconazole (Diflucan) has typically been needed for symptoms to resolve in the past.  She has been told by her provider to avoid NSAIDs.   Obdulia does not have diabetes. She denies having immunosuppressive conditions (e.g., chemotherapy, HIV, organ transplant, long-term use of steroids or other immunosuppressive medications, splenectomy). She denies having congestive heart failure and severe COPD.   Obdulia needs a return to work/school note.   Obdulia does not smoke or use smokeless tobacco.   She denies pregnancy and denies breastfeeding. She has menstruated in the past month.   Weight: 175 lbs    MEDICATIONS: Flonase Allergy Relief nasal, meloxicam oral, Singulair oral, omeprazole oral, Zyrtec oral, Zoloft oral, ALLERGIES: Sulfa (Sulfonamide Antibiotics)  Clinician Response:  Dear Obdulia,   Based on your exposure to COVID-19 (coronavirus), we would like to test you for this virus.  1. Please call [phone] to schedule your visit. Explain that you were referred by [facility] to have a COVID-19 test. Be ready to share your [facility] visit ID number.  * If you need to schedule in [facility] please call [phone] or for [employer] employees please call [phone].   * If you need to schedule in the [place] area please call [phone]. [employer] employees call [phone].   The following will serve as your written order for this COVID Test, ordered by me, for  the indication of suspected COVID [Z20.828]: The test will be ordered in [employer], our electronic health record, after you are scheduled. It will show as ordered and authorized by Xavier Ross MD.  Order: COVID-19 (coronavirus) PCR for ASYMPTOMATIC EXPOSURE testing from [facility].   If you know you have had close contact with someone who tested positive, you should be quarantined for 14 days after this exposure. You should stay in quarantine for the14 days even if the covid test is negative, the optimal time to test after exposure is 5-7 days from the exposure  Quarantine means   What should I do?  For safety, it's very important to follow these rules. Do this for 14 days after the date you were last exposed to the virus..  Stay home and away from others. Don't go to school or anywhere else. Generally quarantine means staying home from work but there are some exceptions to this. Please contact your workplace.   No hugging, kissing or shaking hands.  Don't let anyone visit.  Cover your mouth and nose with a mask, tissue or washcloth to avoid spreading germs.  Wash your hands and face often. Use soap and water.  What are the symptoms of COVID-19?  The most common symptoms are cough, fever and trouble breathing. Less common symptoms include headache, body aches, fatigue (feeling very tired), chills, sore throat, stuffy or runny nose, diarrhea (loose poop), loss of taste or smell, belly pain, and nausea or vomiting (feeling sick to your stomach or throwing up).  After 14 days, if you have still don't have symptoms, you likely don't have this virus.  If you develop symptoms, follow these guidelines.  If you're normally healthy: Please [facility] to report your symptoms. Go to [email or web address].  If you have a serious health problem (like cancer, heart failure, an organ transplant or kidney disease): Call your specialty clinic. Let them know that you might have COVID-19.  2. When it's time for your COVID test:  Stay at  least 6 feet away from others. (If someone will drive you to your test, stay in the backseat, as far away from the  as you can.)  Cover your mouth and nose with a mask, tissue or washcloth.  Go straight to the testing site. Don't make any stops on the way there or back.  Please note  Caregivers in these groups are at risk for severe illness due to COVID-19:  o People 65 years and older  o People who live in a nursing home or long-term care facility  o People with chronic disease (lung, heart, cancer, diabetes, kidney, liver, immunologic)  o People who have a weakened immune system, including those who:  Are in cancer treatment  Take medicine that weakens the immune system, such as corticosteroids  Had a bone marrow or organ transplant  Have an immune deficiency  Have poorly controlled HIV or AIDS  Are obese (body mass index of 40 or higher)  Smoke regularly  Where can I get more information?  [facility] -- About COVID-19: [email or web address]  CDC -- What to Do If You're Sick: www.cdc.gov/coronavirus/2019-ncov/about/steps-when-sick.html  CDC -- Ending Home Isolation: www.cdc.gov/coronavirus/2019-ncov/hcp/disposition-in-home-patients.html  CDC -- Caring for Someone: www.cdc.gov/coronavirus/2019-ncov/if-you-are-sick/care-for-someone.html  [facility] -- Interim Guidance for Hospital Discharge to Home: www.health.[facility].[place]/diseases/coronavirus/hcp/hospdischarge.pdf  [facility] clinical trials (COVID-19 research studies): clinicalaffairs.[facility]/n-clinical-trials  Below are the COVID-19 hotlines at the [facility] of Health ([facility]). Interpreters are available.  For health questions: Call 269-027-8834 or 1-609.436.9142 (7 a.m. to 7 p.m.)  For questions about schools and childcare: Call 897-431-8120 or 1-768.580.7019 (7 a.m. to 7 p.m.)    Diagnosis: Contact with and (suspected) exposure to other viral communicable diseases  Diagnosis ICD: Z20.828  Additional Clinician Notes:   Please see your regular doctor for any return to work or school documentation once COVID testing is complete and results are available.

## 2020-12-18 LAB
SARS-COV-2 RNA SPEC QL NAA+PROBE: NOT DETECTED
SPECIMEN SOURCE: NORMAL

## 2021-01-15 ENCOUNTER — HEALTH MAINTENANCE LETTER (OUTPATIENT)
Age: 36
End: 2021-01-15

## 2021-03-13 ENCOUNTER — OFFICE VISIT (OUTPATIENT)
Dept: URGENT CARE | Facility: URGENT CARE | Age: 36
End: 2021-03-13
Payer: COMMERCIAL

## 2021-03-13 VITALS
SYSTOLIC BLOOD PRESSURE: 112 MMHG | OXYGEN SATURATION: 100 % | DIASTOLIC BLOOD PRESSURE: 70 MMHG | RESPIRATION RATE: 15 BRPM | TEMPERATURE: 97.2 F | BODY MASS INDEX: 32.24 KG/M2 | HEART RATE: 94 BPM | WEIGHT: 182 LBS

## 2021-03-13 DIAGNOSIS — J01.00 ACUTE MAXILLARY SINUSITIS, RECURRENCE NOT SPECIFIED: Primary | ICD-10-CM

## 2021-03-13 DIAGNOSIS — G44.019 EPISODIC CLUSTER HEADACHE, NOT INTRACTABLE: ICD-10-CM

## 2021-03-13 PROCEDURE — 99213 OFFICE O/P EST LOW 20 MIN: CPT | Performed by: EMERGENCY MEDICINE

## 2021-03-13 RX ORDER — CETIRIZINE HYDROCHLORIDE 10 MG/1
10 TABLET ORAL DAILY
COMMUNITY

## 2021-03-13 ASSESSMENT — PAIN SCALES - GENERAL: PAINLEVEL: SEVERE PAIN (7)

## 2021-03-13 NOTE — PATIENT INSTRUCTIONS
Start antibiotics right away today and take both doses today.    Tylenol 1000 mg every 4-6 hours as needed for headache.    Go to emergency department for any worsening headache.    Recheck 72 hours if no improvement.  Patient Education     Sinusitis (Antibiotic Treatment)    The sinuses are air-filled spaces within the bones of the face. They connect to the inside of the nose. Sinusitis is an inflammation of the tissue that lines the sinuses. Sinusitis can occur during a cold. It can also happen due to allergies to pollens and other particles in the air. Sinusitis can cause symptoms of sinus congestion and a feeling of fullness. A sinus infection causes fever, headache, and facial pain. There is often green or yellow fluid draining from the nose or into the back of the throat (post-nasal drip). You have been given antibiotics to treat this condition.   Home care  Take the full course of antibiotics as instructed. Don't stop taking them, even when you feel better.  Drink plenty of water, hot tea, and other liquids as directed by the healthcare provider. This may help thin nasal mucus. It also may help your sinuses drain fluids.  Heat may help soothe painful areas of your face. Use a towel soaked in hot water. Or,  the shower and direct the warm spray onto your face. Using a vaporizer along with a menthol rub at night may also help soothe symptoms.   An expectorant with guaifenesin may help thin nasal mucus and help your sinuses drain fluids. Talk with your provider or pharmacists before taking an over-the-counter (OTC) medicine if you have any questions about it or its side effects..  You can use an OTC decongestant, unless a similar medicine was prescribed to you. Nasal sprays work the fastest. Use one that contains phenylephrine or oxymetazoline. First blow your nose gently. Then use the spray. Don't use these medicines more often than directed on the label. If you do, your symptoms may get worse. You  may also take pills that contain pseudoephedrine. Don t use products that combine multiple medicines. This is because side effects may be increased. Read labels. You can also ask the pharmacist for help. (People with high blood pressure should not use decongestants. They can raise blood pressure.) Talk with your provider or pharmacist if you have any questions about the medicine..  OTC antihistamines may help if allergies contributed to your sinusitis. Talk with your provider or pharmacist if you have any questions about the medicine..  Don't use nasal rinses or irrigation during an acute sinus infection, unless your healthcare provider tells you to. Rinsing may spread the infection to other areas in your sinuses.  Use acetaminophen or ibuprofen to control pain, unless another pain medicine was prescribed to you. If you have chronic liver or kidney disease or ever had a stomach ulcer, talk with your healthcare provider before using these medicines. Never give aspirin to anyone under age 18 who is ill with a fever. It may cause severe liver damage.  Don't smoke. This can make symptoms worse.    Follow-up care  Follow up with your healthcare provider, or as advised.   When to seek medical advice  Call your healthcare provider if any of these occur:   Facial pain or headache that gets worse  Stiff neck  Unusual drowsiness or confusion  Swelling of your forehead or eyelids  Symptoms don't go away in 10 days  Vision problems, such as blurred or double vision  Fever of 100.4 F (38 C) or higher, or as directed by your healthcare provider  Call 911  Call 911 if any of these occur:   Seizure  Trouble breathing  Feeling dizzy or faint  Fingernails, skin or lips look blue, purple , or gray  Prevention  Here are steps you can take to help prevent an infection:   Keep good hand washing habits.  Don t have close contact with people who have sore throats, colds, or other upper respiratory infections.  Don t smoke, and stay away  from secondhand smoke.  Stay up to date with of your vaccines.  [employer] last reviewed this educational content on 12/1/2019 2000-2020 [employer]. All rights reserved. This information is not intended as a substitute for professional medical care. Always follow your healthcare professional's instructions.

## 2021-03-13 NOTE — PROGRESS NOTES
HPI: Patient is a 35-year-old female who presents to urgent care for headache which began at 2 AM this morning.  She describes it is bitemporal with some component in her occiput and a large component in her face especially the maxillary and frontal regions.  She has had recent exacerbation of her sinus allergies.  She also feels as if her left ear is plugged which has been present for about 3 weeks.  She does have a history of perforation involving that left ear TM in the past.  No fever.  No stiff neck.  No other focal neurologic complaints.      ROS: See HPI otherwise normal.    Allergies   Allergen Reactions     Sulfa Drugs Itching and Swelling      Current Outpatient Medications   Medication Sig Dispense Refill     amoxicillin-clavulanate (AUGMENTIN) 875-125 MG tablet Take 1 tablet by mouth 2 times daily for 10 days 20 tablet 0     cetirizine (ZYRTEC) 10 MG tablet Take 10 mg by mouth daily PRN       desogestrel-ethinyl estradiol (APRI) 0.15-30 MG-MCG tablet Take 1 tablet by mouth daily 84 tablet 3     fluticasone (FLONASE) 50 MCG/ACT nasal spray Spray 2 sprays in nostril daily 1 Package 11     montelukast (SINGULAIR) 10 MG tablet Take 1 tablet (10 mg) by mouth At Bedtime 90 tablet 1     sertraline (ZOLOFT) 100 MG tablet Take 1 tablet (100 mg) by mouth daily 90 tablet 3         PE: Physical exam reveals a 35-year-old female presents no acute distress.  Vital signs reveal her to be normotensive and afebrile.  She is alert and oriented x3.  PERRLA EOMI.  Cranial nerves are symmetrically intact.  Face reveals tenderness over the maxillary and frontal sinuses.  Her TMs are intact with no signs of infection bilaterally.  Neck is supple with no rigidity.  No adenopathy.  Peripheral neurologic exam is grossly intact.        TREATMENT: None      ASSESSMENT: Headache most likely on the basis of sinusitis based on the distribution of her pain.  No severe complicating symptoms at this time.      DIAGNOSIS: Headache.  Acute  sinusitis.      PLAN: Start Augmentin today.  Tylenol for headache.  Go to the emergency department for any worsening headache.  Recheck 72 hours if no improvement.

## 2021-08-18 ENCOUNTER — E-VISIT (OUTPATIENT)
Dept: FAMILY MEDICINE | Facility: CLINIC | Age: 36
End: 2021-08-18
Payer: COMMERCIAL

## 2021-08-18 DIAGNOSIS — Z20.822 SUSPECTED COVID-19 VIRUS INFECTION: Primary | ICD-10-CM

## 2021-08-18 PROCEDURE — 99421 OL DIG E/M SVC 5-10 MIN: CPT | Performed by: NURSE PRACTITIONER

## 2021-08-18 NOTE — PATIENT INSTRUCTIONS
Dear Obdulia Black,    Your symptoms show that you may have coronavirus (COVID-19). This illness can cause fever, cough and trouble breathing. Many people get a mild case and get better on their own. Some people can get very sick.    Will I be tested for COVID-19?  We would like to test you for Covid-19 virus. I have placed orders for this test.     To schedule: go to your [employer] home page and scroll down to the section that says  You have an appointment that needs to be scheduled  and click the large green button that says  Schedule Now  and follow the steps to find the next available openings.    If you are unable to complete these [employer] scheduling steps, please call [phone] to schedule your testing.     Return to work/school/ guidance:  Please let your workplace manager and staffing office know when your quarantine ends     We can t give you an exact date as it depends on the above. You can calculate this on your own or work with your manager/staffing office to calculate this. (For example if you were exposed on 10/4, you would have to quarantine for 14 full days. That would be through 10/18. You could return on 10/19.)      If you receive a positive COVID-19 test result, follow the guidance of the those who are giving you the results. Usually the return to work is 10 (or in some cases 20 days from symptom onset.) If you work at [facility], you must also be cleared by Employee Occupational Health and Safety to return to work.        If you receive a negative COVID-19 test result and did not have a high risk exposure to someone with a known positive COVID-19 test, you can return to work once you're free of fever for 24 hours without fever-reducing medication and your symptoms are improving or resolved.      If you receive a negative COVID-19 test and If you had a high risk exposure to someone who has tested positive for COVID-19 then you can return to work 14 days after your last contact  with the positive individual    Note: If you have ongoing exposure to the covid positive person, this quarantine period may be more than 14 days. (For example, if you are continued to be exposed to your child who tested positive and cannot isolate from them, then the quarantine of 7-14 days can't start until your child is no longer contagious. This is typically 10 days from onset of the child's symptoms. So the total duration may be 17-24 days in this case.)    Sign up for [employer].   We know it's scary to hear that you might have COVID-19. We want to track your symptoms to make sure you're okay over the next 2 weeks. Please look for an email from [employer]--this is a free, online program that we'll use to keep in touch. To sign up, follow the link in the email you will receive. Learn more at [email or web address]    How can I take care of myself?    Get lots of rest. Drink extra fluids (unless a doctor has told you not to)    Take Tylenol (acetaminophen) or ibuprofen for fever or pain. If you have liver or kidney problems, ask your family doctor if it's okay to take Tylenol o ibuprofen    If you have other health problems (like cancer, heart failure, an organ transplant or severe kidney disease): Call your specialty clinic if you don't feel better in the next 2 days.    Know when to call 911. Emergency warning signs include:  o Trouble breathing or shortness of breath  o Pain or pressure in the chest that doesn't go away  o Feeling confused like you haven't felt before, or not being able to wake up  o Bluish-colored lips or face    Where can I get more information?  [facility] - About COVID-19:   [email or web address]    CDC - What to Do If You're Sick:   www.cdc.gov/coronavirus/2019-ncov/about/steps-when-sick.html

## 2021-08-19 ENCOUNTER — LAB (OUTPATIENT)
Dept: FAMILY MEDICINE | Facility: CLINIC | Age: 36
End: 2021-08-19
Payer: COMMERCIAL

## 2021-08-19 DIAGNOSIS — Z20.822 SUSPECTED COVID-19 VIRUS INFECTION: ICD-10-CM

## 2021-08-19 PROCEDURE — U0003 INFECTIOUS AGENT DETECTION BY NUCLEIC ACID (DNA OR RNA); SEVERE ACUTE RESPIRATORY SYNDROME CORONAVIRUS 2 (SARS-COV-2) (CORONAVIRUS DISEASE [COVID-19]), AMPLIFIED PROBE TECHNIQUE, MAKING USE OF HIGH THROUGHPUT TECHNOLOGIES AS DESCRIBED BY CMS-2020-01-R: HCPCS

## 2021-08-19 PROCEDURE — U0005 INFEC AGEN DETEC AMPLI PROBE: HCPCS

## 2021-08-20 LAB — SARS-COV-2 RNA RESP QL NAA+PROBE: POSITIVE

## 2021-09-05 ENCOUNTER — HEALTH MAINTENANCE LETTER (OUTPATIENT)
Age: 36
End: 2021-09-05

## 2021-12-29 NOTE — PROGRESS NOTES
Chief Complaint   Patient presents with     Ear Problem     c/o left ear feeling plugged with some pain at intervals- patient planning trip to [place] soon concerned about flying- history of ruptured eardrum with flying in the past     History of Present Illness  Obdulia Black is a 36 year old female who presents today for ear follow-up.    I had seen the patient previously in May 2020 for presumed traumatic tympanic membrane perforation.  She was seen for follow-up in August 2020 with normal ear exam and audiometric assessment.  We have talked about medical management for her allergies when I saw her in 2020.  She returns today for follow-up with new onset ear issues.      Since last seeing the patient, the patient reports that her ears have been stable.  She has been having more trouble with ear pressure and fullness in the left ear.  She is considering flying to [place] in the near future and is worried about flying and she is had significant problems in the past flying with her ears.  She currently denies any catalina or persistent otalgia.  She denies otorrhea, bloody otorrhea.  No dizziness or vertigo.  The patient had 1 set of ear tubes around 8 years old.  Aside from previous ear tubes, no other prior ear surgery.  She does have a significant history of previous allergy issues.  She previously underwent allergy injection immunotherapy stopping about a decade ago.    Past Medical History  Patient Active Problem List   Diagnosis     Esophageal reflux     CARDIOVASCULAR SCREENING; LDL GOAL LESS THAN 160     Tobacco use disorder     Allergic rhinitis     Prenatal care, first pregnancy     Numbness and tingling of both upper extremities while sleeping     Normal pregnancy     Vaginal delivery     Current Medications    Current Outpatient Medications:      buPROPion (WELLBUTRIN XL) 300 MG 24 hr tablet, Take 300 mg by mouth every morning, Disp: , Rfl:      cetirizine (ZYRTEC) 10 MG tablet, Take 10 mg by mouth daily  PRN, Disp: , Rfl:      FLUoxetine (PROZAC) 40 MG capsule, Take 40 mg by mouth daily, Disp: , Rfl:      fluticasone (FLONASE) 50 MCG/ACT nasal spray, Spray 2 sprays in nostril daily, Disp: 1 Package, Rfl: 11     naltrexone (DEPADE/REVIA) 50 MG tablet, Take 50 mg by mouth daily, Disp: , Rfl:      QUEtiapine (SEROQUEL) 25 MG tablet, Take 25 mg by mouth At Bedtime Takes 1-2 tablets as needed, Disp: , Rfl:      vitamin D3 (CHOLECALCIFEROL) 50 mcg (2000 units) tablet, Take 1 tablet by mouth daily Takes 2 tablets daily, Disp: , Rfl:     Allergies  Allergies   Allergen Reactions     Sulfa Drugs Itching and Swelling       Social History  Social History     Socioeconomic History     Marital status:      Spouse name: Not on file     Number of children: Not on file     Years of education: Not on file     Highest education level: Not on file   Occupational History     Not on file   Tobacco Use     Smoking status: Former Smoker     Packs/day: 0.00     Years: 10.00     Pack years: 0.00     Types: Cigarettes     Quit date: 2008     Years since quittin.5     Smokeless tobacco: Never Used     Tobacco comment: 0   Substance and Sexual Activity     Alcohol use: Yes     Alcohol/week: 0.0 standard drinks     Comment: rare     Drug use: Not Currently     Types: Marijuana     Comment: last use 2019     Sexual activity: Yes     Partners: Male     Birth control/protection: Condom, Pill     Comment:     Other Topics Concern     Parent/sibling w/ CABG, MI or angioplasty before 65F 55M? Yes   Social History Narrative     Not on file     Social Determinants of Health     Financial Resource Strain: Not on file   Food Insecurity: Not on file   Transportation Needs: Not on file   Physical Activity: Not on file   Stress: Not on file   Social Connections: Not on file   Intimate Partner Violence: Not on file   Housing Stability: Not on file       Family History  Family History   Problem Relation Age of Onset     Hypertension  Maternal Grandmother      Other Cancer Maternal Grandmother         leukemia     Gastrointestinal Disease Maternal Grandmother         diverticulitis     Thyroid Disease Maternal Grandmother      Diabetes Maternal Grandfather      Cerebrovascular Disease Maternal Grandfather      Heart Disease Maternal Grandfather         MI- open heart surgery     Eye Disorder Maternal Grandfather      Depression Maternal Grandfather      Cancer Paternal Grandmother         leukemia     Prostate Cancer Paternal Grandfather      Alzheimer Disease Paternal Grandfather      Substance Abuse Paternal Grandfather         alcohol     Anemia Mother      Depression Mother      Anxiety Disorder Mother      Rheumatoid Arthritis Father      Diabetes Maternal Uncle      Substance Abuse Brother         prescription pills     Asthma Sister         Half sister     Lupus Other        Review of Systems  As per HPI and PMHx, otherwise 10 system review including the head and neck, constitutional, eyes, respiratory, GI, skin, neurologic, lymphatic, endocrine, and allergy systems is negative.    Physical Exam  /73 (BP Location: Right arm, Patient Position: Chair, Cuff Size: Adult Large)   Pulse 95   Temp 98  F (36.7  C) (Tympanic)   Wt 85.7 kg (189 lb)   BMI 33.48 kg/m    GENERAL: Patient is a pleasant, cooperative 36 year old female in no acute distress.  HEAD: Normocephalic, atraumatic.  Hair and scalp are normal.  EYES: Pupils are equal, round, reactive to light and accommodation.  Extraocular movements are intact.  The sclera nonicteric without injection.  The extraocular structures are normal.  EARS: Normal shape and symmetry.  No tenderness when palpating the mastoid or tragal areas bilaterally.  Otoscopic exam reveals a minimal amount of cerumen bilaterally.  The bilateral tympanic membranes are intact with some slight retraction.  There is no evidence of middle ear effusion in either ear.  There are no catalina retraction pockets,  granulation, drainage, or evidence of cholesteatoma.   NOSE: Nares are patent.  Nasal mucosa is boggy and inflamed with sticky, inflammatory mucus.  The patient has severe/marked inferior turbinate hypertrophy bilaterally.  No nasal cavity masses, polyps, or mucopurulence on anterior rhinoscopy.  NEUROLOGIC: Cranial nerves II through XII are grossly intact.  Voice is strong.  Patient is House-Brackmann I/VI bilaterally.  CARDIOVASCULAR: Extremities are warm and well-perfused.  No significant peripheral edema.  RESPIRATORY: Patient has nonlabored breathing without cough, wheeze, stridor.  PSYCHIATRIC: Patient is alert and oriented.  Mood and affect appear normal.  SKIN: Warm and dry.  No scalp, face, or neck lesions noted.    Audiogram  The patient underwent an audiogram performed today.  My review of the audiogram shows mild conductive hearing loss sloping to normal hearing in both ears.  Pure-tone average is 25 dB on the right and 25 dB on the left.  Speech reception threshold is 15 dB on the right and 20 dB on the left.  The patient had 100% word recognition on the right and 100% word recognition on the left.  The patient had a negative pressure type C tympanogram on the right and a negative pressure type C tympanogram on the left.    Assessment and Plan     ICD-10-CM    1. Dysfunction of both eustachian tubes  H69.83 Adult Audiology Referral   2. Conductive hearing loss, bilateral  H90.0 Adult Audiology Referral   3. History of placement of ear tubes  Z96.22 Adult Audiology Referral   4. History of allergic rhinitis  Z87.09 Adult Audiology Referral      It was my pleasure seeing Obdulia Black today in clinic.  Patient continues to have symptoms of eustachian tube dysfunction.  She has significant negative pressure in both ears on examination and audiometric assessment.  Fortunately, she does not have any severe retraction or retraction pockets.  Symptoms are definitely worse when she flies in an airplane.    We  discussed conservative measures to manage eustachian tube dysfunction including the trial of Otovent in combination with her current allergy treatments.  We discussed using an oral decongestant, nasal decongestant, filtered earplugs, and chewing gum during flight.    We discussed procedural management of eustachian tube dysfunction including bilateral ear tube placement, eustachian tube dilation, or both.    I will reach out to one of our rhinologist at [facility] to see if he does eustachian tube dilation awake in the clinic.  Currently on not set up to do dilation in the clinic and would have to do this in the operating room if I am able to obtain the eustachian tube balloon through [employer].  If the patient wanted to do ear tubes alone, I think we could do this awake in the clinic.    I will send the patient a [employer] message once I talk with Dr. Motta and check with [employer] regarding obtaining a balloon for here at [facility].    Maxx Feliciano MD  Department of Otolaryngology-Head and Neck Surgery  [facility]

## 2022-01-03 ENCOUNTER — OFFICE VISIT (OUTPATIENT)
Dept: OTOLARYNGOLOGY | Facility: CLINIC | Age: 37
End: 2022-01-03
Payer: COMMERCIAL

## 2022-01-03 ENCOUNTER — OFFICE VISIT (OUTPATIENT)
Dept: AUDIOLOGY | Facility: CLINIC | Age: 37
End: 2022-01-03
Payer: COMMERCIAL

## 2022-01-03 VITALS
BODY MASS INDEX: 33.48 KG/M2 | WEIGHT: 189 LBS | DIASTOLIC BLOOD PRESSURE: 73 MMHG | TEMPERATURE: 98 F | SYSTOLIC BLOOD PRESSURE: 124 MMHG | HEART RATE: 95 BPM

## 2022-01-03 DIAGNOSIS — Z87.09 HISTORY OF ALLERGIC RHINITIS: ICD-10-CM

## 2022-01-03 DIAGNOSIS — H69.93 DYSFUNCTION OF BOTH EUSTACHIAN TUBES: Primary | ICD-10-CM

## 2022-01-03 DIAGNOSIS — H90.0 CONDUCTIVE HEARING LOSS, BILATERAL: ICD-10-CM

## 2022-01-03 DIAGNOSIS — H69.93 DISORDER OF BOTH EUSTACHIAN TUBES: Primary | ICD-10-CM

## 2022-01-03 DIAGNOSIS — Z96.22 HISTORY OF PLACEMENT OF EAR TUBES: ICD-10-CM

## 2022-01-03 PROCEDURE — 99207 PR NO CHARGE LOS: CPT | Performed by: AUDIOLOGIST

## 2022-01-03 PROCEDURE — 99213 OFFICE O/P EST LOW 20 MIN: CPT | Performed by: OTOLARYNGOLOGY

## 2022-01-03 PROCEDURE — 92550 TYMPANOMETRY & REFLEX THRESH: CPT | Performed by: AUDIOLOGIST

## 2022-01-03 PROCEDURE — 92557 COMPREHENSIVE HEARING TEST: CPT | Performed by: AUDIOLOGIST

## 2022-01-03 RX ORDER — NALTREXONE HYDROCHLORIDE 50 MG/1
50 TABLET, FILM COATED ORAL DAILY
COMMUNITY
End: 2022-03-23

## 2022-01-03 RX ORDER — BUPROPION HYDROCHLORIDE 300 MG/1
300 TABLET ORAL EVERY MORNING
COMMUNITY
End: 2022-05-03

## 2022-01-03 RX ORDER — QUETIAPINE FUMARATE 25 MG/1
25 TABLET, FILM COATED ORAL AT BEDTIME
COMMUNITY
End: 2022-03-23

## 2022-01-03 RX ORDER — FLUOXETINE 40 MG/1
40 CAPSULE ORAL DAILY
COMMUNITY
End: 2022-05-03

## 2022-01-03 RX ORDER — CHOLECALCIFEROL (VITAMIN D3) 50 MCG
1 TABLET ORAL DAILY
COMMUNITY
End: 2022-03-23

## 2022-01-03 ASSESSMENT — PAIN SCALES - GENERAL: PAINLEVEL: NO PAIN (0)

## 2022-01-03 NOTE — LETTER
1/3/2022         RE: Obdulia Black  [address] [facility] [address]        Dear Colleague,    Thank you for referring your patient, Obdulia Black, to the [facility]. Please see a copy of my visit note below.    Chief Complaint   Patient presents with     Ear Problem     c/o left ear feeling plugged with some pain at intervals- patient planning trip to [place] soon concerned about flying- history of ruptured eardrum with flying in the past     History of Present Illness  Obdulia Black is a 36 year old female who presents today for ear follow-up.    I had seen the patient previously in May 2020 for presumed traumatic tympanic membrane perforation.  She was seen for follow-up in August 2020 with normal ear exam and audiometric assessment.  We have talked about medical management for her allergies when I saw her in 2020.  She returns today for follow-up with new onset ear issues.      Since last seeing the patient, the patient reports that her ears have been stable.  She has been having more trouble with ear pressure and fullness in the left ear.  She is considering flying to [place] in the near future and is worried about flying and she is had significant problems in the past flying with her ears.  She currently denies any catalina or persistent otalgia.  She denies otorrhea, bloody otorrhea.  No dizziness or vertigo.  The patient had 1 set of ear tubes around 8 years old.  Aside from previous ear tubes, no other prior ear surgery.  She does have a significant history of previous allergy issues.  She previously underwent allergy injection immunotherapy stopping about a decade ago.    Past Medical History  Patient Active Problem List   Diagnosis     Esophageal reflux     CARDIOVASCULAR SCREENING; LDL GOAL LESS THAN 160     Tobacco use disorder     Allergic rhinitis     Prenatal care, first pregnancy     Numbness and tingling of both upper extremities while sleeping      Normal pregnancy     Vaginal delivery     Current Medications    Current Outpatient Medications:      buPROPion (WELLBUTRIN XL) 300 MG 24 hr tablet, Take 300 mg by mouth every morning, Disp: , Rfl:      cetirizine (ZYRTEC) 10 MG tablet, Take 10 mg by mouth daily PRN, Disp: , Rfl:      FLUoxetine (PROZAC) 40 MG capsule, Take 40 mg by mouth daily, Disp: , Rfl:      fluticasone (FLONASE) 50 MCG/ACT nasal spray, Spray 2 sprays in nostril daily, Disp: 1 Package, Rfl: 11     naltrexone (DEPADE/REVIA) 50 MG tablet, Take 50 mg by mouth daily, Disp: , Rfl:      QUEtiapine (SEROQUEL) 25 MG tablet, Take 25 mg by mouth At Bedtime Takes 1-2 tablets as needed, Disp: , Rfl:      vitamin D3 (CHOLECALCIFEROL) 50 mcg (2000 units) tablet, Take 1 tablet by mouth daily Takes 2 tablets daily, Disp: , Rfl:     Allergies  Allergies   Allergen Reactions     Sulfa Drugs Itching and Swelling       Social History  Social History     Socioeconomic History     Marital status:      Spouse name: Not on file     Number of children: Not on file     Years of education: Not on file     Highest education level: Not on file   Occupational History     Not on file   Tobacco Use     Smoking status: Former Smoker     Packs/day: 0.00     Years: 10.00     Pack years: 0.00     Types: Cigarettes     Quit date: 2008     Years since quittin.5     Smokeless tobacco: Never Used     Tobacco comment: 0   Substance and Sexual Activity     Alcohol use: Yes     Alcohol/week: 0.0 standard drinks     Comment: rare     Drug use: Not Currently     Types: Marijuana     Comment: last use      Sexual activity: Yes     Partners: Male     Birth control/protection: Condom, Pill     Comment:     Other Topics Concern     Parent/sibling w/ CABG, MI or angioplasty before 65F 55M? Yes   Social History Narrative     Not on file     Social Determinants of Health     Financial Resource Strain: Not on file   Food Insecurity: Not on file   Transportation Needs:  Not on file   Physical Activity: Not on file   Stress: Not on file   Social Connections: Not on file   Intimate Partner Violence: Not on file   Housing Stability: Not on file       Family History  Family History   Problem Relation Age of Onset     Hypertension Maternal Grandmother      Other Cancer Maternal Grandmother         leukemia     Gastrointestinal Disease Maternal Grandmother         diverticulitis     Thyroid Disease Maternal Grandmother      Diabetes Maternal Grandfather      Cerebrovascular Disease Maternal Grandfather      Heart Disease Maternal Grandfather         MI- open heart surgery     Eye Disorder Maternal Grandfather      Depression Maternal Grandfather      Cancer Paternal Grandmother         leukemia     Prostate Cancer Paternal Grandfather      Alzheimer Disease Paternal Grandfather      Substance Abuse Paternal Grandfather         alcohol     Anemia Mother      Depression Mother      Anxiety Disorder Mother      Rheumatoid Arthritis Father      Diabetes Maternal Uncle      Substance Abuse Brother         prescription pills     Asthma Sister         Half sister     Lupus Other        Review of Systems  As per HPI and PMHx, otherwise 10 system review including the head and neck, constitutional, eyes, respiratory, GI, skin, neurologic, lymphatic, endocrine, and allergy systems is negative.    Physical Exam  /73 (BP Location: Right arm, Patient Position: Chair, Cuff Size: Adult Large)   Pulse 95   Temp 98  F (36.7  C) (Tympanic)   Wt 85.7 kg (189 lb)   BMI 33.48 kg/m    GENERAL: Patient is a pleasant, cooperative 36 year old female in no acute distress.  HEAD: Normocephalic, atraumatic.  Hair and scalp are normal.  EYES: Pupils are equal, round, reactive to light and accommodation.  Extraocular movements are intact.  The sclera nonicteric without injection.  The extraocular structures are normal.  EARS: Normal shape and symmetry.  No tenderness when palpating the mastoid or tragal  areas bilaterally.  Otoscopic exam reveals a minimal amount of cerumen bilaterally.  The bilateral tympanic membranes are intact with some slight retraction.  There is no evidence of middle ear effusion in either ear.  There are no catalina retraction pockets, granulation, drainage, or evidence of cholesteatoma.   NOSE: Nares are patent.  Nasal mucosa is boggy and inflamed with sticky, inflammatory mucus.  The patient has severe/marked inferior turbinate hypertrophy bilaterally.  No nasal cavity masses, polyps, or mucopurulence on anterior rhinoscopy.  NEUROLOGIC: Cranial nerves II through XII are grossly intact.  Voice is strong.  Patient is House-Brackmann I/VI bilaterally.  CARDIOVASCULAR: Extremities are warm and well-perfused.  No significant peripheral edema.  RESPIRATORY: Patient has nonlabored breathing without cough, wheeze, stridor.  PSYCHIATRIC: Patient is alert and oriented.  Mood and affect appear normal.  SKIN: Warm and dry.  No scalp, face, or neck lesions noted.    Audiogram  The patient underwent an audiogram performed today.  My review of the audiogram shows mild conductive hearing loss sloping to normal hearing in both ears.  Pure-tone average is 25 dB on the right and 25 dB on the left.  Speech reception threshold is 15 dB on the right and 20 dB on the left.  The patient had 100% word recognition on the right and 100% word recognition on the left.  The patient had a negative pressure type C tympanogram on the right and a negative pressure type C tympanogram on the left.    Assessment and Plan     ICD-10-CM    1. Dysfunction of both eustachian tubes  H69.83 Adult Audiology Referral   2. Conductive hearing loss, bilateral  H90.0 Adult Audiology Referral   3. History of placement of ear tubes  Z96.22 Adult Audiology Referral   4. History of allergic rhinitis  Z87.09 Adult Audiology Referral      It was my pleasure seeing Obdulia Black today in clinic.  Patient continues to have symptoms of eustachian  tube dysfunction.  She has significant negative pressure in both ears on examination and audiometric assessment.  Fortunately, she does not have any severe retraction or retraction pockets.  Symptoms are definitely worse when she flies in an airplane.    We discussed conservative measures to manage eustachian tube dysfunction including the trial of Otovent in combination with her current allergy treatments.  We discussed using an oral decongestant, nasal decongestant, filtered earplugs, and chewing gum during flight.    We discussed procedural management of eustachian tube dysfunction including bilateral ear tube placement, eustachian tube dilation, or both.    I will reach out to one of our rhinologist at [facility] to see if he does eustachian tube dilation awake in the clinic.  Currently on not set up to do dilation in the clinic and would have to do this in the operating room if I am able to obtain the eustachian tube balloon through [employer].  If the patient wanted to do ear tubes alone, I think we could do this awake in the clinic.    I will send the patient a [employer] message once I talk with Dr. Motta and check with [employer] regarding obtaining a balloon for here at [facility].    Maxx Feliciano MD  Department of Otolaryngology-Head and Neck Surgery  [facility]         Again, thank you for allowing me to participate in the care of your patient.        Sincerely,        Maxx Feliciano MD

## 2022-01-03 NOTE — NURSING NOTE
"Initial /73 (BP Location: Right arm, Patient Position: Chair, Cuff Size: Adult Large)   Pulse 95   Temp 98  F (36.7  C) (Tympanic)   Wt 85.7 kg (189 lb)   BMI 33.48 kg/m   Estimated body mass index is 33.48 kg/m  as calculated from the following:    Height as of 7/14/20: 1.6 m (5' 3\").    Weight as of this encounter: 85.7 kg (189 lb). .    Farrah Sullivan LPN    "

## 2022-01-03 NOTE — PATIENT INSTRUCTIONS
Ear instructions before flying   1) Oral decongestant (Sudafed or Coricidin HBP with high blood pressure) within 6 hours before the flight  2) Afrin (oxymetazoline) 1-2 hours before flight  3) Filtered ear plugs (EarPlanes) on take of and landing   4) Chew gum on take of and landing     Possible ear tubes, possible Eustachian tube dilation, or both (awake or asleep)    Can try Otovent

## 2022-01-03 NOTE — PROGRESS NOTES
AUDIOLOGY REPORT:    Patient was referred from ENT by Dr. Feliciano for audiology evaluation. The patient reports pain and a plugged sensation in the left ear. 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 She also notes dizziness. The patient reports decreased hearing in the left ear. She was last tested at this clinic on 8/17/2021 and results showed normal hearing sensitivity bilaterally.    Testing:    Otoscopy:   Otoscopic exam indicates ears are clear of cerumen bilaterally     Tympanograms:    RIGHT: negative pressure      LEFT:   borderline negative pressure     Reflexes (reported by stimulus ear):  RIGHT: Ipsilateral is absent at frequencies tested  RIGHT: Contralateral is present at normal levels  LEFT:   Ipsilateral is present at normal levels  LEFT:   Contralateral is absent at frequencies tested    Thresholds:   Pure Tone Thresholds assessed using conventional audiometry with good reliability from 250-8000 Hz bilaterally using insert earphones and circumaural headphones     RIGHT:  mild essentially conductive hearing loss at 250-500 Hz rising to normal hearing sensitivity    LEFT:    mild essentially conductive hearing loss at 250-500 Hz rising to normal hearing sensitivity    Speech Reception Threshold:    RIGHT: 15 dB HL    LEFT:   20 dB HL  Results are in agreement with pure tone average for the left ear and not for the right ear, likely due to low frequency hearing loss.     Word Recognition Score:     RIGHT: 100% at 60 dB HL using NU-6 recorded word list.    LEFT:   100% at 60 dB HL using NU-6 recorded word list.    Discussed results with the patient.     Patient was returned to ENT for follow up.     Job Gonzales, CCC-A  Licensed Audiologist #[id number]  1/3/2022

## 2022-01-24 DIAGNOSIS — Z11.59 ENCOUNTER FOR SCREENING FOR OTHER VIRAL DISEASES: Primary | ICD-10-CM

## 2022-01-27 ENCOUNTER — OFFICE VISIT (OUTPATIENT)
Dept: FAMILY MEDICINE | Facility: CLINIC | Age: 37
End: 2022-01-27
Payer: COMMERCIAL

## 2022-01-27 VITALS
RESPIRATION RATE: 14 BRPM | BODY MASS INDEX: 33.49 KG/M2 | HEART RATE: 110 BPM | TEMPERATURE: 97.3 F | WEIGHT: 189 LBS | SYSTOLIC BLOOD PRESSURE: 120 MMHG | OXYGEN SATURATION: 99 % | DIASTOLIC BLOOD PRESSURE: 62 MMHG | HEIGHT: 63 IN

## 2022-01-27 DIAGNOSIS — Z01.818 PRE-OP EVALUATION: ICD-10-CM

## 2022-01-27 DIAGNOSIS — H69.93 DYSFUNCTION OF BOTH EUSTACHIAN TUBES: Primary | ICD-10-CM

## 2022-01-27 LAB
ANION GAP SERPL CALCULATED.3IONS-SCNC: 4 MMOL/L (ref 3–14)
BASOPHILS # BLD AUTO: 0 10E3/UL (ref 0–0.2)
BASOPHILS NFR BLD AUTO: 0 %
BUN SERPL-MCNC: 13 MG/DL (ref 7–30)
CALCIUM SERPL-MCNC: 9.3 MG/DL (ref 8.5–10.1)
CHLORIDE BLD-SCNC: 106 MMOL/L (ref 94–109)
CO2 SERPL-SCNC: 27 MMOL/L (ref 20–32)
CREAT SERPL-MCNC: 0.72 MG/DL (ref 0.52–1.04)
EOSINOPHIL # BLD AUTO: 0.3 10E3/UL (ref 0–0.7)
EOSINOPHIL NFR BLD AUTO: 4 %
ERYTHROCYTE [DISTWIDTH] IN BLOOD BY AUTOMATED COUNT: 14 % (ref 10–15)
GFR SERPL CREATININE-BSD FRML MDRD: >90 ML/MIN/1.73M2
GLUCOSE BLD-MCNC: 103 MG/DL (ref 70–99)
HCG UR QL: NEGATIVE
HCT VFR BLD AUTO: 40.1 % (ref 35–47)
HGB BLD-MCNC: 13.5 G/DL (ref 11.7–15.7)
LYMPHOCYTES # BLD AUTO: 2.6 10E3/UL (ref 0.8–5.3)
LYMPHOCYTES NFR BLD AUTO: 31 %
MCH RBC QN AUTO: 28.4 PG (ref 26.5–33)
MCHC RBC AUTO-ENTMCNC: 33.7 G/DL (ref 31.5–36.5)
MCV RBC AUTO: 84 FL (ref 78–100)
MONOCYTES # BLD AUTO: 0.5 10E3/UL (ref 0–1.3)
MONOCYTES NFR BLD AUTO: 6 %
NEUTROPHILS # BLD AUTO: 4.9 10E3/UL (ref 1.6–8.3)
NEUTROPHILS NFR BLD AUTO: 59 %
PLATELET # BLD AUTO: 363 10E3/UL (ref 150–450)
POTASSIUM BLD-SCNC: 4.5 MMOL/L (ref 3.4–5.3)
RBC # BLD AUTO: 4.76 10E6/UL (ref 3.8–5.2)
SODIUM SERPL-SCNC: 137 MMOL/L (ref 133–144)
WBC # BLD AUTO: 8.3 10E3/UL (ref 4–11)

## 2022-01-27 PROCEDURE — 99214 OFFICE O/P EST MOD 30 MIN: CPT | Performed by: NURSE PRACTITIONER

## 2022-01-27 PROCEDURE — 85025 COMPLETE CBC W/AUTO DIFF WBC: CPT | Performed by: NURSE PRACTITIONER

## 2022-01-27 PROCEDURE — 80048 BASIC METABOLIC PNL TOTAL CA: CPT | Performed by: NURSE PRACTITIONER

## 2022-01-27 PROCEDURE — 81025 URINE PREGNANCY TEST: CPT | Performed by: NURSE PRACTITIONER

## 2022-01-27 PROCEDURE — 36415 COLL VENOUS BLD VENIPUNCTURE: CPT | Performed by: NURSE PRACTITIONER

## 2022-01-27 ASSESSMENT — PAIN SCALES - GENERAL: PAINLEVEL: MILD PAIN (2)

## 2022-01-27 ASSESSMENT — MIFFLIN-ST. JEOR: SCORE: 1506.43

## 2022-01-27 NOTE — H&P (VIEW-ONLY)
[facility]  [address] [place] [address]  Phone: [phone]  Fax: [phone]  Primary Provider: June Hodges  Pre-op Performing Provider: JUNE HODGES      PREOPERATIVE EVALUATION:  Today's date: 1/27/2022    Obdulia Black is a 36 year old female who presents for a preoperative evaluation.    Surgical Information:  Surgery/Procedure: Myringotomy   Surgery Location: [place]   Surgeon: Jodie  Surgery Date: 02/03/2022  Time of Surgery: TBd   Where patient plans to recover: At home with family  Fax number for surgical facility: Note does not need to be faxed, will be available electronically in Epic.    Type of Anesthesia Anticipated: General    Assessment & Plan     The proposed surgical procedure is considered LOW risk.    Dysfunction of both eustachian tubes  - CBC with platelets and differential  - Basic metabolic panel  (Ca, Cl, CO2, Creat, Gluc, K, Na, BUN)  - HCG Qual, Urine ([id number])      Pre-op evaluation  - CBC with platelets and differential  - Basic metabolic panel  (Ca, Cl, CO2, Creat, Gluc, K, Na, BUN)  - HCG Qual, Urine ([id number])    Risks and Recommendations:  The patient has the following additional risks and recommendations for perioperative complications:   - No identified additional risk factors other than previously addressed    Medication Instructions:  Patient is to take all scheduled medications on the day of surgery   - SSRIs, SNRIs, TCAs, Antipsychotics: Continue without modification.     RECOMMENDATION:  APPROVAL GIVEN to proceed with proposed procedure, without further diagnostic evaluation.      Call or return to the clinic with any worsening of symptoms or no resolution. Patient/Parent verbalized understanding and is in agreement. Medication side effects reviewed.   Current Outpatient Medications   Medication Sig Dispense Refill     buPROPion (WELLBUTRIN XL) 300 MG 24 hr tablet Take 300 mg by mouth every morning        cetirizine (ZYRTEC) 10 MG tablet Take 10 mg by mouth daily PRN       FLUoxetine (PROZAC) 40 MG capsule Take 40 mg by mouth daily       fluticasone (FLONASE) 50 MCG/ACT nasal spray Spray 2 sprays in nostril daily 1 Package 11     naltrexone (DEPADE/REVIA) 50 MG tablet Take 50 mg by mouth daily       QUEtiapine (SEROQUEL) 25 MG tablet Take 25 mg by mouth At Bedtime Takes 1-2 tablets as needed       vitamin D3 (CHOLECALCIFEROL) 50 mcg (2000 units) tablet Take 1 tablet by mouth daily Takes 2 tablets daily       Chart documentation with Dragon Voice recognition Software. Although reviewed after completion, some words and grammatical errors may remain.  Liane Hodges MSN,[facility]-[address] [address] [phone]          Subjective     HPI related to upcoming procedure: ear issues her entire life  TM ruptures multiple. Not able to hear well. Ears feel full all the time  Env allergies contribute to ETD and is planning to consult with allergy team again soon to discuss other interventions.       Preop Questions 1/27/2022   1. Have you ever had a heart attack or stroke? No   2. Have you ever had surgery on your heart or blood vessels, such as a stent placement, a coronary artery bypass, or surgery on an artery in your head, neck, heart, or legs? No   3. Do you have chest pain with activity? No   4. Do you have a history of  heart failure? No   5. Do you currently have a cold, bronchitis or symptoms of other infection? No   6. Do you have a cough, shortness of breath, or wheezing? No   7. Do you or anyone in your family have previous history of blood clots? 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 Do you or does anyone in your family have a serious bleeding problem such as prolonged bleeding following surgeries or cuts? No   9. Have you ever had problems with anemia or been told to take iron pills? No   10. Have you had any abnormal blood loss such as black,  tarry or bloody stools, or abnormal vaginal bleeding? No   11. Have you ever had a blood transfusion? No   12. Are you willing to have a blood transfusion if it is medically needed before, during, or after your surgery? Yes   13. Have you or any of your relatives ever had problems with anesthesia? No   14. Do you have sleep apnea, excessive snoring or daytime drowsiness? No   15. Do you have any artifical heart valves or other implanted medical devices like a pacemaker, defibrillator, or continuous glucose monitor? No   16. Do you have artificial joints? No   17. Are you allergic to latex? No   18. Is there any chance that you may be pregnant? No       Health Care Directive:  Patient does not have a Health Care Directive or Living Will: Discussed advance care planning with patient; however, patient declined at this time.    Preoperative Review of :   reviewed - no record of controlled substances prescribed.        Review of Systems  Constitutional, neuro, ENT, endocrine, pulmonary, cardiac, gastrointestinal, genitourinary, musculoskeletal, integument and psychiatric systems are negative, except as otherwise noted.  Allergy symptoms ongoing using claritin and flonase.     Patient Active Problem List    Diagnosis Date Noted     Normal pregnancy 08/06/2016     Priority: Medium     Vaginal delivery 08/06/2016     Priority: Medium     Numbness and tingling of both upper extremities while sleeping 04/28/2016     Priority: Medium     Prenatal care, first pregnancy 12/22/2015     Priority: Medium     FOB (): Paul  Normal [facility]       Tobacco use disorder 11/25/2013     Priority: Medium     Allergic rhinitis 11/25/2013     Priority: Medium     CARDIOVASCULAR SCREENING; LDL GOAL LESS THAN 160 10/31/2010     Priority: Medium     Esophageal reflux 08/22/2006     Priority: Medium      Past Medical History:   Diagnosis Date     Chickenpox      Depression      Depressive disorder 1999     Moderate persistent asthma       Past Surgical History:   Procedure Laterality Date     BIOPSY  2002     MOUTH SURGERY      wisdom teeth x 4     PE TUBES  3/02/92     SURGICAL HISTORY OF -   03    Excision of cyst on left middle finger     TONSILLECTOMY & ADENOIDECTOMY  1991     Current Outpatient Medications   Medication Sig Dispense Refill     buPROPion (WELLBUTRIN XL) 300 MG 24 hr tablet Take 300 mg by mouth every morning       cetirizine (ZYRTEC) 10 MG tablet Take 10 mg by mouth daily PRN       FLUoxetine (PROZAC) 40 MG capsule Take 40 mg by mouth daily       fluticasone (FLONASE) 50 MCG/ACT nasal spray Spray 2 sprays in nostril daily 1 Package 11     naltrexone (DEPADE/REVIA) 50 MG tablet Take 50 mg by mouth daily       QUEtiapine (SEROQUEL) 25 MG tablet Take 25 mg by mouth At Bedtime Takes 1-2 tablets as needed       vitamin D3 (CHOLECALCIFEROL) 50 mcg (2000 units) tablet Take 1 tablet by mouth daily Takes 2 tablets daily         Allergies   Allergen Reactions     Sulfa Drugs Itching and Swelling        Social History     Tobacco Use     Smoking status: Former Smoker     Packs/day: 0.00     Years: 10.00     Pack years: 0.00     Types: Cigarettes     Quit date: 2008     Years since quittin.6     Smokeless tobacco: Never Used     Tobacco comment: 0   Substance Use Topics     Alcohol use: Yes     Alcohol/week: 0.0 standard drinks     Comment: rare     Family History   Problem Relation Age of Onset     Hypertension Maternal Grandmother      Other Cancer Maternal Grandmother         leukemia     Gastrointestinal Disease Maternal Grandmother         diverticulitis     Thyroid Disease Maternal Grandmother      Diabetes Maternal Grandfather      Cerebrovascular Disease Maternal Grandfather      Heart Disease Maternal Grandfather         MI- open heart surgery     Eye Disorder Maternal Grandfather      Depression Maternal Grandfather      Cancer Paternal Grandmother         leukemia     Prostate Cancer Paternal Grandfather   "    Alzheimer Disease Paternal Grandfather      Substance Abuse Paternal Grandfather         alcohol     Anemia Mother      Depression Mother      Anxiety Disorder Mother      Rheumatoid Arthritis Father      Diabetes Maternal Uncle      Substance Abuse Brother         prescription pills     Asthma Sister         Half sister     Lupus Other      History   Drug Use Unknown     Comment: last use 2019         Objective     /62   Pulse 110   Temp 97.3  F (36.3  C) (Tympanic)   Resp 14   Ht 1.6 m (5' 3\")   Wt 85.7 kg (189 lb)   LMP 01/03/2022   SpO2 99%   BMI 33.48 kg/m      Physical Exam    GENERAL APPEARANCE: healthy, alert and no distress     EYES: EOMI, PERRL     HENT: nose and mouth without ulcers or lesions and TM fluid bilateral     NECK: no adenopathy, no asymmetry, masses, or scars and thyroid normal to palpation     RESP: lungs clear to auscultation - no rales, rhonchi or wheezes     CV: regular rates and rhythm, normal S1 S2, no S3 or S4 and no murmur, click or rub     ABDOMEN:  soft, nontender, no HSM or masses and bowel sounds normal     MS: extremities normal- no gross deformities noted, no evidence of inflammation in joints, FROM in all extremities.     SKIN: no suspicious lesions or rashes     NEURO: Normal strength and tone, sensory exam grossly normal, mentation intact and speech normal     PSYCH: mentation appears normal. and affect normal/bright     LYMPHATICS: No cervical adenopathy    No results for input(s): HGB, PLT, INR, NA, POTASSIUM, CR, A1C in the last 07141 hours.     Diagnostics:  Labs pending at this time.  Results will be reviewed when available.   No EKG required, no history of coronary heart disease, significant arrhythmia, peripheral arterial disease or other structural heart disease.    Revised Cardiac Risk Index (RCRI):  The patient has the following serious cardiovascular risks for perioperative complications:   - No serious cardiac risks = 0 points     RCRI " Interpretation: 0 points: Class I (very low risk - 0.4% complication rate)           Signed Electronically by: NICOLA Pittman CNP  Copy of this evaluation report is provided to requesting physician.

## 2022-01-27 NOTE — PROGRESS NOTES
[facility]  [address] [place] [address]  Phone: [phone]  Fax: [phone]  Primary Provider: June Hodges  Pre-op Performing Provider: JUNE HODGES      PREOPERATIVE EVALUATION:  Today's date: 1/27/2022    Obdulia Black is a 36 year old female who presents for a preoperative evaluation.    Surgical Information:  Surgery/Procedure: Myringotomy   Surgery Location: [place]   Surgeon: Jodie  Surgery Date: 02/03/2022  Time of Surgery: TBd   Where patient plans to recover: At home with family  Fax number for surgical facility: Note does not need to be faxed, will be available electronically in Epic.    Type of Anesthesia Anticipated: General    Assessment & Plan     The proposed surgical procedure is considered LOW risk.    Dysfunction of both eustachian tubes  - CBC with platelets and differential  - Basic metabolic panel  (Ca, Cl, CO2, Creat, Gluc, K, Na, BUN)  - HCG Qual, Urine ([id number])      Pre-op evaluation  - CBC with platelets and differential  - Basic metabolic panel  (Ca, Cl, CO2, Creat, Gluc, K, Na, BUN)  - HCG Qual, Urine ([id number])    Risks and Recommendations:  The patient has the following additional risks and recommendations for perioperative complications:   - No identified additional risk factors other than previously addressed    Medication Instructions:  Patient is to take all scheduled medications on the day of surgery   - SSRIs, SNRIs, TCAs, Antipsychotics: Continue without modification.     RECOMMENDATION:  APPROVAL GIVEN to proceed with proposed procedure, without further diagnostic evaluation.      Call or return to the clinic with any worsening of symptoms or no resolution. Patient/Parent verbalized understanding and is in agreement. Medication side effects reviewed.   Current Outpatient Medications   Medication Sig Dispense Refill     buPROPion (WELLBUTRIN XL) 300 MG 24 hr tablet Take 300 mg by mouth every morning        cetirizine (ZYRTEC) 10 MG tablet Take 10 mg by mouth daily PRN       FLUoxetine (PROZAC) 40 MG capsule Take 40 mg by mouth daily       fluticasone (FLONASE) 50 MCG/ACT nasal spray Spray 2 sprays in nostril daily 1 Package 11     naltrexone (DEPADE/REVIA) 50 MG tablet Take 50 mg by mouth daily       QUEtiapine (SEROQUEL) 25 MG tablet Take 25 mg by mouth At Bedtime Takes 1-2 tablets as needed       vitamin D3 (CHOLECALCIFEROL) 50 mcg (2000 units) tablet Take 1 tablet by mouth daily Takes 2 tablets daily       Chart documentation with Dragon Voice recognition Software. Although reviewed after completion, some words and grammatical errors may remain.  Liane Hodges MSN,[facility]-[address] [address] [phone]          Subjective     HPI related to upcoming procedure: ear issues her entire life  TM ruptures multiple. Not able to hear well. Ears feel full all the time  Env allergies contribute to ETD and is planning to consult with allergy team again soon to discuss other interventions.       Preop Questions 1/27/2022   1. Have you ever had a heart attack or stroke? No   2. Have you ever had surgery on your heart or blood vessels, such as a stent placement, a coronary artery bypass, or surgery on an artery in your head, neck, heart, or legs? No   3. Do you have chest pain with activity? No   4. Do you have a history of  heart failure? No   5. Do you currently have a cold, bronchitis or symptoms of other infection? No   6. Do you have a cough, shortness of breath, or wheezing? No   7. Do you or anyone in your family have previous history of blood clots? 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 Do you or does anyone in your family have a serious bleeding problem such as prolonged bleeding following surgeries or cuts? No   9. Have you ever had problems with anemia or been told to take iron pills? No   10. Have you had any abnormal blood loss such as black,  tarry or bloody stools, or abnormal vaginal bleeding? No   11. Have you ever had a blood transfusion? No   12. Are you willing to have a blood transfusion if it is medically needed before, during, or after your surgery? Yes   13. Have you or any of your relatives ever had problems with anesthesia? No   14. Do you have sleep apnea, excessive snoring or daytime drowsiness? No   15. Do you have any artifical heart valves or other implanted medical devices like a pacemaker, defibrillator, or continuous glucose monitor? No   16. Do you have artificial joints? No   17. Are you allergic to latex? No   18. Is there any chance that you may be pregnant? No       Health Care Directive:  Patient does not have a Health Care Directive or Living Will: Discussed advance care planning with patient; however, patient declined at this time.    Preoperative Review of :   reviewed - no record of controlled substances prescribed.        Review of Systems  Constitutional, neuro, ENT, endocrine, pulmonary, cardiac, gastrointestinal, genitourinary, musculoskeletal, integument and psychiatric systems are negative, except as otherwise noted.  Allergy symptoms ongoing using claritin and flonase.     Patient Active Problem List    Diagnosis Date Noted     Normal pregnancy 08/06/2016     Priority: Medium     Vaginal delivery 08/06/2016     Priority: Medium     Numbness and tingling of both upper extremities while sleeping 04/28/2016     Priority: Medium     Prenatal care, first pregnancy 12/22/2015     Priority: Medium     FOB (): Paul  Normal [facility]       Tobacco use disorder 11/25/2013     Priority: Medium     Allergic rhinitis 11/25/2013     Priority: Medium     CARDIOVASCULAR SCREENING; LDL GOAL LESS THAN 160 10/31/2010     Priority: Medium     Esophageal reflux 08/22/2006     Priority: Medium      Past Medical History:   Diagnosis Date     Chickenpox      Depression      Depressive disorder 1999     Moderate persistent asthma       Past Surgical History:   Procedure Laterality Date     BIOPSY  2002     MOUTH SURGERY      wisdom teeth x 4     PE TUBES  3/02/92     SURGICAL HISTORY OF -   03    Excision of cyst on left middle finger     TONSILLECTOMY & ADENOIDECTOMY  1991     Current Outpatient Medications   Medication Sig Dispense Refill     buPROPion (WELLBUTRIN XL) 300 MG 24 hr tablet Take 300 mg by mouth every morning       cetirizine (ZYRTEC) 10 MG tablet Take 10 mg by mouth daily PRN       FLUoxetine (PROZAC) 40 MG capsule Take 40 mg by mouth daily       fluticasone (FLONASE) 50 MCG/ACT nasal spray Spray 2 sprays in nostril daily 1 Package 11     naltrexone (DEPADE/REVIA) 50 MG tablet Take 50 mg by mouth daily       QUEtiapine (SEROQUEL) 25 MG tablet Take 25 mg by mouth At Bedtime Takes 1-2 tablets as needed       vitamin D3 (CHOLECALCIFEROL) 50 mcg (2000 units) tablet Take 1 tablet by mouth daily Takes 2 tablets daily         Allergies   Allergen Reactions     Sulfa Drugs Itching and Swelling        Social History     Tobacco Use     Smoking status: Former Smoker     Packs/day: 0.00     Years: 10.00     Pack years: 0.00     Types: Cigarettes     Quit date: 2008     Years since quittin.6     Smokeless tobacco: Never Used     Tobacco comment: 0   Substance Use Topics     Alcohol use: Yes     Alcohol/week: 0.0 standard drinks     Comment: rare     Family History   Problem Relation Age of Onset     Hypertension Maternal Grandmother      Other Cancer Maternal Grandmother         leukemia     Gastrointestinal Disease Maternal Grandmother         diverticulitis     Thyroid Disease Maternal Grandmother      Diabetes Maternal Grandfather      Cerebrovascular Disease Maternal Grandfather      Heart Disease Maternal Grandfather         MI- open heart surgery     Eye Disorder Maternal Grandfather      Depression Maternal Grandfather      Cancer Paternal Grandmother         leukemia     Prostate Cancer Paternal Grandfather   "    Alzheimer Disease Paternal Grandfather      Substance Abuse Paternal Grandfather         alcohol     Anemia Mother      Depression Mother      Anxiety Disorder Mother      Rheumatoid Arthritis Father      Diabetes Maternal Uncle      Substance Abuse Brother         prescription pills     Asthma Sister         Half sister     Lupus Other      History   Drug Use Unknown     Comment: last use 2019         Objective     /62   Pulse 110   Temp 97.3  F (36.3  C) (Tympanic)   Resp 14   Ht 1.6 m (5' 3\")   Wt 85.7 kg (189 lb)   LMP 01/03/2022   SpO2 99%   BMI 33.48 kg/m      Physical Exam    GENERAL APPEARANCE: healthy, alert and no distress     EYES: EOMI, PERRL     HENT: nose and mouth without ulcers or lesions and TM fluid bilateral     NECK: no adenopathy, no asymmetry, masses, or scars and thyroid normal to palpation     RESP: lungs clear to auscultation - no rales, rhonchi or wheezes     CV: regular rates and rhythm, normal S1 S2, no S3 or S4 and no murmur, click or rub     ABDOMEN:  soft, nontender, no HSM or masses and bowel sounds normal     MS: extremities normal- no gross deformities noted, no evidence of inflammation in joints, FROM in all extremities.     SKIN: no suspicious lesions or rashes     NEURO: Normal strength and tone, sensory exam grossly normal, mentation intact and speech normal     PSYCH: mentation appears normal. and affect normal/bright     LYMPHATICS: No cervical adenopathy    No results for input(s): HGB, PLT, INR, NA, POTASSIUM, CR, A1C in the last 42068 hours.     Diagnostics:  Labs pending at this time.  Results will be reviewed when available.   No EKG required, no history of coronary heart disease, significant arrhythmia, peripheral arterial disease or other structural heart disease.    Revised Cardiac Risk Index (RCRI):  The patient has the following serious cardiovascular risks for perioperative complications:   - No serious cardiac risks = 0 points     RCRI " Interpretation: 0 points: Class I (very low risk - 0.4% complication rate)           Signed Electronically by: NICOLA Pittman CNP  Copy of this evaluation report is provided to requesting physician.

## 2022-01-31 ENCOUNTER — ALLIED HEALTH/NURSE VISIT (OUTPATIENT)
Dept: UROLOGY | Facility: CLINIC | Age: 37
End: 2022-01-31
Payer: COMMERCIAL

## 2022-01-31 DIAGNOSIS — Z11.59 ENCOUNTER FOR SCREENING FOR OTHER VIRAL DISEASES: ICD-10-CM

## 2022-01-31 PROCEDURE — 99207 PR NO CHARGE NURSE ONLY: CPT

## 2022-01-31 PROCEDURE — U0005 INFEC AGEN DETEC AMPLI PROBE: HCPCS

## 2022-01-31 PROCEDURE — U0003 INFECTIOUS AGENT DETECTION BY NUCLEIC ACID (DNA OR RNA); SEVERE ACUTE RESPIRATORY SYNDROME CORONAVIRUS 2 (SARS-COV-2) (CORONAVIRUS DISEASE [COVID-19]), AMPLIFIED PROBE TECHNIQUE, MAKING USE OF HIGH THROUGHPUT TECHNOLOGIES AS DESCRIBED BY CMS-2020-01-R: HCPCS

## 2022-02-01 LAB — SARS-COV-2 RNA RESP QL NAA+PROBE: NEGATIVE

## 2022-02-02 ENCOUNTER — ANESTHESIA EVENT (OUTPATIENT)
Dept: SURGERY | Facility: CLINIC | Age: 37
End: 2022-02-02
Payer: COMMERCIAL

## 2022-02-02 ASSESSMENT — LIFESTYLE VARIABLES: TOBACCO_USE: 1

## 2022-02-02 NOTE — ANESTHESIA PREPROCEDURE EVALUATION
Anesthesia Pre-Procedure Evaluation    Patient: Obdulia Black   MRN: [id number] : 1985        Preoperative Diagnosis: Dysfunction of both eustachian tubes [H69.83]  Conductive hearing loss, bilateral [H90.0]  History of placement of ear tubes [Z96.22]  History of allergic rhinitis [Z87.09]    Procedure : Procedure(s):  MYRINGOTOMY, BILATERAL, WITH VENTILATION TUBE INSERTION  ENDOSCOPIC EUSTACHAIN TUBE BALLOON DILATION          Past Medical History:   Diagnosis Date     Chickenpox      Depression      Depressive disorder      Moderate persistent asthma       Past Surgical History:   Procedure Laterality Date     BIOPSY  2002     MOUTH SURGERY      wisdom teeth x 4     PE TUBES  3/02/92     SURGICAL HISTORY OF -   03    Excision of cyst on left middle finger     TONSILLECTOMY & ADENOIDECTOMY  1991      Allergies   Allergen Reactions     Sulfa Drugs Itching and Swelling      Social History     Tobacco Use     Smoking status: Former Smoker     Packs/day: 0.00     Years: 10.00     Pack years: 0.00     Types: Cigarettes     Quit date: 2008     Years since quittin.6     Smokeless tobacco: Never Used     Tobacco comment: 0   Substance Use Topics     Alcohol use: Yes     Alcohol/week: 0.0 standard drinks     Comment: rare      Wt Readings from Last 1 Encounters:   22 85.7 kg (189 lb)        Anesthesia Evaluation   Pt has had prior anesthetic. Type: General.        ROS/MED HX  ENT/Pulmonary:     (+) tobacco use, Past use, Moderate Persistent, asthma     Neurologic:       Cardiovascular:       METS/Exercise Tolerance:     Hematologic:       Musculoskeletal:       GI/Hepatic:     (+) GERD, Asymptomatic on medication,     Renal/Genitourinary:       Endo:       Psychiatric/Substance Use:     (+) psychiatric history depression     Infectious Disease:       Malignancy:       Other:            Physical Exam    Airway  airway exam normal           Respiratory Devices and Support         Dental   no notable dental history         Cardiovascular   cardiovascular exam normal          Pulmonary   pulmonary exam normal                OUTSIDE LABS:  CBC:   Lab Results   Component Value Date    WBC 8.3 01/27/2022    WBC 13.4 (H) 01/05/2016    HGB 13.5 01/27/2022    HGB 11.1 (L) 04/28/2016    HCT 40.1 01/27/2022    HCT 37.9 01/05/2016     01/27/2022     01/05/2016     BMP:   Lab Results   Component Value Date     01/27/2022    POTASSIUM 4.5 01/27/2022    CHLORIDE 106 01/27/2022    CO2 27 01/27/2022    BUN 13 01/27/2022    CR 0.72 01/27/2022     (H) 01/27/2022     COAGS: No results found for: PTT, INR, FIBR  POC:   Lab Results   Component Value Date    HCG Negative 01/27/2022     HEPATIC: No results found for: ALBUMIN, PROTTOTAL, ALT, AST, GGT, ALKPHOS, BILITOTAL, BILIDIRECT, JUAN MANUEL  OTHER:   Lab Results   Component Value Date    ROBBI 9.3 01/27/2022    CRP 3.8 07/14/2020    SED 7 07/14/2020       Anesthesia Plan    ASA Status:  2   NPO Status:  NPO Appropriate    Anesthesia Type: General.     - Airway: LMA   Induction: Intravenous, Propofol.   Maintenance: TIVA.        Consents    Anesthesia Plan(s) and associated risks, benefits, and realistic alternatives discussed. Questions answered and patient/representative(s) expressed understanding.     - Discussed: Risks, Benefits and Alternatives for BOTH SEDATION and the PROCEDURE were discussed     - Discussed with:  Patient, Parent (Mother and/or Father)      - Extended Intubation/Ventilatory Support Discussed: No.      - Patient is DNR/DNI Status: No    Use of blood products discussed: No .     Postoperative Care    Pain management: Multi-modal analgesia.   PONV prophylaxis: Ondansetron (or other 5HT-3), Dexamethasone or Solumedrol     Comments:                Jaime Hernandez CRNA, APRN DANNA

## 2022-02-03 ENCOUNTER — HOSPITAL ENCOUNTER (OUTPATIENT)
Facility: CLINIC | Age: 37
Discharge: HOME | End: 2022-02-03
Attending: OTOLARYNGOLOGY | Admitting: OTOLARYNGOLOGY
Payer: COMMERCIAL

## 2022-02-03 ENCOUNTER — ANESTHESIA (OUTPATIENT)
Dept: SURGERY | Facility: CLINIC | Age: 37
End: 2022-02-03
Payer: COMMERCIAL

## 2022-02-03 VITALS
HEART RATE: 80 BPM | WEIGHT: 189 LBS | OXYGEN SATURATION: 99 % | TEMPERATURE: 98 F | RESPIRATION RATE: 21 BRPM | DIASTOLIC BLOOD PRESSURE: 80 MMHG | HEIGHT: 63 IN | BODY MASS INDEX: 33.49 KG/M2 | SYSTOLIC BLOOD PRESSURE: 113 MMHG

## 2022-02-03 DIAGNOSIS — H69.93 DYSFUNCTION OF BOTH EUSTACHIAN TUBES: Primary | ICD-10-CM

## 2022-02-03 PROCEDURE — 250N000011 HC RX IP 250 OP 636: Performed by: OTOLARYNGOLOGY

## 2022-02-03 PROCEDURE — 69706 NPS SURG DILAT EUST TUBE BI: CPT | Performed by: OTOLARYNGOLOGY

## 2022-02-03 PROCEDURE — 999N000141 HC STATISTIC PRE-PROCEDURE NURSING ASSESSMENT: Performed by: OTOLARYNGOLOGY

## 2022-02-03 PROCEDURE — 370N000017 HC ANESTHESIA TECHNICAL FEE, PER MIN: Performed by: OTOLARYNGOLOGY

## 2022-02-03 PROCEDURE — 250N000011 HC RX IP 250 OP 636: Performed by: NURSE ANESTHETIST, CERTIFIED REGISTERED

## 2022-02-03 PROCEDURE — 272N000001 HC OR GENERAL SUPPLY STERILE: Performed by: OTOLARYNGOLOGY

## 2022-02-03 PROCEDURE — 250N000025 HC SEVOFLURANE, PER MIN: Performed by: OTOLARYNGOLOGY

## 2022-02-03 PROCEDURE — 69436 CREATE EARDRUM OPENING: CPT | Mod: 50 | Performed by: OTOLARYNGOLOGY

## 2022-02-03 PROCEDURE — 258N000003 HC RX IP 258 OP 636: Performed by: NURSE ANESTHETIST, CERTIFIED REGISTERED

## 2022-02-03 PROCEDURE — 250N000013 HC RX MED GY IP 250 OP 250 PS 637: Performed by: OTOLARYNGOLOGY

## 2022-02-03 PROCEDURE — 250N000009 HC RX 250: Performed by: NURSE ANESTHETIST, CERTIFIED REGISTERED

## 2022-02-03 PROCEDURE — 710N000009 HC RECOVERY PHASE 1, LEVEL 1, PER MIN: Performed by: OTOLARYNGOLOGY

## 2022-02-03 PROCEDURE — 710N000012 HC RECOVERY PHASE 2, PER MINUTE: Performed by: OTOLARYNGOLOGY

## 2022-02-03 PROCEDURE — 360N000076 HC SURGERY LEVEL 3, PER MIN: Performed by: OTOLARYNGOLOGY

## 2022-02-03 PROCEDURE — C1889 IMPLANT/INSERT DEVICE, NOC: HCPCS

## 2022-02-03 PROCEDURE — 250N000009 HC RX 250: Performed by: OTOLARYNGOLOGY

## 2022-02-03 PROCEDURE — C1889 IMPLANT/INSERT DEVICE, NOC: HCPCS | Performed by: OTOLARYNGOLOGY

## 2022-02-03 RX ORDER — FENTANYL CITRATE 50 UG/ML
50 INJECTION, SOLUTION INTRAMUSCULAR; INTRAVENOUS EVERY 5 MIN PRN
Status: CANCELLED | OUTPATIENT
Start: 2022-02-03

## 2022-02-03 RX ORDER — LIDOCAINE 40 MG/G
CREAM TOPICAL
Status: DISCONTINUED | OUTPATIENT
Start: 2022-02-03 | End: 2022-02-03 | Stop reason: HOSPADM

## 2022-02-03 RX ORDER — IBUPROFEN 200 MG
200-400 TABLET ORAL ONCE
Status: DISCONTINUED | OUTPATIENT
Start: 2022-02-03 | End: 2022-02-03 | Stop reason: HOSPADM

## 2022-02-03 RX ORDER — ONDANSETRON 2 MG/ML
4 INJECTION INTRAMUSCULAR; INTRAVENOUS EVERY 30 MIN PRN
Status: DISCONTINUED | OUTPATIENT
Start: 2022-02-03 | End: 2022-02-03 | Stop reason: HOSPADM

## 2022-02-03 RX ORDER — OXYMETAZOLINE HYDROCHLORIDE 0.05 G/100ML
SPRAY NASAL PRN
Status: DISCONTINUED | OUTPATIENT
Start: 2022-02-03 | End: 2022-02-03 | Stop reason: HOSPADM

## 2022-02-03 RX ORDER — PROPOFOL 10 MG/ML
INJECTION, EMULSION INTRAVENOUS CONTINUOUS PRN
Status: DISCONTINUED | OUTPATIENT
Start: 2022-02-03 | End: 2022-02-03

## 2022-02-03 RX ORDER — HYDROXYZINE HYDROCHLORIDE 50 MG/1
50 TABLET, FILM COATED ORAL EVERY 6 HOURS PRN
Status: CANCELLED | OUTPATIENT
Start: 2022-02-03

## 2022-02-03 RX ORDER — ONDANSETRON 4 MG/1
4 TABLET, ORALLY DISINTEGRATING ORAL ONCE
Status: COMPLETED | OUTPATIENT
Start: 2022-02-03 | End: 2022-02-03

## 2022-02-03 RX ORDER — FENTANYL CITRATE 50 UG/ML
50 INJECTION, SOLUTION INTRAMUSCULAR; INTRAVENOUS EVERY 5 MIN PRN
Status: DISCONTINUED | OUTPATIENT
Start: 2022-02-03 | End: 2022-02-03 | Stop reason: HOSPADM

## 2022-02-03 RX ORDER — OFLOXACIN 3 MG/ML
SOLUTION OPHTHALMIC PRN
Status: DISCONTINUED | OUTPATIENT
Start: 2022-02-03 | End: 2022-02-03 | Stop reason: HOSPADM

## 2022-02-03 RX ORDER — DEXAMETHASONE SODIUM PHOSPHATE 4 MG/ML
INJECTION, SOLUTION INTRA-ARTICULAR; INTRALESIONAL; INTRAMUSCULAR; INTRAVENOUS; SOFT TISSUE PRN
Status: DISCONTINUED | OUTPATIENT
Start: 2022-02-03 | End: 2022-02-03

## 2022-02-03 RX ORDER — OXYCODONE HYDROCHLORIDE 5 MG/1
5 TABLET ORAL EVERY 4 HOURS PRN
Status: DISCONTINUED | OUTPATIENT
Start: 2022-02-03 | End: 2022-02-03 | Stop reason: HOSPADM

## 2022-02-03 RX ORDER — ACETAMINOPHEN 325 MG/1
975 TABLET ORAL ONCE
Status: COMPLETED | OUTPATIENT
Start: 2022-02-03 | End: 2022-02-03

## 2022-02-03 RX ORDER — FENTANYL CITRATE 50 UG/ML
25 INJECTION, SOLUTION INTRAMUSCULAR; INTRAVENOUS
Status: DISCONTINUED | OUTPATIENT
Start: 2022-02-03 | End: 2022-02-03 | Stop reason: HOSPADM

## 2022-02-03 RX ORDER — SODIUM CHLORIDE, SODIUM LACTATE, POTASSIUM CHLORIDE, CALCIUM CHLORIDE 600; 310; 30; 20 MG/100ML; MG/100ML; MG/100ML; MG/100ML
INJECTION, SOLUTION INTRAVENOUS CONTINUOUS
Status: CANCELLED | OUTPATIENT
Start: 2022-02-03

## 2022-02-03 RX ORDER — LIDOCAINE HYDROCHLORIDE 10 MG/ML
INJECTION, SOLUTION INFILTRATION; PERINEURAL PRN
Status: DISCONTINUED | OUTPATIENT
Start: 2022-02-03 | End: 2022-02-03

## 2022-02-03 RX ORDER — ALBUTEROL SULFATE 0.83 MG/ML
2.5 SOLUTION RESPIRATORY (INHALATION)
Status: DISCONTINUED | OUTPATIENT
Start: 2022-02-03 | End: 2022-02-03 | Stop reason: HOSPADM

## 2022-02-03 RX ORDER — SODIUM CHLORIDE, SODIUM LACTATE, POTASSIUM CHLORIDE, CALCIUM CHLORIDE 600; 310; 30; 20 MG/100ML; MG/100ML; MG/100ML; MG/100ML
INJECTION, SOLUTION INTRAVENOUS CONTINUOUS
Status: DISCONTINUED | OUTPATIENT
Start: 2022-02-03 | End: 2022-02-03 | Stop reason: HOSPADM

## 2022-02-03 RX ORDER — ONDANSETRON 4 MG/1
4 TABLET, ORALLY DISINTEGRATING ORAL EVERY 30 MIN PRN
Status: DISCONTINUED | OUTPATIENT
Start: 2022-02-03 | End: 2022-02-03 | Stop reason: HOSPADM

## 2022-02-03 RX ORDER — ACETAMINOPHEN 325 MG/1
325-650 TABLET ORAL EVERY 6 HOURS PRN
Qty: 200 TABLET | Refills: 1 | Status: SHIPPED | OUTPATIENT
Start: 2022-02-03 | End: 2022-03-23

## 2022-02-03 RX ORDER — OXYCODONE HYDROCHLORIDE 5 MG/1
5-10 TABLET ORAL ONCE
Status: COMPLETED | OUTPATIENT
Start: 2022-02-03 | End: 2022-02-03

## 2022-02-03 RX ORDER — MEPERIDINE HYDROCHLORIDE 25 MG/ML
12.5 INJECTION INTRAMUSCULAR; INTRAVENOUS; SUBCUTANEOUS
Status: CANCELLED | OUTPATIENT
Start: 2022-02-03

## 2022-02-03 RX ORDER — ACETAMINOPHEN 325 MG/1
975 TABLET ORAL ONCE
Status: DISCONTINUED | OUTPATIENT
Start: 2022-02-03 | End: 2022-02-03

## 2022-02-03 RX ORDER — FENTANYL CITRATE 50 UG/ML
INJECTION, SOLUTION INTRAMUSCULAR; INTRAVENOUS PRN
Status: DISCONTINUED | OUTPATIENT
Start: 2022-02-03 | End: 2022-02-03

## 2022-02-03 RX ORDER — IBUPROFEN 200 MG
400 TABLET ORAL EVERY 6 HOURS PRN
Qty: 200 TABLET | Refills: 1 | Status: SHIPPED | OUTPATIENT
Start: 2022-02-03 | End: 2022-03-23

## 2022-02-03 RX ORDER — KETOROLAC TROMETHAMINE 15 MG/ML
15 INJECTION, SOLUTION INTRAMUSCULAR; INTRAVENOUS ONCE
Status: COMPLETED | OUTPATIENT
Start: 2022-02-03 | End: 2022-02-03

## 2022-02-03 RX ORDER — HYDROXYZINE HYDROCHLORIDE 25 MG/1
25 TABLET, FILM COATED ORAL EVERY 6 HOURS PRN
Status: CANCELLED | OUTPATIENT
Start: 2022-02-03

## 2022-02-03 RX ORDER — HYDROXYZINE HYDROCHLORIDE 25 MG/1
25 TABLET, FILM COATED ORAL EVERY 6 HOURS PRN
Status: DISCONTINUED | OUTPATIENT
Start: 2022-02-03 | End: 2022-02-03 | Stop reason: HOSPADM

## 2022-02-03 RX ORDER — ONDANSETRON 2 MG/ML
INJECTION INTRAMUSCULAR; INTRAVENOUS PRN
Status: DISCONTINUED | OUTPATIENT
Start: 2022-02-03 | End: 2022-02-03

## 2022-02-03 RX ORDER — PROPOFOL 10 MG/ML
INJECTION, EMULSION INTRAVENOUS PRN
Status: DISCONTINUED | OUTPATIENT
Start: 2022-02-03 | End: 2022-02-03

## 2022-02-03 RX ORDER — HYDROMORPHONE HCL IN WATER/PF 6 MG/30 ML
0.4 PATIENT CONTROLLED ANALGESIA SYRINGE INTRAVENOUS EVERY 5 MIN PRN
Status: DISCONTINUED | OUTPATIENT
Start: 2022-02-03 | End: 2022-02-03 | Stop reason: HOSPADM

## 2022-02-03 RX ORDER — HYDROXYZINE HYDROCHLORIDE 50 MG/1
50 TABLET, FILM COATED ORAL EVERY 6 HOURS PRN
Status: DISCONTINUED | OUTPATIENT
Start: 2022-02-03 | End: 2022-02-03 | Stop reason: HOSPADM

## 2022-02-03 RX ORDER — ONDANSETRON 2 MG/ML
4 INJECTION INTRAMUSCULAR; INTRAVENOUS EVERY 30 MIN PRN
Status: CANCELLED | OUTPATIENT
Start: 2022-02-03

## 2022-02-03 RX ORDER — ONDANSETRON 4 MG/1
4 TABLET, ORALLY DISINTEGRATING ORAL EVERY 30 MIN PRN
Status: CANCELLED | OUTPATIENT
Start: 2022-02-03

## 2022-02-03 RX ORDER — DEXAMETHASONE SODIUM PHOSPHATE 4 MG/ML
4 INJECTION, SOLUTION INTRA-ARTICULAR; INTRALESIONAL; INTRAMUSCULAR; INTRAVENOUS; SOFT TISSUE ONCE
Status: CANCELLED | OUTPATIENT
Start: 2022-02-03

## 2022-02-03 RX ORDER — OFLOXACIN 3 MG/ML
SOLUTION/ DROPS OPHTHALMIC
Qty: 10 ML | Refills: 3 | Status: SHIPPED | OUTPATIENT
Start: 2022-02-03 | End: 2022-03-23

## 2022-02-03 RX ORDER — FENTANYL CITRATE 50 UG/ML
50 INJECTION, SOLUTION INTRAMUSCULAR; INTRAVENOUS
Status: CANCELLED | OUTPATIENT
Start: 2022-02-03

## 2022-02-03 RX ORDER — DIMENHYDRINATE 50 MG/ML
25 INJECTION, SOLUTION INTRAMUSCULAR; INTRAVENOUS
Status: DISCONTINUED | OUTPATIENT
Start: 2022-02-03 | End: 2022-02-03 | Stop reason: HOSPADM

## 2022-02-03 RX ORDER — HYDROMORPHONE HCL IN WATER/PF 6 MG/30 ML
0.4 PATIENT CONTROLLED ANALGESIA SYRINGE INTRAVENOUS EVERY 5 MIN PRN
Status: CANCELLED | OUTPATIENT
Start: 2022-02-03

## 2022-02-03 RX ADMIN — FENTANYL CITRATE 50 MCG: 50 INJECTION, SOLUTION INTRAMUSCULAR; INTRAVENOUS at 09:47

## 2022-02-03 RX ADMIN — KETOROLAC TROMETHAMINE 15 MG: 15 INJECTION, SOLUTION INTRAMUSCULAR; INTRAVENOUS at 09:58

## 2022-02-03 RX ADMIN — PROPOFOL 200 MCG/KG/MIN: 10 INJECTION, EMULSION INTRAVENOUS at 08:48

## 2022-02-03 RX ADMIN — ONDANSETRON 4 MG: 4 TABLET, ORALLY DISINTEGRATING ORAL at 11:15

## 2022-02-03 RX ADMIN — SODIUM CHLORIDE, POTASSIUM CHLORIDE, SODIUM LACTATE AND CALCIUM CHLORIDE: 600; 310; 30; 20 INJECTION, SOLUTION INTRAVENOUS at 08:44

## 2022-02-03 RX ADMIN — FENTANYL CITRATE 50 MCG: 50 INJECTION, SOLUTION INTRAMUSCULAR; INTRAVENOUS at 08:50

## 2022-02-03 RX ADMIN — LIDOCAINE HYDROCHLORIDE 50 MG: 10 INJECTION, SOLUTION INFILTRATION; PERINEURAL at 08:50

## 2022-02-03 RX ADMIN — HYDROXYZINE HYDROCHLORIDE 50 MG: 50 TABLET, FILM COATED ORAL at 10:09

## 2022-02-03 RX ADMIN — ONDANSETRON 4 MG: 2 INJECTION INTRAMUSCULAR; INTRAVENOUS at 09:04

## 2022-02-03 RX ADMIN — MIDAZOLAM 2 MG: 1 INJECTION INTRAMUSCULAR; INTRAVENOUS at 08:47

## 2022-02-03 RX ADMIN — HYDROMORPHONE HYDROCHLORIDE 0.4 MG: 0.2 INJECTION, SOLUTION INTRAMUSCULAR; INTRAVENOUS; SUBCUTANEOUS at 10:17

## 2022-02-03 RX ADMIN — PROPOFOL 160 MG: 10 INJECTION, EMULSION INTRAVENOUS at 08:50

## 2022-02-03 RX ADMIN — ACETAMINOPHEN 975 MG: 325 TABLET, FILM COATED ORAL at 08:06

## 2022-02-03 RX ADMIN — HYDROMORPHONE HYDROCHLORIDE 0.4 MG: 0.2 INJECTION, SOLUTION INTRAMUSCULAR; INTRAVENOUS; SUBCUTANEOUS at 09:58

## 2022-02-03 RX ADMIN — PROPOFOL 100 MG: 10 INJECTION, EMULSION INTRAVENOUS at 09:02

## 2022-02-03 RX ADMIN — FENTANYL CITRATE 50 MCG: 50 INJECTION, SOLUTION INTRAMUSCULAR; INTRAVENOUS at 09:04

## 2022-02-03 RX ADMIN — DEXAMETHASONE SODIUM PHOSPHATE 4 MG: 4 INJECTION, SOLUTION INTRA-ARTICULAR; INTRALESIONAL; INTRAMUSCULAR; INTRAVENOUS; SOFT TISSUE at 09:04

## 2022-02-03 RX ADMIN — OXYCODONE HYDROCHLORIDE 10 MG: 5 TABLET ORAL at 11:15

## 2022-02-03 RX ADMIN — FENTANYL CITRATE 50 MCG: 50 INJECTION, SOLUTION INTRAMUSCULAR; INTRAVENOUS at 09:41

## 2022-02-03 ASSESSMENT — MIFFLIN-ST. JEOR: SCORE: 1506.43

## 2022-02-03 NOTE — ANESTHESIA CARE TRANSFER NOTE
Patient: Obdulia Black    Procedure: Procedure(s):  MYRINGOTOMY, BILATERAL, WITH VENTILATION TUBE INSERTION  ENDOSCOPIC EUSTACHAIN TUBE BALLOON DILATION       Diagnosis: Dysfunction of both eustachian tubes [H69.83]  Conductive hearing loss, bilateral [H90.0]  History of placement of ear tubes [Z96.22]  History of allergic rhinitis [Z87.09]  Diagnosis Additional Information: No value filed.    Anesthesia Type:   General     Note:    Oropharynx: oropharynx clear of all foreign objects and spontaneously breathing  Level of Consciousness: awake  Oxygen Supplementation: face mask  Level of Supplemental Oxygen (L/min / FiO2): 6  Independent Airway: airway patency satisfactory and stable  Dentition: dentition unchanged  Vital Signs Stable: post-procedure vital signs reviewed and stable  Report to RN Given: handoff report given  Patient transferred to: PACU    Handoff Report: Identifed the Patient, Identified the Reponsible Provider, Reviewed the pertinent medical history, Discussed the surgical course, Reviewed Intra-OP anesthesia mangement and issues during anesthesia, Set expectations for post-procedure period and Allowed opportunity for questions and acknowledgement of understanding      Vitals:  Vitals Value Taken Time   BP     Temp     Pulse 79 02/03/22 0937   Resp 20 02/03/22 0937   SpO2 100 % 02/03/22 0937   Vitals shown include unvalidated device data.    Electronically Signed By: NICOLA Yi CRNA  February 3, 2022  9:37 AM

## 2022-02-03 NOTE — ANESTHESIA PROCEDURE NOTES
Airway      Staff -        CRNA: Minna Chacon APRN CRNA       Other Anesthesia Staff: Uzair Mukherjee       Performed By: CRNA and SRNA  Consent for Airway        Urgency: elective  Indications and Patient Condition       Indications for airway management: july-procedural       Induction type:intravenous       Mask difficulty assessment: 1 - vent by mask    Final Airway Details       Final airway type: supraglottic airway    Supraglottic Airway Details        Type: LMA       Brand: Supreme       LMA size: 3    Post intubation assessment        Placement verified by: capnometry and chest rise        Number of attempts at approach: 1       Number of other approaches attempted: 0       Secured with: plastic tape       Ease of procedure: easy       Dentition: Intact and Unchanged

## 2022-02-03 NOTE — ANESTHESIA POSTPROCEDURE EVALUATION
Patient: Obdulia Black    Procedure: Procedure(s):  MYRINGOTOMY, BILATERAL, WITH VENTILATION TUBE INSERTION  ENDOSCOPIC EUSTACHAIN TUBE BALLOON DILATION       Diagnosis:Dysfunction of both eustachian tubes [H69.83]  Conductive hearing loss, bilateral [H90.0]  History of placement of ear tubes [Z96.22]  History of allergic rhinitis [Z87.09]  Diagnosis Additional Information: No value filed.    Anesthesia Type:  General    Note:  Disposition: Outpatient   Postop Pain Control: Uneventful            Sign Out: Well controlled pain   PONV: No   Neuro/Psych: Uneventful            Sign Out: Acceptable/Baseline neuro status   Airway/Respiratory: Uneventful            Sign Out: Acceptable/Baseline resp. status   CV/Hemodynamics:    Other NRE: NONE   DID A NON-ROUTINE EVENT OCCUR? No           Last vitals:  Vitals Value Taken Time   /78 02/03/22 1030   Temp     Pulse 71 02/03/22 1034   Resp 19 02/03/22 1034   SpO2 99 % 02/03/22 1034   Vitals shown include unvalidated device data.    Electronically Signed By: Jaime Hernandez CRNA, APRN CRNA  February 3, 2022  11:11 AM

## 2022-02-03 NOTE — OP NOTE
PREOPERATIVE DIAGNOSES:  Eustachian tube dysfunction, middle ear barotrauma a mild conductive hearing loss.       POSTOPERATIVE DIAGNOSES: Same.     PROCEDURE PERFORMED:   1. Bilateral endoscopic eustachian tube balloon dilation   2. Bilateral myringotomy with tympanostomy tube placement     SURGEON: Maxx Feliciano MD      ASSISTANTS: None.     BLOOD LOSS:  Less than 10 mL.     COMPLICATIONS: None.      SPECIMENS: None.     ANESTHESIA: General.     GRAFTS, IMPLANTS, DRAINS: None.     INDICATIONS: Improve symptoms.      FINDINGS:   1. Midline nasal septum, normal-appearing inferior turbinates  2. Normal eustachian tube openings bilaterally  3. Right intact tympanic membrane with moderate retraction without middle-ear effusion.  4. Left intact tympanic membrane with mild retraction without middle-ear effusion.    OPERATIVE TECHNIQUE: The patient was brought to the operating room and identified by name clinic number.  They were placed supinely on the operating room table and general anesthesia was induced by the anesthesia service.  The patient was prepped and draped in standard fashion.    After standard surgical pause, the microscope was brought to the field.  I first examined the ear on the right.  Cerumen was cleaned with curette.  A radial myringotomy was placed in the posterior-inferior quadrant.  The middle ear was suctioned free and Dura-Vent ear tube was placed into the myringotomy.  Drops were instilled in the ear and a cotton was placed.     Attention was then turned to the left ear. Cerumen was cleaned with curette.  A radial myringotomy was placed in the posterior-inferior quadrant.  The middle ear was suctioned free and Dura-Vent ear tube was placed into the myringotomy.  Drops were instilled in the ear and a cotton was placed.    Afrin-soaked pledgets were placed in the nasal cavities bilaterally.  After standard surgical pause, the 0 degree endoscope was used to visualize the right nasal cavity.    A  Drain was used to outfracture the inferior turbinate.  Using the 6 mm AcclarENT eustachian tube balloon, the balloon was fed up the eustachian tube orifice to the indicator line.  The balloon was inflated to 12 dharmesh of pressure and left in place for 2 minutes.  The balloon was deflated and removed.  There is no bleeding or signs of injury.      Next, attention was turned to the left.  A Drain was used to outfracture the inferior turbinate.  Using the 6 mm AcclarENT eustachian tube balloon, the balloon was fed up the eustachian tube orifice to the indicator line.  The balloon was inflated to 12 dharmesh of pressure and left in place for 2 minutes.  The balloon was deflated and removed.  There is no bleeding or signs of injury.     The bilateral nasal cavities were irrigated and suctioned.  Hemostasis was assured.  This marked the end of the procedure.  The patient was then turned over to anesthesia for recovery where they were awakened, extubated, and transferred to the PACU in excellent condition.  There were no complications.  There was minimal blood loss.  All standard operating protocol universal precautions were used throughout the procedure.     Maxx Feliciano MD  Department of Otolaryngology-Head and Neck Surgery  [facility]

## 2022-02-03 NOTE — DISCHARGE INSTRUCTIONS
Discharge Instructions for Myringotomy Tubes (Ear Tubes)    Recovery - The placement of ear tubes is a brief operation, and therefore the recovery from the anesthetic is usually less than a day.  There are no restrictions on diet or activity after ear tube placement.  A low grade fever (up to 101 degrees) is not unusual in the day after tubes are placed.  Treat this with Acetaminophen (Tylenol) or Ibuprofen (Advil).  If the fever is higher, or does not respond to medication, call our office or call our nurse line after hours.  A small amount of drainage from the ears can occur for the first few days after ear tubes are placed, and this is perfectly normal, continue the ear drops as directed and it will clear up.  If drainage occurs after discontinued use of the ear drops, please call our office.    Medications - Children and adults can return to all preoperative medications after this procedure, including blood thinners.  You were sent home with ear drops, please use them as directed to assist in the rapid healing of the ear drum around the tube.  Pain medication may have been sent home with you, but a vast majority of the time, over-the-counter Tylenol or Ibuprofen is sufficient.    Water Precautions - In general, patients with ear tubes do not need to wear earplugs during water exposure. Swimming in water from chlorinated swimming pools, water at home from the tap, or large clean lakes does not require earplugs.  However, please prevent water from dirty water sources, such as smaller lakes, rivers, streams, and the ocean from getting in ears while the tubes are in place, as ear infections and drainage may occur as a result.  Some patients do not like the sensation of water in their ears following ear tubes. This sensitivity is normal. In this instance, they can wear earplugs during water exposure.      Follow up - Approximately 4-6 weeks after the tubes are placed I like to examine the ears to make sure things  have healed and the tubes are working properly.   Depending on the situation, a hearing test may or may not be performed at that time.  Afterwards, follow up is done every 6-12 months, but earlier if there are any issues or problems.    Advantages of Tubes - After ear tube placement, there are certain benefits from having a direct communication of the middle ear space with the ear canal.  In the event of drainage from the ears with ear tubes in place (which is common with colds and flus) use the ear drops you were discharged home with using the same dosage and instructions.  The ear drainage will clear up the ears without the need for oral antibiotics a majority of the time.  Another advantage is that with tubes in place, the ears automatically adjust to changes in atmospheric pressure (such as in airplanes or elevation).  In other words, if the tubes are open the ears will not hurt or pop!    If there are any questions or issues with the above, or if there are other issues that concern you, always feel free to call the clinic and I am happy to speak with you as soon as feasible.    Maxx Feliciano MD  Department of Otolaryngology-Head and Neck Surgery  [facility]  [phone] or [phone] After hours, [facility] option                            Same Day Surgery Discharge Instructions  Special Precautions After Surgery - Adult    1. It is not unusual to feel lightheaded or faint, up to 24 hours after surgery or while taking pain medication.  If you have these symptoms; sit for a few minutes before standing and have someone assist you when getting up.  2. You should rest and relax for the next 24 hours and must have someone stay with you for at least 24 hours after your discharge.  3. DO NOT DRIVE any vehicle or operate mechanical equipment for 24 hours following the end of your surgery.  DO NOT DRIVE while taking narcotic pain medications that have been prescribed by your physician.  If  you had a limb operated on, you must be able to use it fully to drive.  4. DO NOT drink alcoholic beverages for 24 hours following surgery or while taking prescription pain medication.  5. Drink clear liquids (apple juice, ginger ale, broth, 7-Up, etc.).  Progress to your regular diet as you feel able.  6. Any questions call your physician and do not make important decisions for 24 hours  __________________________________________________________________________________________________________________________________  IMPORTANT NUMBERS:    [facility] Main Number:  [phone], [phone]  Pharmacy:  [phone]  Same Day Surgery:  [phone], Monday - Friday until 8:30 p.m.  Urgent Care:  [phone]  Emergency Room:  [phone]      Surgery Specialty Clinic:  [phone]   Nurse Advice Line: [phone]

## 2022-03-01 NOTE — PROGRESS NOTES
Chief Complaint   Patient presents with     Post-op Visit     BMT 2/3/22- c/o pain in left ear daily rates 4/10/audio     History of Present Illness  Obdulia Black is a 36 year old female who presents today for follow-up.  The patient went to the operating room and underwent bilateral myringotomy with tube placement and bilateral endoscopic eustachian tube dilation on 2/3/2022.  The patient had a normal postoperative course.  The patient has not had any problems with otalgia or otorrhea.  She has noticed some achiness of the left ear for the past few days.  She does have a history of TMJ and does wear a mouthguard.  No hearing concerns.  The patient is otherwise doing well and has no ENT related concerns.    Past Medical History  Patient Active Problem List   Diagnosis     Esophageal reflux     CARDIOVASCULAR SCREENING; LDL GOAL LESS THAN 160     Tobacco use disorder     Allergic rhinitis     Prenatal care, first pregnancy     Numbness and tingling of both upper extremities while sleeping     Normal pregnancy     Vaginal delivery     Current Medications    Current Outpatient Medications:      acetaminophen (TYLENOL) 325 MG tablet, Take 1-2 tablets (325-650 mg) by mouth every 6 hours as needed for fever or pain, Disp: 200 tablet, Rfl: 1     buPROPion (WELLBUTRIN XL) 300 MG 24 hr tablet, Take 300 mg by mouth every morning, Disp: , Rfl:      cetirizine (ZYRTEC) 10 MG tablet, Take 10 mg by mouth daily PRN, Disp: , Rfl:      FLUoxetine (PROZAC) 40 MG capsule, Take 40 mg by mouth daily, Disp: , Rfl:      fluticasone (FLONASE) 50 MCG/ACT nasal spray, Spray 2 sprays in nostril daily, Disp: 1 Package, Rfl: 11     ibuprofen (ADVIL/MOTRIN) 200 MG tablet, Take 2 tablets (400 mg) by mouth every 6 hours as needed for fever, Disp: 200 tablet, Rfl: 1     naltrexone (DEPADE/REVIA) 50 MG tablet, Take 50 mg by mouth daily, Disp: , Rfl:      ofloxacin (OCUFLOX) 0.3 % ophthalmic solution, Post-op: Place 5 drops both ears twice daily  for 3 days.  Drainage: Place 5 drops in draining ear twice daily for 10 days.  Call if drainage persistent past 10 days., Disp: 10 mL, Rfl: 3     QUEtiapine (SEROQUEL) 25 MG tablet, Take 25 mg by mouth At Bedtime Takes 1-2 tablets as needed, Disp: , Rfl:      vitamin D3 (CHOLECALCIFEROL) 50 mcg (2000 units) tablet, Take 1 tablet by mouth daily Takes 2 tablets daily, Disp: , Rfl:     Allergies  Allergies   Allergen Reactions     Sulfa Drugs Itching and Swelling       Social History  Social History     Socioeconomic History     Marital status:      Spouse name: Not on file     Number of children: Not on file     Years of education: Not on file     Highest education level: Not on file   Occupational History     Not on file   Tobacco Use     Smoking status: Former Smoker     Packs/day: 0.00     Years: 10.00     Pack years: 0.00     Types: Cigarettes     Quit date: 2008     Years since quittin.7     Smokeless tobacco: Never Used     Tobacco comment: 0   Substance and Sexual Activity     Alcohol use: Yes     Alcohol/week: 0.0 standard drinks     Comment: rare     Drug use: Not Currently     Types: Marijuana     Comment: last use 2019     Sexual activity: Yes     Partners: Male     Birth control/protection: Condom, Pill     Comment:     Other Topics Concern     Parent/sibling w/ CABG, MI or angioplasty before 65F 55M? Yes   Social History Narrative     Not on file     Social Determinants of Health     Financial Resource Strain: Not on file   Food Insecurity: Not on file   Transportation Needs: Not on file   Physical Activity: Not on file   Stress: Not on file   Social Connections: Not on file   Intimate Partner Violence: Not on file   Housing Stability: Not on file       Family History  Family History   Problem Relation Age of Onset     Hypertension Maternal Grandmother      Other Cancer Maternal Grandmother         leukemia     Gastrointestinal Disease Maternal Grandmother         diverticulitis      Thyroid Disease Maternal Grandmother      Diabetes Maternal Grandfather      Cerebrovascular Disease Maternal Grandfather      Heart Disease Maternal Grandfather         MI- open heart surgery     Eye Disorder Maternal Grandfather      Depression Maternal Grandfather      Cancer Paternal Grandmother         leukemia     Prostate Cancer Paternal Grandfather      Alzheimer Disease Paternal Grandfather      Substance Abuse Paternal Grandfather         alcohol     Anemia Mother      Depression Mother      Anxiety Disorder Mother      Rheumatoid Arthritis Father      Diabetes Maternal Uncle      Substance Abuse Brother         prescription pills     Asthma Sister         Half sister     Lupus Other        Review of Systems  As per HPI and PMHx, otherwise 10 system review including the head and neck, constitutional, eyes, respiratory, GI, skin, neurologic, lymphatic, endocrine, and allergy systems is negative.    Physical Exam  /78 (BP Location: Right arm, Patient Position: Chair, Cuff Size: Adult Regular)   Pulse 107   Temp 98.2  F (36.8  C) (Tympanic)   Wt 85.7 kg (189 lb)   SpO2 100%   BMI 33.48 kg/m    GENERAL: Patient is a pleasant, cooperative 36 year old female in no acute distress.  HEAD: Normocephalic, atraumatic.  Hair and scalp are normal.  EYES: Pupils are equal, round, reactive to light and accommodation.  Extraocular movements are intact.  The sclera nonicteric without injection.  The extraocular structures are normal.  EARS: Normal shape and symmetry.  No tenderness when palpating the mastoid or tragal areas bilaterally.  Otoscopic exam reveals clear canals bilaterally.  There are bilateral Dura-Vent ear tubes in the posterior-inferior quadrants.  Middle ears are well aerated.  No granulation or drainage.  NOSE: Nares are patent.  Nasal mucosa is pink and moist.  Negative anterior rhinoscopy.  NEUROLOGIC: Cranial nerves II through XII are grossly intact.  Voice is strong.  Patient is  House-Brackman I/VI bilaterally.  CARDIOVASCULAR: Extremities are warm and well-perfused.  No significant peripheral edema.  RESPIRATORY: Patient has nonlabored breathing without cough, wheeze, stridor.  PSYCHIATRIC: Patient is alert and oriented.  Mood and affect appear normal.  SKIN: Warm and dry.  No scalp, face, or neck lesions noted.    Audiogram  The patient underwent an audiogram performed today.  My review of the audiogram shows normal hearing in both ears.  Pure-tone average is 15 dB on the right and 17 dB on the left.  Speech reception threshold is 10 dB on the right and 10 dB on the left.  The patient had 100% word recognition on the right and 100% word recognition on the left.  The patient had a large volume type B tympanogram on the right and a large volume type B tympanogram on the left.    Assessment and Plan    ICD-10-CM    1. Dysfunction of both eustachian tubes  H69.83 Adult Audiology Referral   2. Conductive hearing loss, bilateral  H90.0 Adult Audiology Referral   3. History of allergic rhinitis  Z87.09 Adult Audiology Referral   4. Status post myringotomy with tube placement of both ears  Z96.22 Adult Audiology Referral   5. Retained myringotomy tube in right ear  Z96.22 Adult Audiology Referral   6. Retained myringotomy tube in left ear  Z96.22 Adult Audiology Referral   7. Post-operative state  Z98.890 Adult Audiology Referral      It was my pleasure seeing Obdulia and their family today in clinic.  The patient is doing well after ear tube placement.  The tubes are in good placement and the hearing is normal.  I would recommend observation at this time.  The patient will return to clinic in 6 months for routine ear tube follow-up.  We discussed what to do in the event of acute otorrhea.  Instructions were provided.  Patient knows to contact me with problems or concerns.    Maxx Feliciano MD  Department of Otolaryngology-Head and Neck Surgery  [facility]

## 2022-03-07 ENCOUNTER — OFFICE VISIT (OUTPATIENT)
Dept: OTOLARYNGOLOGY | Facility: CLINIC | Age: 37
End: 2022-03-07
Payer: COMMERCIAL

## 2022-03-07 ENCOUNTER — OFFICE VISIT (OUTPATIENT)
Dept: AUDIOLOGY | Facility: CLINIC | Age: 37
End: 2022-03-07
Payer: COMMERCIAL

## 2022-03-07 VITALS
BODY MASS INDEX: 33.48 KG/M2 | DIASTOLIC BLOOD PRESSURE: 78 MMHG | TEMPERATURE: 98.2 F | SYSTOLIC BLOOD PRESSURE: 114 MMHG | WEIGHT: 189 LBS | OXYGEN SATURATION: 100 % | HEART RATE: 107 BPM

## 2022-03-07 DIAGNOSIS — Z96.22 RETAINED MYRINGOTOMY TUBE IN LEFT EAR: ICD-10-CM

## 2022-03-07 DIAGNOSIS — Z96.22 STATUS POST MYRINGOTOMY WITH TUBE PLACEMENT OF BOTH EARS: ICD-10-CM

## 2022-03-07 DIAGNOSIS — H90.0 CONDUCTIVE HEARING LOSS, BILATERAL: ICD-10-CM

## 2022-03-07 DIAGNOSIS — H69.93 DYSFUNCTION OF BOTH EUSTACHIAN TUBES: Primary | ICD-10-CM

## 2022-03-07 DIAGNOSIS — Z98.890 POST-OPERATIVE STATE: ICD-10-CM

## 2022-03-07 DIAGNOSIS — H69.93 DISORDER OF BOTH EUSTACHIAN TUBES: Primary | ICD-10-CM

## 2022-03-07 DIAGNOSIS — Z87.09 HISTORY OF ALLERGIC RHINITIS: ICD-10-CM

## 2022-03-07 DIAGNOSIS — Z96.22 RETAINED MYRINGOTOMY TUBE IN RIGHT EAR: ICD-10-CM

## 2022-03-07 PROCEDURE — 92567 TYMPANOMETRY: CPT | Performed by: AUDIOLOGIST

## 2022-03-07 PROCEDURE — 99213 OFFICE O/P EST LOW 20 MIN: CPT | Performed by: OTOLARYNGOLOGY

## 2022-03-07 PROCEDURE — 92557 COMPREHENSIVE HEARING TEST: CPT | Performed by: AUDIOLOGIST

## 2022-03-07 PROCEDURE — 99207 PR NO CHARGE LOS: CPT | Performed by: AUDIOLOGIST

## 2022-03-07 ASSESSMENT — PAIN SCALES - GENERAL: PAINLEVEL: MODERATE PAIN (4)

## 2022-03-07 NOTE — NURSING NOTE
"Initial /78 (BP Location: Right arm, Patient Position: Chair, Cuff Size: Adult Regular)   Pulse 107   Temp 98.2  F (36.8  C) (Tympanic)   Wt 85.7 kg (189 lb)   SpO2 100%   BMI 33.48 kg/m   Estimated body mass index is 33.48 kg/m  as calculated from the following:    Height as of 2/3/22: 1.6 m (5' 3\").    Weight as of this encounter: 85.7 kg (189 lb). .    Farrah Sullivan LPN    "

## 2022-03-07 NOTE — LETTER
3/7/2022         RE: Obdulia Black  [address] [facility] [address]        Dear Colleague,    Thank you for referring your patient, Obdulia Black, to the [facility]. Please see a copy of my visit note below.    Chief Complaint   Patient presents with     Post-op Visit     BMT 2/3/22- c/o pain in left ear daily rates 4/10/audio     History of Present Illness  Obdulia Black is a 36 year old female who presents today for follow-up.  The patient went to the operating room and underwent bilateral myringotomy with tube placement and bilateral endoscopic eustachian tube dilation on 2/3/2022.  The patient had a normal postoperative course.  The patient has not had any problems with otalgia or otorrhea.  She has noticed some achiness of the left ear for the past few days.  She does have a history of TMJ and does wear a mouthguard.  No hearing concerns.  The patient is otherwise doing well and has no ENT related concerns.    Past Medical History  Patient Active Problem List   Diagnosis     Esophageal reflux     CARDIOVASCULAR SCREENING; LDL GOAL LESS THAN 160     Tobacco use disorder     Allergic rhinitis     Prenatal care, first pregnancy     Numbness and tingling of both upper extremities while sleeping     Normal pregnancy     Vaginal delivery     Current Medications    Current Outpatient Medications:      acetaminophen (TYLENOL) 325 MG tablet, Take 1-2 tablets (325-650 mg) by mouth every 6 hours as needed for fever or pain, Disp: 200 tablet, Rfl: 1     buPROPion (WELLBUTRIN XL) 300 MG 24 hr tablet, Take 300 mg by mouth every morning, Disp: , Rfl:      cetirizine (ZYRTEC) 10 MG tablet, Take 10 mg by mouth daily PRN, Disp: , Rfl:      FLUoxetine (PROZAC) 40 MG capsule, Take 40 mg by mouth daily, Disp: , Rfl:      fluticasone (FLONASE) 50 MCG/ACT nasal spray, Spray 2 sprays in nostril daily, Disp: 1 Package, Rfl: 11     ibuprofen (ADVIL/MOTRIN) 200 MG tablet, Take 2 tablets  (400 mg) by mouth every 6 hours as needed for fever, Disp: 200 tablet, Rfl: 1     naltrexone (DEPADE/REVIA) 50 MG tablet, Take 50 mg by mouth daily, Disp: , Rfl:      ofloxacin (OCUFLOX) 0.3 % ophthalmic solution, Post-op: Place 5 drops both ears twice daily for 3 days.  Drainage: Place 5 drops in draining ear twice daily for 10 days.  Call if drainage persistent past 10 days., Disp: 10 mL, Rfl: 3     QUEtiapine (SEROQUEL) 25 MG tablet, Take 25 mg by mouth At Bedtime Takes 1-2 tablets as needed, Disp: , Rfl:      vitamin D3 (CHOLECALCIFEROL) 50 mcg (2000 units) tablet, Take 1 tablet by mouth daily Takes 2 tablets daily, Disp: , Rfl:     Allergies  Allergies   Allergen Reactions     Sulfa Drugs Itching and Swelling       Social History  Social History     Socioeconomic History     Marital status:      Spouse name: Not on file     Number of children: Not on file     Years of education: Not on file     Highest education level: Not on file   Occupational History     Not on file   Tobacco Use     Smoking status: Former Smoker     Packs/day: 0.00     Years: 10.00     Pack years: 0.00     Types: Cigarettes     Quit date: 2008     Years since quittin.7     Smokeless tobacco: Never Used     Tobacco comment: 0   Substance and Sexual Activity     Alcohol use: Yes     Alcohol/week: 0.0 standard drinks     Comment: rare     Drug use: Not Currently     Types: Marijuana     Comment: last use      Sexual activity: Yes     Partners: Male     Birth control/protection: Condom, Pill     Comment:     Other Topics Concern     Parent/sibling w/ CABG, MI or angioplasty before 65F 55M? Yes   Social History Narrative     Not on file     Social Determinants of Health     Financial Resource Strain: Not on file   Food Insecurity: Not on file   Transportation Needs: Not on file   Physical Activity: Not on file   Stress: Not on file   Social Connections: Not on file   Intimate Partner Violence: Not on file   Housing  Stability: Not on file       Family History  Family History   Problem Relation Age of Onset     Hypertension Maternal Grandmother      Other Cancer Maternal Grandmother         leukemia     Gastrointestinal Disease Maternal Grandmother         diverticulitis     Thyroid Disease Maternal Grandmother      Diabetes Maternal Grandfather      Cerebrovascular Disease Maternal Grandfather      Heart Disease Maternal Grandfather         MI- open heart surgery     Eye Disorder Maternal Grandfather      Depression Maternal Grandfather      Cancer Paternal Grandmother         leukemia     Prostate Cancer Paternal Grandfather      Alzheimer Disease Paternal Grandfather      Substance Abuse Paternal Grandfather         alcohol     Anemia Mother      Depression Mother      Anxiety Disorder Mother      Rheumatoid Arthritis Father      Diabetes Maternal Uncle      Substance Abuse Brother         prescription pills     Asthma Sister         Half sister     Lupus Other        Review of Systems  As per HPI and PMHx, otherwise 10 system review including the head and neck, constitutional, eyes, respiratory, GI, skin, neurologic, lymphatic, endocrine, and allergy systems is negative.    Physical Exam  /78 (BP Location: Right arm, Patient Position: Chair, Cuff Size: Adult Regular)   Pulse 107   Temp 98.2  F (36.8  C) (Tympanic)   Wt 85.7 kg (189 lb)   SpO2 100%   BMI 33.48 kg/m    GENERAL: Patient is a pleasant, cooperative 36 year old female in no acute distress.  HEAD: Normocephalic, atraumatic.  Hair and scalp are normal.  EYES: Pupils are equal, round, reactive to light and accommodation.  Extraocular movements are intact.  The sclera nonicteric without injection.  The extraocular structures are normal.  EARS: Normal shape and symmetry.  No tenderness when palpating the mastoid or tragal areas bilaterally.  Otoscopic exam reveals clear canals bilaterally.  There are bilateral Dura-Vent ear tubes in the posterior-inferior  quadrants.  Middle ears are well aerated.  No granulation or drainage.  NOSE: Nares are patent.  Nasal mucosa is pink and moist.  Negative anterior rhinoscopy.  NEUROLOGIC: Cranial nerves II through XII are grossly intact.  Voice is strong.  Patient is House-Brackman I/VI bilaterally.  CARDIOVASCULAR: Extremities are warm and well-perfused.  No significant peripheral edema.  RESPIRATORY: Patient has nonlabored breathing without cough, wheeze, stridor.  PSYCHIATRIC: Patient is alert and oriented.  Mood and affect appear normal.  SKIN: Warm and dry.  No scalp, face, or neck lesions noted.    Audiogram  The patient underwent an audiogram performed today.  My review of the audiogram shows normal hearing in both ears.  Pure-tone average is 15 dB on the right and 17 dB on the left.  Speech reception threshold is 10 dB on the right and 10 dB on the left.  The patient had 100% word recognition on the right and 100% word recognition on the left.  The patient had a large volume type B tympanogram on the right and a large volume type B tympanogram on the left.    Assessment and Plan    ICD-10-CM    1. Dysfunction of both eustachian tubes  H69.83 Adult Audiology Referral   2. Conductive hearing loss, bilateral  H90.0 Adult Audiology Referral   3. History of allergic rhinitis  Z87.09 Adult Audiology Referral   4. Status post myringotomy with tube placement of both ears  Z96.22 Adult Audiology Referral   5. Retained myringotomy tube in right ear  Z96.22 Adult Audiology Referral   6. Retained myringotomy tube in left ear  Z96.22 Adult Audiology Referral   7. Post-operative state  Z98.890 Adult Audiology Referral      It was my pleasure seeing Obdulia and their family today in clinic.  The patient is doing well after ear tube placement.  The tubes are in good placement and the hearing is normal.  I would recommend observation at this time.  The patient will return to clinic in 6 months for routine ear tube follow-up.  We discussed  what to do in the event of acute otorrhea.  Instructions were provided.  Patient knows to contact me with problems or concerns.    Maxx Feliciano MD  Department of Otolaryngology-Head and Neck Surgery  [facility]         Again, thank you for allowing me to participate in the care of your patient.        Sincerely,        Maxx Feliciano MD

## 2022-03-07 NOTE — PROGRESS NOTES
AUDIOLOGY REPORT:    Patient was referred from ENT by Dr. Feliciano for audiology evaluation. The patient had PE tubes placed and eustachian tube balloon dilation on 2/3/2022. She returns today for follow up and reports that she has generally been doing well, but her left ear started hurting a couple of days ago and her ears still feel plugged. The patient reports that she has had some drainage, but not recently. She has not noted any changes in hearing. The patient was last tested on 1/3/2022 and results showed mild low frequency conductive hearing loss bilaterally.    Testing:    Otoscopy:   Otoscopic exam indicates PE tubes present bilaterally     Tympanograms:    RIGHT: large ear canal volume consistent with patent PE tubes     LEFT:   large ear canal volume consistent with patent PE tubes    Thresholds:   Pure Tone Thresholds assessed using conventional audiometry with good reliability from 250-8000 Hz bilaterally using insert earphones and circumaural headphones     RIGHT:  normal hearing sensitivity at all tested frequencies     LEFT:    normal hearing sensitivity at all tested frequencies     Speech Reception Threshold:    RIGHT: 10 dB HL    LEFT:   10 dB HL  Results are in agreement with pure tone average.     Word Recognition Score:     RIGHT: 100% at 55 dB HL using NU-6 recorded word list.    LEFT:   100% at 55 dB HL using NU-6 recorded word list.    Discussed results with the patient. Compared to 1/3/2022, thresholds today are 15-25 dB better in the right ear at 250-500 Hz, 10 dB poorer in the right ear at 8000 Hz, 15 dB better in the left ear at 500 and 3000 Hz, and stable at all other tested frequencies.    Patient was returned to ENT for follow up.     Job Gonzales, CCC-A  Licensed Audiologist #[id number]  3/7/2022

## 2022-03-23 ENCOUNTER — APPOINTMENT (OUTPATIENT)
Dept: OBGYN | Facility: CLINIC | Age: 37
End: 2022-03-23
Payer: COMMERCIAL

## 2022-03-23 ENCOUNTER — PRENATAL OFFICE VISIT (OUTPATIENT)
Dept: OBGYN | Facility: CLINIC | Age: 37
End: 2022-03-23

## 2022-03-23 DIAGNOSIS — Z34.80 PRENATAL CARE, SUBSEQUENT PREGNANCY: ICD-10-CM

## 2022-03-23 PROCEDURE — 99207 PR NO CHARGE NURSE ONLY: CPT | Performed by: OBSTETRICS & GYNECOLOGY

## 2022-03-23 RX ORDER — PRENATAL VIT/IRON FUM/FOLIC AC 27MG-0.8MG
1 TABLET ORAL EVERY EVENING
COMMUNITY
Start: 2022-03-16 | End: 2023-06-29

## 2022-03-23 NOTE — PROGRESS NOTES
Lifestyle and nutrition teaching completed   Lillian Sullivan OB Intake Nurse    Patient supplied answers from flow sheet for:  Prenatal OB Questionnaire.

## 2022-04-05 ENCOUNTER — PRENATAL OFFICE VISIT (OUTPATIENT)
Dept: OBGYN | Facility: CLINIC | Age: 37
End: 2022-04-05
Payer: COMMERCIAL

## 2022-04-05 VITALS
HEART RATE: 101 BPM | WEIGHT: 190 LBS | HEIGHT: 63 IN | SYSTOLIC BLOOD PRESSURE: 126 MMHG | DIASTOLIC BLOOD PRESSURE: 77 MMHG | RESPIRATION RATE: 18 BRPM | BODY MASS INDEX: 33.66 KG/M2 | TEMPERATURE: 98.6 F

## 2022-04-05 DIAGNOSIS — Z34.81 PRENATAL CARE, SUBSEQUENT PREGNANCY IN FIRST TRIMESTER: Primary | ICD-10-CM

## 2022-04-05 DIAGNOSIS — O09.529 ANTEPARTUM MULTIGRAVIDA OF ADVANCED MATERNAL AGE: ICD-10-CM

## 2022-04-05 LAB
ABO/RH(D): NORMAL
ALBUMIN UR-MCNC: NEGATIVE MG/DL
ANTIBODY SCREEN: NEGATIVE
APPEARANCE UR: CLEAR
BACTERIA #/AREA URNS HPF: ABNORMAL /HPF
BILIRUB UR QL STRIP: NEGATIVE
COLOR UR AUTO: YELLOW
ERYTHROCYTE [DISTWIDTH] IN BLOOD BY AUTOMATED COUNT: 14.6 % (ref 10–15)
GLUCOSE UR STRIP-MCNC: NEGATIVE MG/DL
HCT VFR BLD AUTO: 36.9 % (ref 35–47)
HGB BLD-MCNC: 12.1 G/DL (ref 11.7–15.7)
HGB UR QL STRIP: ABNORMAL
KETONES UR STRIP-MCNC: NEGATIVE MG/DL
LEUKOCYTE ESTERASE UR QL STRIP: ABNORMAL
MCH RBC QN AUTO: 27.9 PG (ref 26.5–33)
MCHC RBC AUTO-ENTMCNC: 32.8 G/DL (ref 31.5–36.5)
MCV RBC AUTO: 85 FL (ref 78–100)
NITRATE UR QL: NEGATIVE
PH UR STRIP: 6 [PH] (ref 5–7)
PLATELET # BLD AUTO: 342 10E3/UL (ref 150–450)
RBC # BLD AUTO: 4.33 10E6/UL (ref 3.8–5.2)
RBC #/AREA URNS AUTO: ABNORMAL /HPF
SP GR UR STRIP: >=1.03 (ref 1–1.03)
SPECIMEN EXPIRATION DATE: NORMAL
SQUAMOUS #/AREA URNS AUTO: ABNORMAL /LPF
UROBILINOGEN UR STRIP-ACNC: 0.2 E.U./DL
WBC # BLD AUTO: 10.3 10E3/UL (ref 4–11)
WBC #/AREA URNS AUTO: ABNORMAL /HPF

## 2022-04-05 PROCEDURE — 86901 BLOOD TYPING SEROLOGIC RH(D): CPT | Performed by: OBSTETRICS & GYNECOLOGY

## 2022-04-05 PROCEDURE — 36415 COLL VENOUS BLD VENIPUNCTURE: CPT | Performed by: OBSTETRICS & GYNECOLOGY

## 2022-04-05 PROCEDURE — 86780 TREPONEMA PALLIDUM: CPT | Performed by: OBSTETRICS & GYNECOLOGY

## 2022-04-05 PROCEDURE — 81001 URINALYSIS AUTO W/SCOPE: CPT | Performed by: OBSTETRICS & GYNECOLOGY

## 2022-04-05 PROCEDURE — 99207 PR FIRST OB VISIT: CPT | Performed by: OBSTETRICS & GYNECOLOGY

## 2022-04-05 PROCEDURE — 87086 URINE CULTURE/COLONY COUNT: CPT | Performed by: OBSTETRICS & GYNECOLOGY

## 2022-04-05 PROCEDURE — 85027 COMPLETE CBC AUTOMATED: CPT | Performed by: OBSTETRICS & GYNECOLOGY

## 2022-04-05 PROCEDURE — 87491 CHLMYD TRACH DNA AMP PROBE: CPT | Performed by: OBSTETRICS & GYNECOLOGY

## 2022-04-05 PROCEDURE — 86803 HEPATITIS C AB TEST: CPT | Performed by: OBSTETRICS & GYNECOLOGY

## 2022-04-05 PROCEDURE — 87389 HIV-1 AG W/HIV-1&-2 AB AG IA: CPT | Performed by: OBSTETRICS & GYNECOLOGY

## 2022-04-05 PROCEDURE — 87340 HEPATITIS B SURFACE AG IA: CPT | Performed by: OBSTETRICS & GYNECOLOGY

## 2022-04-05 PROCEDURE — 87591 N.GONORRHOEAE DNA AMP PROB: CPT | Performed by: OBSTETRICS & GYNECOLOGY

## 2022-04-05 PROCEDURE — 76817 TRANSVAGINAL US OBSTETRIC: CPT | Performed by: OBSTETRICS & GYNECOLOGY

## 2022-04-05 PROCEDURE — 86850 RBC ANTIBODY SCREEN: CPT | Performed by: OBSTETRICS & GYNECOLOGY

## 2022-04-05 PROCEDURE — 86762 RUBELLA ANTIBODY: CPT | Performed by: OBSTETRICS & GYNECOLOGY

## 2022-04-05 PROCEDURE — 86900 BLOOD TYPING SEROLOGIC ABO: CPT | Performed by: OBSTETRICS & GYNECOLOGY

## 2022-04-05 NOTE — PROGRESS NOTES
Obdulia is a 36 year old  @ 9.2 weeks here for new ob visit.      Planned pregnancy    ROS: Ten point review of systems was reviewed and negative except the above.  Current Issues include: fatigue    OBhx:  x 1    Past Medical History:   Diagnosis Date     Chickenpox      Depression      Moderate persistent asthma      Past Surgical History:   Procedure Laterality Date     BIOPSY       ENDOSCOPIC BALLOON SINUPLASTY ACCLARENT Bilateral 2/3/2022    Procedure: ENDOSCOPIC EUSTACHAIN TUBE BALLOON DILATION;  Surgeon: Maxx Feliciano MD;  Location: [facility] OR     MOUTH SURGERY      wisdom teeth x 4     MYRINGOTOMY, INSERT TUBE BILATERAL, COMBINED Bilateral 2/3/2022    Procedure: MYRINGOTOMY, BILATERAL, WITH VENTILATION TUBE INSERTION;  Surgeon: Maxx Feliciano MD;  Location: [facility] OR     PE TUBES  3/02/92     SURGICAL HISTORY OF -   03    Excision of cyst on left middle finger     TONSILLECTOMY & ADENOIDECTOMY  1991     Patient Active Problem List    Diagnosis Date Noted     Prenatal care, subsequent pregnancy 2022     Priority: High     Antepartum multigravida of advanced maternal age 2022     Priority: Medium     Numbness and tingling of both upper extremities while sleeping 2016     Priority: Medium     Tobacco use disorder 2013     Priority: Medium     Allergic rhinitis 2013     Priority: Medium     Esophageal reflux 2006     Priority: Medium     CARDIOVASCULAR SCREENING; LDL GOAL LESS THAN 160 10/31/2010     Priority: Low        Allergies   Allergen Reactions     Sulfa Drugs Itching and Swelling     cetirizine (ZYRTEC) 10 MG tablet, Take 10 mg by mouth daily PRN  fluticasone (FLONASE) 50 MCG/ACT nasal spray, Spray 2 sprays in nostril daily  Prenatal Vit-Fe Fumarate-FA (PRENATAL MULTIVITAMIN W/IRON) 27-0.8 MG tablet, Take 1 tablet by mouth daily  sertraline (ZOLOFT) 50 MG tablet, Take 50 mg by mouth daily  buPROPion (WELLBUTRIN XL) 300 MG 24 hr tablet, Take 300 mg by  "mouth every morning (Patient not taking: Reported on 2022)  FLUoxetine (PROZAC) 40 MG capsule, Take 40 mg by mouth daily (Patient not taking: Reported on 2022)    No current facility-administered medications on file prior to visit.    FH: Her family history was reviewed and documented in its appropriate chart area.    Past Medical History of Father of Baby:   No significant medical history    Physical Exam: /77 (BP Location: Left arm, Patient Position: Chair, Cuff Size: Adult Regular)   Pulse 101   Temp 98.6  F (37  C) (Tympanic)   Resp 18   Ht 1.6 m (5' 3\")   Wt 86.2 kg (190 lb)   LMP 2022   Breastfeeding No   BMI 33.66 kg/m    General: Well developed, well nourished female  Skin: Normal  HEENT: Normal   Abdomen: Benign and Soft, flat, non-tender   Extremities: Normal  Neurological: Normal   Perineum: Normal   Vulva: Normal     Transvaginal ultrasound was performed.  A viable intrauterine pregnancy was seen.  CRL consistent with 9 weeks, 4 days.  Fetal heart motion was visualized.    EDC by LMP: 22   EDC by sono:  22  Final EDC: 22    A/P 36 year old  at  9.2 weeks    1. Discussed physician coverage, tertiary support, diet, exercise, weight gain, schedule of visits, routine and indicated ultrasounds, and childbirth education.    2. Options for  testing for chromosomal and birth defects were discussed with the patient.  Diagnostic tests include Chorionic Villus Sampling and Amniocentesis.  We discussed that these tests are definitive but invasive.     Screening tests include nuchal lucency/blood marker testing in the first trimester and quad screening and/or Level 2 ultrasound in the second trimester.  We discussed that these are screening tests and not diagnostic tests and that false positives and negatives are a distinct possibility.  We discussed that given her advanced maternal age risk of aneuploidy that the patient has the option of diagnostic testing " over screening.      Also discussed the option of NIPT, which is less invasive, more accurate, but not always covered by insurance.         3. Prenatal labs, GC, Chlamydia    4. Prenatal Vitamins    Tata Vergara M.D.

## 2022-04-05 NOTE — NURSING NOTE
"Initial /77 (BP Location: Left arm, Patient Position: Chair, Cuff Size: Adult Regular)   Pulse 101   Temp 98.6  F (37  C) (Tympanic)   Resp 18   Ht 1.6 m (5' 3\")   Wt 86.2 kg (190 lb)   LMP 01/30/2022   Breastfeeding No   BMI 33.66 kg/m   Estimated body mass index is 33.66 kg/m  as calculated from the following:    Height as of this encounter: 1.6 m (5' 3\").    Weight as of this encounter: 86.2 kg (190 lb). .      "

## 2022-04-06 LAB
C TRACH DNA SPEC QL PROBE+SIG AMP: NEGATIVE
HBV SURFACE AG SERPL QL IA: NONREACTIVE
HCV AB SERPL QL IA: NONREACTIVE
HIV 1+2 AB+HIV1 P24 AG SERPL QL IA: NONREACTIVE
N GONORRHOEA DNA SPEC QL NAA+PROBE: NEGATIVE
RUBV IGG SERPL QL IA: 1.74 INDEX
RUBV IGG SERPL QL IA: POSITIVE
T PALLIDUM AB SER QL: NONREACTIVE

## 2022-04-07 LAB — BACTERIA UR CULT: NORMAL

## 2022-04-21 ENCOUNTER — ALLIED HEALTH/NURSE VISIT (OUTPATIENT)
Dept: OBGYN | Facility: CLINIC | Age: 37
End: 2022-04-21
Payer: COMMERCIAL

## 2022-04-21 DIAGNOSIS — R30.0 DYSURIA: Primary | ICD-10-CM

## 2022-04-21 LAB
ALBUMIN UR-MCNC: NEGATIVE MG/DL
APPEARANCE UR: CLEAR
BILIRUB UR QL STRIP: NEGATIVE
COLOR UR AUTO: YELLOW
GLUCOSE UR STRIP-MCNC: NEGATIVE MG/DL
HGB UR QL STRIP: NEGATIVE
KETONES UR STRIP-MCNC: NEGATIVE MG/DL
LEUKOCYTE ESTERASE UR QL STRIP: NEGATIVE
NITRATE UR QL: NEGATIVE
PH UR STRIP: 6.5 [PH] (ref 5–7)
RBC #/AREA URNS AUTO: ABNORMAL /HPF
SP GR UR STRIP: 1.02 (ref 1–1.03)
SQUAMOUS #/AREA URNS AUTO: ABNORMAL /LPF
UROBILINOGEN UR STRIP-ACNC: 0.2 E.U./DL
WBC #/AREA URNS AUTO: ABNORMAL /HPF

## 2022-04-21 PROCEDURE — 99207 PR NO CHARGE NURSE ONLY: CPT

## 2022-04-21 PROCEDURE — 87086 URINE CULTURE/COLONY COUNT: CPT

## 2022-04-21 PROCEDURE — 81001 URINALYSIS AUTO W/SCOPE: CPT

## 2022-04-21 NOTE — NURSING NOTE
"Initial LMP 01/30/2022  Estimated body mass index is 33.66 kg/m  as calculated from the following:    Height as of 4/5/22: 1.6 m (5' 3\").    Weight as of 4/5/22: 86.2 kg (190 lb). .      "

## 2022-04-23 ENCOUNTER — NURSE TRIAGE (OUTPATIENT)
Dept: NURSING | Facility: CLINIC | Age: 37
End: 2022-04-23
Payer: COMMERCIAL

## 2022-04-23 LAB — BACTERIA UR CULT: NORMAL

## 2022-04-23 NOTE — TELEPHONE ENCOUNTER
Patient calling. Patient started on antibiotic for UTI on Thursday. Patient has had some vomiting after taking and now a rash all over that itches. Patient has taken a total of three doses. Little red dots that itch, no fever.   Rash started last evening. Medication started Thursday afternoon. Some coughing since starting medication. Sickness with vomiting after dose on Friday and patient unsure if she had eaten enough food with the medication and was sick all afternoon throwing up. Last night the itching started with rash on arm and now is widespread.   Protocol recommends consult on call provider  Patient uses [facility] [place] open until 1 pm.  Page to on call provider Dr. MAXWELL, ROSA J MD   Dr. Thompson called back right away but noted that medication prescribed by Deangelo Vergara and that she is an OB provider.  Paged on call for OB Anahi Jones. MD  Second page sent to provider Robert  Answering service contacted -   Received direction from Anahi Jones to contact patient.  -Okay to stop antibiotic - provider says urine culture negative. Patient should push fluids and note that urinary frequency is common in early pregnancy. Can use AZO over the counter if symptoms continue.  Patient should call back if new, concerning or worsening symptoms develop.      Provider Recommendation Follow Up:   Unable to reach patient/caregiver. Okay to leave detailed voicemail per patient. Left message on identified line and notified patient of above recommendations per Dr. Jones.      Gwen Evans. RN on 4/23/2022 at 11:56 AM  [facility]        Reason for Disposition    Hives or itching    Caller requesting information about medication during pregnancy; adult is not ill AND triager answers question    Additional Information    Negative: [1] Life-threatening reaction (anaphylaxis) in the past to the same drug AND [2] < 2 hours since exposure    Negative: Difficulty breathing or wheezing     Negative: [1] Hoarseness or cough AND [2] started soon after 1st dose of drug    Negative: [1] Swollen tongue AND [2] started soon after 1st dose of drug    Negative: [1] Purple or blood-colored rash (spots or dots) AND [2] fever    Negative: Sounds like a life-threatening emergency to the triager    Negative: Rash is only on 1 part of the body (localized)    Negative: Taking new non-prescription (OTC) antihistamine, decongestant, ear drops, eye drops, or other OTC cough/cold medicine    Negative: Taking new prescription antihistamine, allergy medicine, asthma medicine, eye drops, ear drops or nose drops    Negative: Rash started more than 3 days after stopping new prescription medicine    Negative: Swollen tongue    Negative: [1] Widespread hives AND [2] onset < 2 hours of exposure to 1st dose of drug    Negative: Fever    Negative: Patient sounds very sick or weak to the triager    Negative: [1] Purple or blood-colored rash (spots or dots) AND [2] no fever AND [3] sounds well to triager    Negative: [1] Taking new prescription medication AND [2] rash within 4 hours of 1st dose    Negative: Large or small blisters on skin (i.e., fluid filled bubbles or sacs)    Negative: Bloody crusts on lips or sores in mouth    Negative: Face or lip swelling    Protocols used: RASH - WIDESPREAD ON DRUGS-A-, MEDICATION QUESTION CALL-A-

## 2022-05-03 ENCOUNTER — OFFICE VISIT (OUTPATIENT)
Dept: URGENT CARE | Facility: URGENT CARE | Age: 37
End: 2022-05-03
Payer: COMMERCIAL

## 2022-05-03 ENCOUNTER — MYC MEDICAL ADVICE (OUTPATIENT)
Dept: OTOLARYNGOLOGY | Facility: CLINIC | Age: 37
End: 2022-05-03

## 2022-05-03 VITALS
TEMPERATURE: 97.9 F | WEIGHT: 189 LBS | RESPIRATION RATE: 18 BRPM | DIASTOLIC BLOOD PRESSURE: 75 MMHG | HEART RATE: 99 BPM | SYSTOLIC BLOOD PRESSURE: 112 MMHG | OXYGEN SATURATION: 99 % | BODY MASS INDEX: 33.48 KG/M2

## 2022-05-03 DIAGNOSIS — S99.922A FOOT INJURY, LEFT, INITIAL ENCOUNTER: Primary | ICD-10-CM

## 2022-05-03 PROCEDURE — 99214 OFFICE O/P EST MOD 30 MIN: CPT | Performed by: PHYSICIAN ASSISTANT

## 2022-05-03 NOTE — PROGRESS NOTES
"  Assessment & Plan     Foot injury, left, initial encounter  Patient has good ROM and no significant swelling or bruising. Will defer x-ray due to pregnancy at this time. Fitted with walking boot and crutches if needed. Continue with tylenol. Encouraged rest, ice, elevation. Follow up with podiatry if needed.     - Ankle/Foot Bracing Supplies Order for DME - ONLY FOR DME  - Crutches Order for DME - ONLY FOR DME  - Orthopedic  Referral; Future             BMI:   Estimated body mass index is 33.48 kg/m  as calculated from the following:    Height as of 4/5/22: 1.6 m (5' 3\").    Weight as of this encounter: 85.7 kg (189 lb).           Return in about 1 week (around 5/10/2022), or if symptoms worsen or fail to improve.    Shira Torres PA-C  [facility] [place]            Subjective   Chief Complaint   Patient presents with     Musculoskeletal Problem     Kick a landscape block on Friday night. Pain is outside of left foot and ankle.       HPI     MS Injury/Pain    Onset of symptoms was 4 day(s) ago.  Location: left foot and ankle   Context:       The injury happened while at home      Mechanism: accidentally kicked landscaping block       Patient experienced immediate pain  Course of symptoms is same.    Severity moderate  Current and Associated symptoms: Pain  Aggravating Factors: walking, weight-bearing, movement, repetitive motion and flexion/extension  Therapies to improve symptoms include: Tylenol  This is the first time this type of problem has occurred for this patient.             Review of Systems   Constitutional, HEENT, cardiovascular, pulmonary, gi and gu systems are negative, except as otherwise noted.      Objective    /75 (BP Location: Right arm, Patient Position: Sitting, Cuff Size: Adult Regular)   Pulse 99   Temp 97.9  F (36.6  C) (Tympanic)   Resp 18   Wt 85.7 kg (189 lb)   LMP 01/30/2022   SpO2 99%   BMI 33.48 kg/m    Body mass index is 33.48 kg/m . "     Physical Exam  Constitutional:       General: She is not in acute distress.  Musculoskeletal:      Comments: No obvious deformity, erythema, edema, or ecchymosis of the left ankle. There tenderness mostly with palpation around the lateral malleolus. Full active ROM. Lower extremity CMS intact.    Neurological:      Mental Status: She is alert.

## 2022-05-05 ENCOUNTER — TRANSCRIBE ORDERS (OUTPATIENT)
Dept: MATERNAL FETAL MEDICINE | Facility: HOSPITAL | Age: 37
End: 2022-05-05

## 2022-05-05 ENCOUNTER — PRENATAL OFFICE VISIT (OUTPATIENT)
Dept: OBGYN | Facility: CLINIC | Age: 37
End: 2022-05-05
Payer: COMMERCIAL

## 2022-05-05 VITALS
HEIGHT: 63 IN | SYSTOLIC BLOOD PRESSURE: 108 MMHG | RESPIRATION RATE: 12 BRPM | BODY MASS INDEX: 34.09 KG/M2 | WEIGHT: 192.4 LBS | TEMPERATURE: 99 F | DIASTOLIC BLOOD PRESSURE: 69 MMHG | HEART RATE: 98 BPM

## 2022-05-05 DIAGNOSIS — S99.922D INJURY OF LEFT FOOT, SUBSEQUENT ENCOUNTER: ICD-10-CM

## 2022-05-05 DIAGNOSIS — Z34.82 PRENATAL CARE, SUBSEQUENT PREGNANCY IN SECOND TRIMESTER: Primary | ICD-10-CM

## 2022-05-05 DIAGNOSIS — O26.90 PREGNANCY RELATED CONDITION, ANTEPARTUM: Primary | ICD-10-CM

## 2022-05-05 DIAGNOSIS — O09.529 ANTEPARTUM MULTIGRAVIDA OF ADVANCED MATERNAL AGE: ICD-10-CM

## 2022-05-05 PROCEDURE — 99207 PR PRENATAL VISIT: CPT | Performed by: OBSTETRICS & GYNECOLOGY

## 2022-05-05 NOTE — PROGRESS NOTES
"CC: Here for routine prenatal visit @ 13w4d   HPI: recently seen in Urgent care for foot injury (foot vs. Brick).  No cramping or bleeding.  No abdominal trauma.    PE: /69 (BP Location: Left arm, Patient Position: Sitting, Cuff Size: Adult Large)   Pulse 98   Temp 99  F (37.2  C) (Tympanic)   Resp 12   Ht 1.6 m (5' 3\")   Wt 87.3 kg (192 lb 6.4 oz)   LMP 2022   BMI 34.08 kg/m     See OB flowsheet    Labs WNL    A/P  @ 13w4d normal pregnancy    1. Routine prenatal care.  COVID restrictions and recommendations reviewed including iron supplementation.   2. Foot injury: podiatry referral placed  3. Allergies: reviewed over the counter options for seasonal allergies.    4. AMA: declines bloodwork.  Level 2 U/S ordered    RTC 4 weeks.      Tata Vergara M.D.    "

## 2022-05-05 NOTE — NURSING NOTE
"Initial /69 (BP Location: Left arm, Patient Position: Sitting, Cuff Size: Adult Large)   Pulse 98   Temp 99  F (37.2  C) (Tympanic)   Resp 12   Ht 1.6 m (5' 3\")   Wt 87.3 kg (192 lb 6.4 oz)   LMP 01/30/2022   BMI 34.08 kg/m   Estimated body mass index is 34.08 kg/m  as calculated from the following:    Height as of this encounter: 1.6 m (5' 3\").    Weight as of this encounter: 87.3 kg (192 lb 6.4 oz). .      "

## 2022-05-09 ENCOUNTER — OFFICE VISIT (OUTPATIENT)
Dept: PODIATRY | Facility: CLINIC | Age: 37
End: 2022-05-09
Attending: PHYSICIAN ASSISTANT
Payer: COMMERCIAL

## 2022-05-09 VITALS
SYSTOLIC BLOOD PRESSURE: 120 MMHG | HEART RATE: 108 BPM | HEIGHT: 63 IN | WEIGHT: 192 LBS | BODY MASS INDEX: 34.02 KG/M2 | DIASTOLIC BLOOD PRESSURE: 79 MMHG

## 2022-05-09 DIAGNOSIS — S99.922A FOOT INJURY, LEFT, INITIAL ENCOUNTER: Primary | ICD-10-CM

## 2022-05-09 DIAGNOSIS — S93.602A SPRAIN OF LEFT FOOT, INITIAL ENCOUNTER: ICD-10-CM

## 2022-05-09 PROCEDURE — 99203 OFFICE O/P NEW LOW 30 MIN: CPT | Performed by: PODIATRIST

## 2022-05-09 ASSESSMENT — PAIN SCALES - GENERAL: PAINLEVEL: SEVERE PAIN (6)

## 2022-05-09 NOTE — LETTER
"    5/9/2022         RE: Obdulia Black  [address] [facility] [address]        Dear Colleague,    Thank you for referring your patient, Obdulia Black, to the [facility] [place]. Please see a copy of my visit note below.    PATIENT HISTORY:  Obdulia Black is a 37 year old female who presents to clinic in consultation at the request of  Shira Torres PA-C with a chief complaint of left foot injury.  The patient is seen by themselves.  The patient relates the pain is primarily located around the lateral side of the left foot.  Patient describes injury as walking in her yard in the dark and kicked/tripped over a yard brick.  The patient relates pain that has been going on for 10 day(s).  The patient has previously tried walking boot and crutches with little relief.  Denies any prior history of similar issues..  The patient is unemployed.  Any previous notes and studies that pertain to the patient's condition were reviewed.         Past Medical History:   Past Medical History:   Diagnosis Date     Chickenpox      Depression      Moderate persistent asthma        Medications:   Current Outpatient Medications:      cetirizine (ZYRTEC) 10 MG tablet, Take 10 mg by mouth daily PRN, Disp: , Rfl:      omeprazole (PRILOSEC) 20 MG DR capsule, , Disp: , Rfl:      Prenatal Vit-Fe Fumarate-FA (PRENATAL MULTIVITAMIN W/IRON) 27-0.8 MG tablet, Take 1 tablet by mouth daily, Disp: , Rfl:      sertraline (ZOLOFT) 50 MG tablet, Take 50 mg by mouth daily, Disp: , Rfl:      fluticasone (FLONASE) 50 MCG/ACT nasal spray, Spray 2 sprays in nostril daily (Patient not taking: No sig reported), Disp: 1 Package, Rfl: 11     Allergies:    Allergies   Allergen Reactions     Sulfa Drugs Itching and Swelling       Vitals: /79   Pulse 108   Ht 1.6 m (5' 3\")   Wt 87.1 kg (192 lb)   LMP 01/30/2022   BMI 34.01 kg/m    BMI= Body mass index is 34.01 kg/m .    LOWER EXTREMITY PHYSICAL " EXAM    Dermatologic: Skin is intact to left lower extremity without significant lesions, rash or abrasion.        Vascular: DP & PT pulses are intact & regular on the left.   CFT and skin temperature is normal to the left lower extremity.     Neurologic: Lower extremity sensation is intact to light touch.  No evidence of weakness in the left lower extremity.        Musculoskeletal: Patient is ambulatory without assistive device or brace.  No gross ankle deformity noted.  No foot or ankle joint effusion is noted.  Noted pain on palpation over the dorsal lateral aspect of the left foot.  No surrounding erythema or ecchymosis noted.         ASSESSMENT / PLAN:     ICD-10-CM    1. Foot injury, left, initial encounter  S99.922A Orthopedic  Referral   2. Sprain of left foot, initial encounter  S93.602A        I have explained to Obdulia about the conditions.  We discussed the underlying contributing factors to the condition as well as treatment options along with expected length of recovery.  At this time, the patient was educated on the importance of offloading supportive shoes and other devices.  I demonstrated to the patient calf stretches to perform every hour daily until symptoms resolve.  After symptoms resolve, the patient was advised to perform the stretches 3 times daily to prevent future recurrence.  The patient was instructed to perform warm soaks with Epson salt after which to also apply over-the-counter Voltaren gel to deeply massage the injured tissue.  The patient was instructed to do this on a daily basis until symptoms resolve.   The patient will continue wearing the cam boot that will aid in offloading the tension forces to the soft tissues and prevent further inflammation.    The patient will return in four weeks for reevaluation and to determine if any further treatment will be needed.      Obdulia verbalized agreement with and understanding of the rational for the diagnosis and treatment plan.  All  questions were answered to best of my ability and the patient's satisfaction. The patient was advised to contact the clinic with any questions that may arise after the clinic visit.      Disclaimer: This note consists of symbols derived from keyboarding, dictation and/or voice recognition software. As a result, there may be errors in the script that have gone undetected. Please consider this when interpreting information found in this chart.       KIMBERLY Phan D.P.M., F.A.C.F.A.S.        Again, thank you for allowing me to participate in the care of your patient.        Sincerely,        Robinson Phan DPM

## 2022-05-09 NOTE — PROGRESS NOTES
"PATIENT HISTORY:  Obdulia Black is a 37 year old female who presents to clinic in consultation at the request of  Shira Torres PA-C with a chief complaint of left foot injury.  The patient is seen by themselves.  The patient relates the pain is primarily located around the lateral side of the left foot.  Patient describes injury as walking in her yard in the dark and kicked/tripped over a yard brick.  The patient relates pain that has been going on for 10 day(s).  The patient has previously tried walking boot and crutches with little relief.  Denies any prior history of similar issues..  The patient is unemployed.  Any previous notes and studies that pertain to the patient's condition were reviewed.         Past Medical History:   Past Medical History:   Diagnosis Date     Chickenpox      Depression      Moderate persistent asthma        Medications:   Current Outpatient Medications:      cetirizine (ZYRTEC) 10 MG tablet, Take 10 mg by mouth daily PRN, Disp: , Rfl:      omeprazole (PRILOSEC) 20 MG DR capsule, , Disp: , Rfl:      Prenatal Vit-Fe Fumarate-FA (PRENATAL MULTIVITAMIN W/IRON) 27-0.8 MG tablet, Take 1 tablet by mouth daily, Disp: , Rfl:      sertraline (ZOLOFT) 50 MG tablet, Take 50 mg by mouth daily, Disp: , Rfl:      fluticasone (FLONASE) 50 MCG/ACT nasal spray, Spray 2 sprays in nostril daily (Patient not taking: No sig reported), Disp: 1 Package, Rfl: 11     Allergies:    Allergies   Allergen Reactions     Sulfa Drugs Itching and Swelling       Vitals: /79   Pulse 108   Ht 1.6 m (5' 3\")   Wt 87.1 kg (192 lb)   LMP 01/30/2022   BMI 34.01 kg/m    BMI= Body mass index is 34.01 kg/m .    LOWER EXTREMITY PHYSICAL EXAM    Dermatologic: Skin is intact to left lower extremity without significant lesions, rash or abrasion.        Vascular: DP & PT pulses are intact & regular on the left.   CFT and skin temperature is normal to the left lower extremity.     Neurologic: Lower extremity sensation " is intact to light touch.  No evidence of weakness in the left lower extremity.        Musculoskeletal: Patient is ambulatory without assistive device or brace.  No gross ankle deformity noted.  No foot or ankle joint effusion is noted.  Noted pain on palpation over the dorsal lateral aspect of the left foot.  No surrounding erythema or ecchymosis noted.         ASSESSMENT / PLAN:     ICD-10-CM    1. Foot injury, left, initial encounter  S99.922A Orthopedic  Referral   2. Sprain of left foot, initial encounter  S93.602A        I have explained to Obdulia about the conditions.  We discussed the underlying contributing factors to the condition as well as treatment options along with expected length of recovery.  At this time, the patient was educated on the importance of offloading supportive shoes and other devices.  I demonstrated to the patient calf stretches to perform every hour daily until symptoms resolve.  After symptoms resolve, the patient was advised to perform the stretches 3 times daily to prevent future recurrence.  The patient was instructed to perform warm soaks with Epson salt after which to also apply over-the-counter Voltaren gel to deeply massage the injured tissue.  The patient was instructed to do this on a daily basis until symptoms resolve.   The patient will continue wearing the cam boot that will aid in offloading the tension forces to the soft tissues and prevent further inflammation.    The patient will return in four weeks for reevaluation and to determine if any further treatment will be needed.      Obdulia verbalized agreement with and understanding of the rational for the diagnosis and treatment plan.  All questions were answered to best of my ability and the patient's satisfaction. The patient was advised to contact the clinic with any questions that may arise after the clinic visit.      Disclaimer: This note consists of symbols derived from keyboarding, dictation and/or voice  recognition software. As a result, there may be errors in the script that have gone undetected. Please consider this when interpreting information found in this chart.       KIMBERLY Phan D.P.M., KYLAH.F.A.S.

## 2022-05-09 NOTE — PATIENT INSTRUCTIONS
Caring for your injured foot or ankle    1-3 days after injury  Rest and immobilize the injured foot or ankle.  Ice injured area 20/hour during the day if possible.  Compression, such as an ace wrap, will help reduce swelling.  Elevate the injured foot/ankle as much as possible  4 days to 4 weeks after injury  Contrast soaking  Get two 5-gallon buckets, one ice water the other warm water  Start soaking the injured foot/ankle in the ice water for up to 5 minutes then switch to the warm water for 5 minutes  Repeat this once then finish with one more soak in the ice water  Perform simple range of motion exercises while soaking  Massage the injured tissue gently with a topical muscle rub  Weight-bear as tolerated depending upon pain (this is not the type of pain that you should push through)  4+ weeks from injury  Increase activity as tolerated  Wear supportive shoes to  offload the tension forces and prevent future tissue damage.    Please notify the office if at any point there is increased pain, swelling or redness.

## 2022-05-09 NOTE — NURSING NOTE
"Chief Complaint   Patient presents with     Left Foot - Consult     injury       Initial /79   Pulse 108   Ht 1.6 m (5' 3\")   Wt 87.1 kg (192 lb)   LMP 01/30/2022   BMI 34.01 kg/m   Estimated body mass index is 34.01 kg/m  as calculated from the following:    Height as of this encounter: 1.6 m (5' 3\").    Weight as of this encounter: 87.1 kg (192 lb).  Medications and allergies reviewed.      Alia WHARTON MA    "

## 2022-06-08 ENCOUNTER — PRE VISIT (OUTPATIENT)
Dept: MATERNAL FETAL MEDICINE | Facility: HOSPITAL | Age: 37
End: 2022-06-08
Payer: COMMERCIAL

## 2022-06-10 ENCOUNTER — OFFICE VISIT (OUTPATIENT)
Dept: MATERNAL FETAL MEDICINE | Facility: HOSPITAL | Age: 37
End: 2022-06-10
Attending: OBSTETRICS & GYNECOLOGY
Payer: COMMERCIAL

## 2022-06-10 ENCOUNTER — ANCILLARY PROCEDURE (OUTPATIENT)
Dept: ULTRASOUND IMAGING | Facility: HOSPITAL | Age: 37
End: 2022-06-10
Attending: OBSTETRICS & GYNECOLOGY
Payer: COMMERCIAL

## 2022-06-10 DIAGNOSIS — O35.HXX0 CLUB FOOT OF FETUS AFFECTING ANTEPARTUM CARE OF MOTHER, SINGLE OR UNSPECIFIED FETUS: ICD-10-CM

## 2022-06-10 DIAGNOSIS — O26.90 PREGNANCY RELATED CONDITION, ANTEPARTUM: ICD-10-CM

## 2022-06-10 DIAGNOSIS — O09.522 MULTIGRAVIDA OF ADVANCED MATERNAL AGE IN SECOND TRIMESTER: ICD-10-CM

## 2022-06-10 DIAGNOSIS — O35.9XX0 SUSPECTED FETAL ANOMALY, ANTEPARTUM, SINGLE OR UNSPECIFIED FETUS: Primary | ICD-10-CM

## 2022-06-10 PROCEDURE — 76811 OB US DETAILED SNGL FETUS: CPT | Mod: 26 | Performed by: OBSTETRICS & GYNECOLOGY

## 2022-06-10 PROCEDURE — 99207 PR NO CHARGE LOS: CPT | Performed by: OBSTETRICS & GYNECOLOGY

## 2022-06-10 PROCEDURE — 96040 HC GENETIC COUNSELING, EACH 30 MINUTES: CPT | Performed by: GENETIC COUNSELOR, MS

## 2022-06-10 PROCEDURE — 76811 OB US DETAILED SNGL FETUS: CPT

## 2022-06-10 NOTE — PROGRESS NOTES
Please see the imaging tab for details of the ultrasound performed today.    Dia Ray MD  Specialist in Maternal-Fetal Medicine

## 2022-06-10 NOTE — PROGRESS NOTES
[facility] [place]  Genetic Counseling Consult    Patient:  Obdulia Black YOB: 1985   Date of Service:  6/10/22      Obdulia Black was seen at the [facility] for genetic consultation as part of her appointment for comprehensive ultrasound.  The indication for genetic counseling is advanced maternal age.       Impression/Plan:   Obdulia had a comprehensive (level II) ultrasound today.  Please see the ultrasound report for further details. A unilateral clubfoot was noted on ultrasound today.The patient declines genetic amniocentesis and aneuploidy screening today.    Pregnancy History:   /Parity:    Age at Delivery: 37 year old  JULIO: 2022, by Last Menstrual Period  Gestational Age: 18w5d    No significant complications or exposures were reported in the current pregnancy.    Pregnancy history:  o 2016: term, , female       Family History:   A three-generation pedigree was obtained in  and was updated today.    Obdulia shared that her maternal cousins daughter has Fanconi Anemia and is status post bone marrow transplant. Fanconi anemia is a group of inherited conditions that results in bone marrow failure. Common features in individuals with Fanconi anemia include progressive bone marrow failure beginning in childhood, physical abnormalities, and increased risk of certain types of cancers. Approximately 75% of individuals with Fanconi anemia have at least one physical feature, which can include short stature, limb malformations, abnormal skin pigmentation, and kidney and eye abnormalities. Some individuals with Fanconi anemia can also have increased susceptibility to infections, hearing loss, and some degree of developmental delay.     Fanconi anemia is most often autosomal recessive and caused by pathogenic changes in one of several different genes.. Parents of an affected individual are obligate carriers.  Some  forms of Fanconi anemia are associated with X-linked inheritance.    We discussed today that her cousin is likely a carrier which increases her own chances to also be a carrier for Fanconi anemia. Having more information on genetic testing for her cousin and her cousin's daughter would be helpful if Obdulia would like to pursue carrier testing to learn more about her and Paul's risks to have a child with Fanconi Anemia. Obdulia verbalized understanding of the possibility that she may be a carrier but is not interested in pursuing testing for Fanconi Anemia.    Otherwise, the reported family history is negative for multiple miscarriages, stillbirths, birth defects, mental retardation, known genetic conditions, and consanguinity.       Carrier Screening:   The patient reports that she and the father of the pregnancy have  ancestry:     Cystic fibrosis is an autosomal recessive genetic condition that occurs with increased frequency in individuals of  ancestry and carrier screening for this condition is available.  In addition,  screening in the [facility] includes cystic fibrosis.    Expanded carrier screening for mutations in a large panel of genes associated with autosomal recessive conditions including cystic fibrosis, spinal muscular atrophy, and others, is now available.    The patient has declined the carrier screening options reviewed today.       Risk Assessment for Chromosome Conditions:   We explained that the risk for fetal chromosome abnormalities increases with maternal age. We discussed specific features of common chromosome abnormalities, including Down syndrome, trisomy 13, trisomy 18, and sex chromosome trisomies.      At age 37 at delivery, the midtrimester risk to have a baby with Down syndrome is 1 in 98.     At age 37 at delivery, the midtrimester risk to have a baby with any chromosome abnormality is 1 in 51.     Obdulia did not have maternal serum screening earlier in  pregnancy.       Testing Options:   We discussed the following options:   Non-invasive Prenatal Testing (NIPT)    Maternal plasma cell-free DNA testing; first trimester ultrasound with nuchal translucency and nasal bone assessment is recommended, when appropriate    Screens for fetal trisomy 21, trisomy 13, trisomy 18, and sex chromosome aneuploidy    Cannot screen for open neural tube defects; maternal serum AFP after 15 weeks is recommended     Genetic Amniocentesis    Invasive procedure typically performed in the second trimester by which amniotic fluid is obtained for the purpose of chromosome analysis and/or other prenatal genetic analysis    Diagnostic results; >99% sensitivity for fetal chromosome abnormalities    AFAFP measurement tests for open neural tube defects     Comprehensive (Level II) ultrasound: Detailed ultrasound performed between 18-22 weeks gestation to screen for major birth defects and markers for aneuploidy.    We reviewed the benefits and limitations of this testing.  Screening tests provide a risk assessment specific to the pregnancy for certain fetal chromosome abnormalities, but cannot definitively diagnose or exclude a fetal chromosome abnormality.  Follow-up genetic counseling and consideration of diagnostic testing is recommended with any abnormal screening result.     Diagnostic tests carry inherent risks- including risk of miscarriage- that require careful consideration.  These tests can detect fetal chromosome abnormalities with greater than 99% certainty.  Results can be compromised by maternal cell contamination or mosaicism, and are limited by the resolution of cytogenetic G-banding technology.  There is no screening nor diagnostic test that can detect all forms of birth defects or mental disability.    It was a pleasure to be involved with Obdulia zendejas. Face-to-face time of the meeting was 30 minutes.    Joie Rosales MS, [facility]  [facility]  [place]  Phone:[phone]

## 2022-06-21 ENCOUNTER — PRENATAL OFFICE VISIT (OUTPATIENT)
Dept: OBGYN | Facility: CLINIC | Age: 37
End: 2022-06-21
Payer: COMMERCIAL

## 2022-06-21 VITALS
HEART RATE: 103 BPM | WEIGHT: 192.2 LBS | SYSTOLIC BLOOD PRESSURE: 108 MMHG | HEIGHT: 63 IN | TEMPERATURE: 97.1 F | DIASTOLIC BLOOD PRESSURE: 69 MMHG | RESPIRATION RATE: 16 BRPM | BODY MASS INDEX: 34.05 KG/M2

## 2022-06-21 DIAGNOSIS — Z34.82 PRENATAL CARE, SUBSEQUENT PREGNANCY IN SECOND TRIMESTER: Primary | ICD-10-CM

## 2022-06-21 DIAGNOSIS — K21.9 GASTROESOPHAGEAL REFLUX DISEASE WITHOUT ESOPHAGITIS: ICD-10-CM

## 2022-06-21 PROCEDURE — 99207 PR PRENATAL VISIT: CPT | Performed by: OBSTETRICS & GYNECOLOGY

## 2022-06-21 NOTE — NURSING NOTE
"Initial /69 (BP Location: Right arm, Patient Position: Chair, Cuff Size: Adult Regular)   Pulse 103   Temp 97.1  F (36.2  C) (Tympanic)   Resp 16   Ht 1.6 m (5' 3\")   Wt 87.2 kg (192 lb 3.2 oz)   LMP 01/30/2022   Breastfeeding No   BMI 34.05 kg/m   Estimated body mass index is 34.05 kg/m  as calculated from the following:    Height as of this encounter: 1.6 m (5' 3\").    Weight as of this encounter: 87.2 kg (192 lb 3.2 oz). .    Libby Sanchez, CHEPE    "

## 2022-06-21 NOTE — PROGRESS NOTES
"CC: Here for routine prenatal visit @ 20w2d   HPI: + FM, no ctx, no LOF, no VB.  No complaints.     PE: /69 (BP Location: Right arm, Patient Position: Chair, Cuff Size: Adult Regular)   Pulse 103   Temp 97.1  F (36.2  C) (Tympanic)   Resp 16   Ht 1.6 m (5' 3\")   Wt 87.2 kg (192 lb 3.2 oz)   LMP 2022   Breastfeeding No   BMI 34.05 kg/m     See OB flowsheet    L2US: fetal club foot    A/P  @ 20w2d normal pregnancy    1. Routine prenatal care.  COVID restrictions and recommendations reviewed including iron supplementation.   2. Fetal club foot: repeat MFM U/S  with likely referral to pediatric ortho    RTC 4 weeks.      Tata Vergara M.D.    "

## 2022-07-07 ENCOUNTER — ANCILLARY PROCEDURE (OUTPATIENT)
Dept: ULTRASOUND IMAGING | Facility: HOSPITAL | Age: 37
End: 2022-07-07
Attending: OBSTETRICS & GYNECOLOGY
Payer: COMMERCIAL

## 2022-07-07 ENCOUNTER — OFFICE VISIT (OUTPATIENT)
Dept: MATERNAL FETAL MEDICINE | Facility: HOSPITAL | Age: 37
End: 2022-07-07
Attending: OBSTETRICS & GYNECOLOGY
Payer: COMMERCIAL

## 2022-07-07 DIAGNOSIS — O35.9XX0 SUSPECTED FETAL ANOMALY, ANTEPARTUM, SINGLE OR UNSPECIFIED FETUS: ICD-10-CM

## 2022-07-07 DIAGNOSIS — O09.522 MULTIGRAVIDA OF ADVANCED MATERNAL AGE IN SECOND TRIMESTER: ICD-10-CM

## 2022-07-07 DIAGNOSIS — O35.HXX0 CLUB FOOT OF FETUS AFFECTING ANTEPARTUM CARE OF MOTHER, SINGLE OR UNSPECIFIED FETUS: Primary | ICD-10-CM

## 2022-07-07 PROCEDURE — 76816 OB US FOLLOW-UP PER FETUS: CPT

## 2022-07-07 PROCEDURE — 99207 PR NO CHARGE LOS: CPT | Performed by: OBSTETRICS & GYNECOLOGY

## 2022-07-07 PROCEDURE — 76816 OB US FOLLOW-UP PER FETUS: CPT | Mod: 26 | Performed by: OBSTETRICS & GYNECOLOGY

## 2022-07-07 NOTE — NURSING NOTE
Referral placed to [facility] for Pediatric Orthopedic surgery. Referral form, patient face sheet, ultrasound report from today faxed to referral line.    Maya Ross RN on 7/7/2022 at 10:39 AM

## 2022-07-07 NOTE — PROGRESS NOTES
"Please see \"Imaging\" tab under Chart Review for full details.    Obdulia Winn MD  Maternal Fetal Medicine    "

## 2022-07-20 ENCOUNTER — PRENATAL OFFICE VISIT (OUTPATIENT)
Dept: OBGYN | Facility: CLINIC | Age: 37
End: 2022-07-20
Payer: COMMERCIAL

## 2022-07-20 ENCOUNTER — TELEPHONE (OUTPATIENT)
Dept: OBGYN | Facility: CLINIC | Age: 37
End: 2022-07-20

## 2022-07-20 VITALS
WEIGHT: 196 LBS | DIASTOLIC BLOOD PRESSURE: 61 MMHG | BODY MASS INDEX: 34.73 KG/M2 | RESPIRATION RATE: 16 BRPM | TEMPERATURE: 97.9 F | HEART RATE: 108 BPM | HEIGHT: 63 IN | SYSTOLIC BLOOD PRESSURE: 118 MMHG

## 2022-07-20 DIAGNOSIS — B37.89 CANDIDIASIS OF BREAST: ICD-10-CM

## 2022-07-20 DIAGNOSIS — O09.529 ANTEPARTUM MULTIGRAVIDA OF ADVANCED MATERNAL AGE: ICD-10-CM

## 2022-07-20 DIAGNOSIS — B37.2 YEAST INFECTION OF THE SKIN: Primary | ICD-10-CM

## 2022-07-20 DIAGNOSIS — Z34.82 PRENATAL CARE, SUBSEQUENT PREGNANCY IN SECOND TRIMESTER: Primary | ICD-10-CM

## 2022-07-20 DIAGNOSIS — O35.HXX0 CLUB FOOT OF FETUS AFFECTING ANTEPARTUM CARE OF MOTHER, SINGLE OR UNSPECIFIED FETUS: ICD-10-CM

## 2022-07-20 LAB
ERYTHROCYTE [DISTWIDTH] IN BLOOD BY AUTOMATED COUNT: 14.9 % (ref 10–15)
GLUCOSE 1H P 50 G GLC PO SERPL-MCNC: 119 MG/DL (ref 70–129)
HCT VFR BLD AUTO: 31.5 % (ref 35–47)
HGB BLD-MCNC: 10.4 G/DL (ref 11.7–15.7)
MCH RBC QN AUTO: 29.1 PG (ref 26.5–33)
MCHC RBC AUTO-ENTMCNC: 33 G/DL (ref 31.5–36.5)
MCV RBC AUTO: 88 FL (ref 78–100)
PLATELET # BLD AUTO: 296 10E3/UL (ref 150–450)
RBC # BLD AUTO: 3.58 10E6/UL (ref 3.8–5.2)
T PALLIDUM AB SER QL: NONREACTIVE
WBC # BLD AUTO: 10.6 10E3/UL (ref 4–11)

## 2022-07-20 PROCEDURE — 86780 TREPONEMA PALLIDUM: CPT | Performed by: OBSTETRICS & GYNECOLOGY

## 2022-07-20 PROCEDURE — 36415 COLL VENOUS BLD VENIPUNCTURE: CPT | Performed by: OBSTETRICS & GYNECOLOGY

## 2022-07-20 PROCEDURE — 99207 PR PRENATAL VISIT: CPT | Performed by: OBSTETRICS & GYNECOLOGY

## 2022-07-20 PROCEDURE — 85027 COMPLETE CBC AUTOMATED: CPT | Performed by: OBSTETRICS & GYNECOLOGY

## 2022-07-20 PROCEDURE — 82950 GLUCOSE TEST: CPT | Performed by: OBSTETRICS & GYNECOLOGY

## 2022-07-20 NOTE — PROGRESS NOTES
"[facility]    Return OB Note    CC: Return OB     Subjective:  Obdulia is a 37 year old  at 24w3d   Denies vaginal bleeding, loss of fluid, or regular contractions. Good fetal movement.  Complaints today: Itching rash under breasts.     Objective:  /61 (BP Location: Right arm, Patient Position: Chair, Cuff Size: Adult Regular)   Pulse 108   Temp 97.9  F (36.6  C) (Tympanic)   Resp 16   Ht 1.6 m (5' 3\")   Wt 88.9 kg (196 lb)   LMP 2022   BMI 34.72 kg/m      Fundal height: 24cm  FHT: 150bpm    Assessment/Plan:   Encounter Diagnoses   Name Primary?     Prenatal care, subsequent pregnancy in second trimester Yes     Antepartum multigravida of advanced maternal age      Club foot of fetus affecting antepartum care of mother, single or unspecified fetus      Candidiasis of breast        IUP at 24w3d  -NOB labs normal  -Genetic screening: declined  -AMA: declined genetic screening. L2 with finding of R club foot, otherwise normal. Referral for pediatric ortho.  -Mid trimester labs today  -Miconazole powder for suspected topical candida under breasts  -Strict return precautions given    RTC 4 weeks    Edith Valdez DO"

## 2022-07-20 NOTE — NURSING NOTE
"Initial /61 (BP Location: Right arm, Patient Position: Chair, Cuff Size: Adult Regular)   Pulse 108   Temp 97.9  F (36.6  C) (Tympanic)   Resp 16   Ht 1.6 m (5' 3\")   Wt 88.9 kg (196 lb)   LMP 01/30/2022   BMI 34.72 kg/m   Estimated body mass index is 34.72 kg/m  as calculated from the following:    Height as of this encounter: 1.6 m (5' 3\").    Weight as of this encounter: 88.9 kg (196 lb). .      "

## 2022-07-20 NOTE — TELEPHONE ENCOUNTER
Miconazole powder is not covered by insurance and is very expensive. Can we switch to Nystatin powder?      Thank You,  Eleonora Gomes [facility]

## 2022-07-21 RX ORDER — NYSTATIN 100000 [USP'U]/G
POWDER TOPICAL
Qty: 60 G | Refills: 1 | Status: SHIPPED | OUTPATIENT
Start: 2022-07-21 | End: 2023-06-29

## 2022-07-21 NOTE — CONFIDENTIAL NOTE
7/20/2021 office visit with Dr. Valdez     Note as below from Dr. Valdez   Miconazole powder for suspected topical candida under breasts.    [facility] would like to switch to Nystatin powder instead due to cost.    Please advise.  Thank you.    Shanna Valentino   [facility]Gyn Clinic  RN

## 2022-07-21 NOTE — TELEPHONE ENCOUNTER
Office visit with Dr. Valdez 7/20  Office visit notes as below:    Miconazole powder for suspected topical candida under breasts    [facility] pharmacy requesting alternative due to cost.  Nystatin powder in order section     Please review and advise.  Thank you.    Shanna Valentino   Ob[facility]Gyn Clinic  RN

## 2022-08-23 ENCOUNTER — PRENATAL OFFICE VISIT (OUTPATIENT)
Dept: OBGYN | Facility: CLINIC | Age: 37
End: 2022-08-23
Payer: COMMERCIAL

## 2022-08-23 VITALS
BODY MASS INDEX: 35.78 KG/M2 | DIASTOLIC BLOOD PRESSURE: 83 MMHG | HEART RATE: 116 BPM | SYSTOLIC BLOOD PRESSURE: 113 MMHG | TEMPERATURE: 98.1 F | WEIGHT: 202 LBS

## 2022-08-23 DIAGNOSIS — Z34.80 SUPERVISION OF OTHER NORMAL PREGNANCY, ANTEPARTUM: Primary | ICD-10-CM

## 2022-08-23 PROCEDURE — 90715 TDAP VACCINE 7 YRS/> IM: CPT | Performed by: OBSTETRICS & GYNECOLOGY

## 2022-08-23 PROCEDURE — 99207 PR PRENATAL VISIT: CPT | Performed by: OBSTETRICS & GYNECOLOGY

## 2022-08-23 PROCEDURE — 90471 IMMUNIZATION ADMIN: CPT | Performed by: OBSTETRICS & GYNECOLOGY

## 2022-08-23 RX ORDER — FERROUS SULFATE 325(65) MG
325 TABLET ORAL
COMMUNITY
End: 2023-06-29

## 2022-08-23 NOTE — PROGRESS NOTES
37 year old  at 29w2d     - AMA, s/p level 2 US  - right club foot, planning peds ortho visit in third trimester, not scheduled yet  - anemia on daily po iron  - GERD on omeprazole  - TDaP today  - planning to BF, will contact insurance for a pump  - desirous of BTL if needing a CS, otherwise Mirena IUD at PP visit    RTC 2 wks     Nirmala Bray MD, MPH  [facility]

## 2022-08-23 NOTE — NURSING NOTE
"Initial /83 (BP Location: Right arm, Patient Position: Chair, Cuff Size: Adult Regular)   Pulse 116   Temp 98.1  F (36.7  C) (Tympanic)   Wt 91.6 kg (202 lb)   LMP 01/30/2022   Breastfeeding No   BMI 35.78 kg/m   Estimated body mass index is 35.78 kg/m  as calculated from the following:    Height as of 7/20/22: 1.6 m (5' 3\").    Weight as of this encounter: 91.6 kg (202 lb). .    Snehal Munguia MA    "

## 2022-09-06 NOTE — PROGRESS NOTES
Chief Complaint   Patient presents with     Follow Up     Recheck pe tubes     Ear Tube Follow Up     History of Present Illness  Obdulia Black is a 37 year old female who presents today for follow-up.  The patient went to the operating room and underwent bilateral myringotomy with tube placement and bilateral endoscopic eustachian tube dilation on 2/3/2022.  The patient was last seen on 3/7/2022 with ear tubes in good placement and hearing at baseline.  She returns today for routine ear tube follow-up.      Since last seeing the patient, the patient reports that the ears are stable.  She currently denies any otalgia, otorrhea, bloody otorrhea.  Hearing is at baseline.  She does have a history of TMJ and does wear a mouthguard.  The patient is otherwise doing well and has no ENT related concerns.    Past Medical History  Patient Active Problem List   Diagnosis     Esophageal reflux     CARDIOVASCULAR SCREENING; LDL GOAL LESS THAN 160     Tobacco use disorder     Allergic rhinitis     Numbness and tingling of both upper extremities while sleeping     Prenatal care, subsequent pregnancy     Antepartum multigravida of advanced maternal age     Club foot of fetus affecting antepartum care of mother, single or unspecified fetus     Current Medications    Current Outpatient Medications:      cetirizine (ZYRTEC) 10 MG tablet, Take 10 mg by mouth daily PRN, Disp: , Rfl:      ferrous sulfate (FEROSUL) 325 (65 Fe) MG tablet, Take 325 mg by mouth daily (with breakfast), Disp: , Rfl:      fluticasone (FLONASE) 50 MCG/ACT nasal spray, Spray 2 sprays in nostril daily, Disp: 1 Package, Rfl: 11     nystatin (MYCOSTATIN) 741016 UNIT/GM external powder, Apply topically daily as needed for itching Under breasts, Disp: 60 g, Rfl: 1     omeprazole (PRILOSEC) 20 MG DR capsule, Take 1 capsule (20 mg) by mouth daily, Disp: 90 capsule, Rfl: 0     Prenatal Vit-Fe Fumarate-FA (PRENATAL MULTIVITAMIN W/IRON) 27-0.8 MG tablet, Take 1 tablet by  mouth daily, Disp: , Rfl:      sertraline (ZOLOFT) 50 MG tablet, Take 50 mg by mouth daily, Disp: , Rfl:      miconazole (MICATIN) 2 % external powder, Apply topically daily as needed for itching Under breasts., Disp: 43 g, Rfl: 1    Allergies  Allergies   Allergen Reactions     Sulfa Drugs Itching and Swelling       Social History  Social History     Socioeconomic History     Marital status:    Tobacco Use     Smoking status: Former Smoker     Packs/day: 0.00     Years: 10.00     Pack years: 0.00     Types: Cigarettes     Quit date: 2008     Years since quittin.2     Smokeless tobacco: Never Used     Tobacco comment: 0   Vaping Use     Vaping Use: Never used   Substance and Sexual Activity     Alcohol use: Not Currently     Comment: rare     Drug use: Not Currently     Types: Marijuana     Comment: last use      Sexual activity: Yes     Partners: Male     Comment:     Other Topics Concern     Parent/sibling w/ CABG, MI or angioplasty before 65F 55M? Yes       Family History  Family History   Problem Relation Age of Onset     Anemia Mother      Depression Mother      Anxiety Disorder Mother      Rheumatoid Arthritis Father      Asthma Sister         Half sister     Substance Abuse Brother         prescription pills     Hypertension Maternal Grandmother      Other Cancer Maternal Grandmother         leukemia     Gastrointestinal Disease Maternal Grandmother         diverticulitis     Thyroid Disease Maternal Grandmother      Diabetes Maternal Grandfather      Cerebrovascular Disease Maternal Grandfather      Heart Disease Maternal Grandfather         MI- open heart surgery     Eye Disorder Maternal Grandfather      Depression Maternal Grandfather      Cancer Paternal Grandmother         leukemia     Prostate Cancer Paternal Grandfather      Alzheimer Disease Paternal Grandfather      Substance Abuse Paternal Grandfather         alcohol     Diabetes Maternal Uncle      Lupus Other         Review of Systems  As per HPI and PMHx, otherwise 10 system review including the head and neck, constitutional, eyes, respiratory, GI, skin, neurologic, lymphatic, endocrine, and allergy systems is negative.    Physical Exam  BP (!) 140/85 (BP Location: Right arm, Patient Position: Chair, Cuff Size: Adult Regular)   Pulse 108   Resp 18   LMP 01/30/2022   GENERAL: Patient is a pleasant, cooperative 37 year old female in no acute distress.  HEAD: Normocephalic, atraumatic.  Hair and scalp are normal.  EYES: Pupils are equal, round, reactive to light and accommodation.  Extraocular movements are intact.  The sclera nonicteric without injection.  The extraocular structures are normal.  EARS: Normal shape and symmetry.  No tenderness when palpating the mastoid or tragal areas bilaterally.  Otoscopic exam reveals clear canals bilaterally.  There are bilateral Dura-Vent ear tubes in the posterior-inferior quadrants.  Middle ears are well aerated.  No granulation or drainage.  NOSE: Nares are patent.  Nasal mucosa is pink and moist.  Negative anterior rhinoscopy.  NEUROLOGIC: Cranial nerves II through XII are grossly intact.  Voice is strong.  Patient is House-Brackman I/VI bilaterally.  CARDIOVASCULAR: Extremities are warm and well-perfused.  No significant peripheral edema.  RESPIRATORY: Patient has nonlabored breathing without cough, wheeze, stridor.  PSYCHIATRIC: Patient is alert and oriented.  Mood and affect appear normal.  SKIN: Warm and dry.  No scalp, face, or neck lesions noted.  NEUROLOGIC: Cranial nerves II through XII are grossly intact.  Voice is strong.  Patient is House-Brackmann I/VI bilaterally.  CARDIOVASCULAR: Extremities are warm and well-perfused.  No significant peripheral edema.  RESPIRATORY: Patient has nonlabored breathing without cough, wheeze, stridor.  PSYCHIATRIC: Patient is alert and oriented.  Mood and affect appear normal.  SKIN: Warm and dry.  No scalp, face, or neck lesions  noted.    Assessment and Plan     ICD-10-CM    1. Dysfunction of both eustachian tubes  H69.83    2. Conductive hearing loss, bilateral  H90.0    3. History of allergic rhinitis  Z87.09    4. Retained myringotomy tube in right ear  Z96.22    5. Retained myringotomy tube in left ear  Z96.22       It was my pleasure seeing Obdulia Black today in clinic.  The patient is doing well after ear tube placement.  The tubes are in good placement.  I would recommend observation at this time.  The patient will return to clinic in 6 months for routine ear tube follow-up.  We discussed what to do in the event of acute otorrhea.  Instructions were provided.  The patient knows to contact me with problems or concerns.    Obdulia to follow up with Primary Care provider regarding elevated blood pressure.    Maxx Feliciano MD  Department of Otolaryngology-Head and Neck Surgery  [facility]

## 2022-09-07 ENCOUNTER — PRENATAL OFFICE VISIT (OUTPATIENT)
Dept: OBGYN | Facility: CLINIC | Age: 37
End: 2022-09-07

## 2022-09-07 ENCOUNTER — OFFICE VISIT (OUTPATIENT)
Dept: OTOLARYNGOLOGY | Facility: CLINIC | Age: 37
End: 2022-09-07
Payer: COMMERCIAL

## 2022-09-07 VITALS
SYSTOLIC BLOOD PRESSURE: 122 MMHG | DIASTOLIC BLOOD PRESSURE: 70 MMHG | BODY MASS INDEX: 36.32 KG/M2 | HEART RATE: 96 BPM | HEIGHT: 63 IN | TEMPERATURE: 98.2 F | RESPIRATION RATE: 16 BRPM | WEIGHT: 205 LBS

## 2022-09-07 VITALS — SYSTOLIC BLOOD PRESSURE: 140 MMHG | DIASTOLIC BLOOD PRESSURE: 85 MMHG | RESPIRATION RATE: 18 BRPM | HEART RATE: 108 BPM

## 2022-09-07 DIAGNOSIS — Z96.22 RETAINED MYRINGOTOMY TUBE IN RIGHT EAR: ICD-10-CM

## 2022-09-07 DIAGNOSIS — H90.0 CONDUCTIVE HEARING LOSS, BILATERAL: ICD-10-CM

## 2022-09-07 DIAGNOSIS — Z96.22 RETAINED MYRINGOTOMY TUBE IN LEFT EAR: ICD-10-CM

## 2022-09-07 DIAGNOSIS — H69.93 DYSFUNCTION OF BOTH EUSTACHIAN TUBES: Primary | ICD-10-CM

## 2022-09-07 DIAGNOSIS — O09.523 MULTIGRAVIDA OF ADVANCED MATERNAL AGE IN THIRD TRIMESTER: Primary | ICD-10-CM

## 2022-09-07 DIAGNOSIS — Z87.09 HISTORY OF ALLERGIC RHINITIS: ICD-10-CM

## 2022-09-07 PROCEDURE — 99213 OFFICE O/P EST LOW 20 MIN: CPT | Performed by: OTOLARYNGOLOGY

## 2022-09-07 PROCEDURE — 99207 PR PRENATAL VISIT: CPT | Performed by: ADVANCED PRACTICE MIDWIFE

## 2022-09-07 NOTE — PROGRESS NOTES
Feeling well.  Baby is active. Denies any leaking of fluid, vaginal bleeding, regular uterine contractions, or headaches or other concerns.  Reviewed to call for contractions, loss of fluid, vaginal bleeding, decreased fetal movement or any other questions or concerns.    RTC in 2 weeks.  Nery Barrera, ELODIA, APRN, CNM

## 2022-09-07 NOTE — LETTER
9/7/2022         RE: Obdulia Black  [address] [facility] [address]        Dear Colleague,    Thank you for referring your patient, Obdulia Black, to the [facility]. Please see a copy of my visit note below.    Chief Complaint   Patient presents with     Follow Up     Recheck pe tubes     Ear Tube Follow Up     History of Present Illness  Obdulia Black is a 37 year old female who presents today for follow-up.  The patient went to the operating room and underwent bilateral myringotomy with tube placement and bilateral endoscopic eustachian tube dilation on 2/3/2022.  The patient was last seen on 3/7/2022 with ear tubes in good placement and hearing at baseline.  She returns today for routine ear tube follow-up.      Since last seeing the patient, the patient reports that the ears are stable.  She currently denies any otalgia, otorrhea, bloody otorrhea.  Hearing is at baseline.  She does have a history of TMJ and does wear a mouthguard.  The patient is otherwise doing well and has no ENT related concerns.    Past Medical History  Patient Active Problem List   Diagnosis     Esophageal reflux     CARDIOVASCULAR SCREENING; LDL GOAL LESS THAN 160     Tobacco use disorder     Allergic rhinitis     Numbness and tingling of both upper extremities while sleeping     Prenatal care, subsequent pregnancy     Antepartum multigravida of advanced maternal age     Club foot of fetus affecting antepartum care of mother, single or unspecified fetus     Current Medications    Current Outpatient Medications:      cetirizine (ZYRTEC) 10 MG tablet, Take 10 mg by mouth daily PRN, Disp: , Rfl:      ferrous sulfate (FEROSUL) 325 (65 Fe) MG tablet, Take 325 mg by mouth daily (with breakfast), Disp: , Rfl:      fluticasone (FLONASE) 50 MCG/ACT nasal spray, Spray 2 sprays in nostril daily, Disp: 1 Package, Rfl: 11     nystatin (MYCOSTATIN) 114439 UNIT/GM external powder, Apply topically daily as  needed for itching Under breasts, Disp: 60 g, Rfl: 1     omeprazole (PRILOSEC) 20 MG DR capsule, Take 1 capsule (20 mg) by mouth daily, Disp: 90 capsule, Rfl: 0     Prenatal Vit-Fe Fumarate-FA (PRENATAL MULTIVITAMIN W/IRON) 27-0.8 MG tablet, Take 1 tablet by mouth daily, Disp: , Rfl:      sertraline (ZOLOFT) 50 MG tablet, Take 50 mg by mouth daily, Disp: , Rfl:      miconazole (MICATIN) 2 % external powder, Apply topically daily as needed for itching Under breasts., Disp: 43 g, Rfl: 1    Allergies  Allergies   Allergen Reactions     Sulfa Drugs Itching and Swelling       Social History  Social History     Socioeconomic History     Marital status:    Tobacco Use     Smoking status: Former Smoker     Packs/day: 0.00     Years: 10.00     Pack years: 0.00     Types: Cigarettes     Quit date: 2008     Years since quittin.2     Smokeless tobacco: Never Used     Tobacco comment: 0   Vaping Use     Vaping Use: Never used   Substance and Sexual Activity     Alcohol use: Not Currently     Comment: rare     Drug use: Not Currently     Types: Marijuana     Comment: last use      Sexual activity: Yes     Partners: Male     Comment:     Other Topics Concern     Parent/sibling w/ CABG, MI or angioplasty before 65F 55M? Yes       Family History  Family History   Problem Relation Age of Onset     Anemia Mother      Depression Mother      Anxiety Disorder Mother      Rheumatoid Arthritis Father      Asthma Sister         Half sister     Substance Abuse Brother         prescription pills     Hypertension Maternal Grandmother      Other Cancer Maternal Grandmother         leukemia     Gastrointestinal Disease Maternal Grandmother         diverticulitis     Thyroid Disease Maternal Grandmother      Diabetes Maternal Grandfather      Cerebrovascular Disease Maternal Grandfather      Heart Disease Maternal Grandfather         MI- open heart surgery     Eye Disorder Maternal Grandfather      Depression Maternal  Grandfather      Cancer Paternal Grandmother         leukemia     Prostate Cancer Paternal Grandfather      Alzheimer Disease Paternal Grandfather      Substance Abuse Paternal Grandfather         alcohol     Diabetes Maternal Uncle      Lupus Other        Review of Systems  As per HPI and PMHx, otherwise 10 system review including the head and neck, constitutional, eyes, respiratory, GI, skin, neurologic, lymphatic, endocrine, and allergy systems is negative.    Physical Exam  BP (!) 140/85 (BP Location: Right arm, Patient Position: Chair, Cuff Size: Adult Regular)   Pulse 108   Resp 18   LMP 01/30/2022   GENERAL: Patient is a pleasant, cooperative 37 year old female in no acute distress.  HEAD: Normocephalic, atraumatic.  Hair and scalp are normal.  EYES: Pupils are equal, round, reactive to light and accommodation.  Extraocular movements are intact.  The sclera nonicteric without injection.  The extraocular structures are normal.  EARS: Normal shape and symmetry.  No tenderness when palpating the mastoid or tragal areas bilaterally.  Otoscopic exam reveals clear canals bilaterally.  There are bilateral Dura-Vent ear tubes in the posterior-inferior quadrants.  Middle ears are well aerated.  No granulation or drainage.  NOSE: Nares are patent.  Nasal mucosa is pink and moist.  Negative anterior rhinoscopy.  NEUROLOGIC: Cranial nerves II through XII are grossly intact.  Voice is strong.  Patient is House-Brackman I/VI bilaterally.  CARDIOVASCULAR: Extremities are warm and well-perfused.  No significant peripheral edema.  RESPIRATORY: Patient has nonlabored breathing without cough, wheeze, stridor.  PSYCHIATRIC: Patient is alert and oriented.  Mood and affect appear normal.  SKIN: Warm and dry.  No scalp, face, or neck lesions noted.  NEUROLOGIC: Cranial nerves II through XII are grossly intact.  Voice is strong.  Patient is House-Brackmann I/VI bilaterally.  CARDIOVASCULAR: Extremities are warm and well-perfused.   No significant peripheral edema.  RESPIRATORY: Patient has nonlabored breathing without cough, wheeze, stridor.  PSYCHIATRIC: Patient is alert and oriented.  Mood and affect appear normal.  SKIN: Warm and dry.  No scalp, face, or neck lesions noted.    Assessment and Plan     ICD-10-CM    1. Dysfunction of both eustachian tubes  H69.83    2. Conductive hearing loss, bilateral  H90.0    3. History of allergic rhinitis  Z87.09    4. Retained myringotomy tube in right ear  Z96.22    5. Retained myringotomy tube in left ear  Z96.22       It was my pleasure seeing Obdulia Black today in clinic.  The patient is doing well after ear tube placement.  The tubes are in good placement.  I would recommend observation at this time.  The patient will return to clinic in 6 months for routine ear tube follow-up.  We discussed what to do in the event of acute otorrhea.  Instructions were provided.  The patient knows to contact me with problems or concerns.    Obdulia to follow up with Primary Care provider regarding elevated blood pressure.    Maxx Feliciano MD  Department of Otolaryngology-Head and Neck Surgery  [facility]         Again, thank you for allowing me to participate in the care of your patient.        Sincerely,        Maxx Feliciano MD

## 2022-09-07 NOTE — NURSING NOTE
"Initial /70 (BP Location: Right arm, Patient Position: Chair, Cuff Size: Adult Regular)   Pulse 96   Temp 98.2  F (36.8  C) (Tympanic)   Resp 16   Ht 1.6 m (5' 3\")   Wt 93 kg (205 lb)   LMP 01/30/2022   BMI 36.31 kg/m   Estimated body mass index is 36.31 kg/m  as calculated from the following:    Height as of this encounter: 1.6 m (5' 3\").    Weight as of this encounter: 93 kg (205 lb). .      "

## 2022-09-07 NOTE — NURSING NOTE
"Initial BP (!) 140/85 (BP Location: Right arm, Patient Position: Chair, Cuff Size: Adult Regular)   Pulse 108   Resp 18   LMP 01/30/2022  Estimated body mass index is 35.78 kg/m  as calculated from the following:    Height as of 7/20/22: 1.6 m (5' 3\").    Weight as of 8/23/22: 91.6 kg (202 lb). .    Patient is here for pe tube leonides.  ronel garcia LPN    "

## 2022-09-21 ENCOUNTER — PRENATAL OFFICE VISIT (OUTPATIENT)
Dept: OBGYN | Facility: CLINIC | Age: 37
End: 2022-09-21
Payer: COMMERCIAL

## 2022-09-21 ENCOUNTER — TELEPHONE (OUTPATIENT)
Dept: MATERNAL FETAL MEDICINE | Facility: CLINIC | Age: 37
End: 2022-09-21

## 2022-09-21 VITALS
DIASTOLIC BLOOD PRESSURE: 83 MMHG | WEIGHT: 210 LBS | HEIGHT: 63 IN | HEART RATE: 111 BPM | TEMPERATURE: 98.3 F | RESPIRATION RATE: 16 BRPM | SYSTOLIC BLOOD PRESSURE: 128 MMHG | BODY MASS INDEX: 37.21 KG/M2

## 2022-09-21 DIAGNOSIS — K21.9 GASTROESOPHAGEAL REFLUX DISEASE WITHOUT ESOPHAGITIS: Primary | ICD-10-CM

## 2022-09-21 DIAGNOSIS — Z34.83 PRENATAL CARE, SUBSEQUENT PREGNANCY IN THIRD TRIMESTER: ICD-10-CM

## 2022-09-21 DIAGNOSIS — O09.529 ANTEPARTUM MULTIGRAVIDA OF ADVANCED MATERNAL AGE: Primary | ICD-10-CM

## 2022-09-21 PROCEDURE — 99207 PR PRENATAL VISIT: CPT | Performed by: OBSTETRICS & GYNECOLOGY

## 2022-09-21 RX ORDER — OMEPRAZOLE 40 MG/1
40 CAPSULE, DELAYED RELEASE ORAL DAILY
Qty: 90 CAPSULE | Refills: 4 | Status: SHIPPED | OUTPATIENT
Start: 2022-09-21

## 2022-09-21 NOTE — PROGRESS NOTES
She has not heard from them. If another referral could be sent that would be great.     Thank you,    Dayanara Gomez LPN    ===View-only below this line===  ----- Message -----  From: Dia Ray MD  Sent: 9/21/2022   9:29 AM CDT  To: Dayanara Gomez LPN  Subject: RE: Pediatric Orthopedic Specialist              I sent a referral to Peds Ortho when I saw her at 22 weeks gestation (through [facility]). They usually contact the patient for prenatal consultation. Did Obdulia not here back from [facility]? Let me know if she didn't hear form them and we can send another referral from our office.    Thanks!  Dia    ----- Message -----  From: Dayanara Gomez LPN  Sent: 9/21/2022   9:12 AM CDT  To: Dia Ray MD  Subject: Pediatric Orthopedic Specialist                  Hi Dr. Ray,      Dr. Guillen is wondering if Obdulia needs to see the pediatric ortho specialists before or after baby is born, and if this is something she needs to coordinate or if your office was doing this?    If Dr. Guillen should be coordinating it, she is wondering who you recommend the patient be referred to.       Thank you,    Dayanara Gomez LPN

## 2022-09-21 NOTE — TELEPHONE ENCOUNTER
Phone call to Obdulia regarding referral to [facility] for fetal club foot. RN coordinator spoke with coordinator at [facility] to confirm they do have the referral. Recommend patient call [facility] to schedule prenatal consult at [phone].    Lexie Contreras RN

## 2022-09-21 NOTE — PROGRESS NOTES
"Initial /83 (BP Location: Right arm, Patient Position: Chair, Cuff Size: Adult Regular)   Pulse 111   Temp 98.3  F (36.8  C) (Tympanic)   Resp 16   Ht 1.6 m (5' 3\")   Wt 95.3 kg (210 lb)   LMP 01/30/2022   BMI 37.20 kg/m   Estimated body mass index is 37.2 kg/m  as calculated from the following:    Height as of this encounter: 1.6 m (5' 3\").    Weight as of this encounter: 95.3 kg (210 lb). .      "

## 2022-09-21 NOTE — PROGRESS NOTES
"[facility]     Return OB Note     CC: Return OB      Subjective:  Obdulia is a 37 year old  at 33w3d   Denies vaginal bleeding, loss of fluid, or regular contractions. Good fetal movement.  Complaints today: GERD     Objective:  /83 (BP Location: Right arm, Patient Position: Chair, Cuff Size: Adult Regular)   Pulse 111   Temp 98.3  F (36.8  C) (Tympanic)   Resp 16   Ht 1.6 m (5' 3\")   Wt 95.3 kg (210 lb)   LMP 2022   BMI 37.20 kg/m       See OB flowsheet     Assessment/Plan:        Encounter Diagnoses   Name Primary?     Prenatal care, subsequent pregnancy in second trimester Yes     Antepartum multigravida of advanced maternal age       Club foot of fetus affecting antepartum care of mother, single or unspecified fetus       Candidiasis of breast           IUP at 33w3d  -NOB labs normal  -Genetic screening: declined  -AMA: declined genetic screening. L2 with finding of R club foot, otherwise normal. Referral for pediatric ortho.  -Mid trimester labs nl  -Miconazole powder for suspected topical candida under breasts  -Strict return precautions given, OB triage # number  -  Wants tubal if c/s - double check with her that insurance is private     RTC 2 weeks  Anahi Guillen MD  OB/GYN        "

## 2022-09-23 NOTE — PROGRESS NOTES
Dia Ray MD Castillo, Breanna, LPN [facility] has the original referral, but they are behind in referrals. Our office called Obdulia and let her know that she should call [facility] directly.     Thanks!   Dia

## 2022-10-05 ENCOUNTER — PRENATAL OFFICE VISIT (OUTPATIENT)
Dept: OBGYN | Facility: CLINIC | Age: 37
End: 2022-10-05
Payer: COMMERCIAL

## 2022-10-05 VITALS
HEART RATE: 102 BPM | TEMPERATURE: 98.3 F | DIASTOLIC BLOOD PRESSURE: 76 MMHG | HEIGHT: 63 IN | SYSTOLIC BLOOD PRESSURE: 131 MMHG | BODY MASS INDEX: 37.19 KG/M2 | WEIGHT: 209.9 LBS | RESPIRATION RATE: 12 BRPM

## 2022-10-05 DIAGNOSIS — O21.9 NAUSEA AND VOMITING IN PREGNANCY: Primary | ICD-10-CM

## 2022-10-05 DIAGNOSIS — O36.63X0 EXCESSIVE FETAL GROWTH AFFECTING MANAGEMENT OF PREGNANCY IN THIRD TRIMESTER, SINGLE OR UNSPECIFIED FETUS: ICD-10-CM

## 2022-10-05 DIAGNOSIS — O09.529 ANTEPARTUM MULTIGRAVIDA OF ADVANCED MATERNAL AGE: ICD-10-CM

## 2022-10-05 DIAGNOSIS — R30.0 DYSURIA: ICD-10-CM

## 2022-10-05 LAB
ALBUMIN SERPL BCG-MCNC: 3.4 G/DL (ref 3.5–5.2)
ALBUMIN UR-MCNC: 100 MG/DL
ALP SERPL-CCNC: 203 U/L (ref 35–104)
ALT SERPL W P-5'-P-CCNC: 16 U/L (ref 10–35)
AMYLASE SERPL-CCNC: 35 U/L (ref 28–100)
ANION GAP SERPL CALCULATED.3IONS-SCNC: 13 MMOL/L (ref 7–15)
APPEARANCE UR: CLEAR
AST SERPL W P-5'-P-CCNC: 27 U/L (ref 10–35)
BILIRUB SERPL-MCNC: 0.2 MG/DL
BILIRUB UR QL STRIP: NEGATIVE
BUN SERPL-MCNC: 7 MG/DL (ref 6–20)
CALCIUM SERPL-MCNC: 8.8 MG/DL (ref 8.6–10)
CHLORIDE SERPL-SCNC: 100 MMOL/L (ref 98–107)
COLOR UR AUTO: YELLOW
CREAT SERPL-MCNC: 0.6 MG/DL (ref 0.51–0.95)
DEPRECATED HCO3 PLAS-SCNC: 22 MMOL/L (ref 22–29)
ERYTHROCYTE [DISTWIDTH] IN BLOOD BY AUTOMATED COUNT: 14.1 % (ref 10–15)
GFR SERPL CREATININE-BSD FRML MDRD: >90 ML/MIN/1.73M2
GLUCOSE SERPL-MCNC: 103 MG/DL (ref 70–99)
GLUCOSE UR STRIP-MCNC: NEGATIVE MG/DL
HCT VFR BLD AUTO: 38.7 % (ref 35–47)
HGB BLD-MCNC: 12.8 G/DL (ref 11.7–15.7)
HGB UR QL STRIP: NEGATIVE
KETONES UR STRIP-MCNC: NEGATIVE MG/DL
LEUKOCYTE ESTERASE UR QL STRIP: NEGATIVE
LIPASE SERPL-CCNC: 23 U/L (ref 13–60)
MCH RBC QN AUTO: 29.8 PG (ref 26.5–33)
MCHC RBC AUTO-ENTMCNC: 33.1 G/DL (ref 31.5–36.5)
MCV RBC AUTO: 90 FL (ref 78–100)
NITRATE UR QL: NEGATIVE
PH UR STRIP: 6 [PH] (ref 5–7)
PLATELET # BLD AUTO: 265 10E3/UL (ref 150–450)
POTASSIUM SERPL-SCNC: 3.9 MMOL/L (ref 3.4–5.3)
PROT SERPL-MCNC: 6.2 G/DL (ref 6.4–8.3)
RBC # BLD AUTO: 4.3 10E6/UL (ref 3.8–5.2)
RBC #/AREA URNS AUTO: NORMAL /HPF
SODIUM SERPL-SCNC: 135 MMOL/L (ref 136–145)
SP GR UR STRIP: 1.02 (ref 1–1.03)
UROBILINOGEN UR STRIP-ACNC: 0.2 E.U./DL
WBC # BLD AUTO: 10.3 10E3/UL (ref 4–11)
WBC #/AREA URNS AUTO: NORMAL /HPF

## 2022-10-05 PROCEDURE — 82150 ASSAY OF AMYLASE: CPT | Performed by: OBSTETRICS & GYNECOLOGY

## 2022-10-05 PROCEDURE — 81001 URINALYSIS AUTO W/SCOPE: CPT | Performed by: OBSTETRICS & GYNECOLOGY

## 2022-10-05 PROCEDURE — 99207 PR PRENATAL VISIT: CPT | Performed by: OBSTETRICS & GYNECOLOGY

## 2022-10-05 PROCEDURE — 85027 COMPLETE CBC AUTOMATED: CPT | Performed by: OBSTETRICS & GYNECOLOGY

## 2022-10-05 PROCEDURE — 87086 URINE CULTURE/COLONY COUNT: CPT | Performed by: OBSTETRICS & GYNECOLOGY

## 2022-10-05 PROCEDURE — 80053 COMPREHEN METABOLIC PANEL: CPT | Performed by: OBSTETRICS & GYNECOLOGY

## 2022-10-05 PROCEDURE — 36415 COLL VENOUS BLD VENIPUNCTURE: CPT | Performed by: OBSTETRICS & GYNECOLOGY

## 2022-10-05 PROCEDURE — 83690 ASSAY OF LIPASE: CPT | Performed by: OBSTETRICS & GYNECOLOGY

## 2022-10-05 RX ORDER — ONDANSETRON 4 MG/1
4 TABLET, FILM COATED ORAL EVERY 8 HOURS PRN
Qty: 30 TABLET | Refills: 3 | Status: SHIPPED | OUTPATIENT
Start: 2022-10-05 | End: 2023-06-29

## 2022-10-05 NOTE — PROGRESS NOTES
"[facility]     Return OB Note     CC: Return OB      Subjective:  Obdulia is a 37 year old  at 35w3d   Denies vaginal bleeding, loss of fluid, or regular contractions. Good fetal movement.  N/V in pregnancy. Water is ok, but even cereal is making her vomit.      Objective:  /76 (BP Location: Left arm, Patient Position: Sitting, Cuff Size: Adult Regular)   Pulse 102   Temp 98.3  F (36.8  C) (Tympanic)   Resp 12   Ht 1.6 m (5' 3\")   Wt 95.2 kg (209 lb 14.4 oz)   LMP 2022   BMI 37.18 kg/m       See OB flowsheet     Assessment/Plan:           Encounter Diagnoses   Name Primary?     Prenatal care, subsequent pregnancy in second trimester Yes     Antepartum multigravida of advanced maternal age       Club foot of fetus affecting antepartum care of mother, single or unspecified fetus       Candidiasis of breast           IUP at 35w3d  -NOB labs normal  -Genetic screening: declined  -AMA: declined genetic screening. L2 with finding of R club foot, otherwise normal. Referral for pediatric ortho scheduled.   -Mid trimester labs nl   -Miconazole powder for suspected topical candida under breasts  -Strict return precautions given, OB triage # number  -Wants tubal if c/s - private insurance under    - n/v; plan cbc, cmp, amylase, lipase. Added zofran.   - s>d: OB US ordered      RTC weekly until delivery    Anahi Guillen MD  OB/GYN           "

## 2022-10-05 NOTE — NURSING NOTE
"Initial /76 (BP Location: Left arm, Patient Position: Sitting, Cuff Size: Adult Regular)   Pulse 102   Temp 98.3  F (36.8  C) (Tympanic)   Resp 12   Ht 1.6 m (5' 3\")   Wt 95.2 kg (209 lb 14.4 oz)   LMP 01/30/2022   BMI 37.18 kg/m   Estimated body mass index is 37.18 kg/m  as calculated from the following:    Height as of this encounter: 1.6 m (5' 3\").    Weight as of this encounter: 95.2 kg (209 lb 14.4 oz). .      "

## 2022-10-06 LAB — BACTERIA UR CULT: NORMAL

## 2022-10-10 ENCOUNTER — PRENATAL OFFICE VISIT (OUTPATIENT)
Dept: OBGYN | Facility: CLINIC | Age: 37
End: 2022-10-10
Payer: COMMERCIAL

## 2022-10-10 VITALS
HEIGHT: 62 IN | RESPIRATION RATE: 18 BRPM | BODY MASS INDEX: 39.56 KG/M2 | DIASTOLIC BLOOD PRESSURE: 78 MMHG | WEIGHT: 215 LBS | SYSTOLIC BLOOD PRESSURE: 135 MMHG | HEART RATE: 101 BPM | TEMPERATURE: 98.2 F

## 2022-10-10 DIAGNOSIS — O09.523 MULTIGRAVIDA OF ADVANCED MATERNAL AGE IN THIRD TRIMESTER: Primary | ICD-10-CM

## 2022-10-10 DIAGNOSIS — K21.00 GASTROESOPHAGEAL REFLUX DISEASE WITH ESOPHAGITIS WITHOUT HEMORRHAGE: ICD-10-CM

## 2022-10-10 DIAGNOSIS — R10.11 RUQ ABDOMINAL PAIN: ICD-10-CM

## 2022-10-10 LAB
ALT SERPL W P-5'-P-CCNC: 15 U/L (ref 10–35)
AST SERPL W P-5'-P-CCNC: 24 U/L (ref 10–35)
CREAT SERPL-MCNC: 0.54 MG/DL (ref 0.51–0.95)
ERYTHROCYTE [DISTWIDTH] IN BLOOD BY AUTOMATED COUNT: 14.2 % (ref 10–15)
GFR SERPL CREATININE-BSD FRML MDRD: >90 ML/MIN/1.73M2
HCT VFR BLD AUTO: 37.5 % (ref 35–47)
HGB BLD-MCNC: 12.4 G/DL (ref 11.7–15.7)
MCH RBC QN AUTO: 30 PG (ref 26.5–33)
MCHC RBC AUTO-ENTMCNC: 33.1 G/DL (ref 31.5–36.5)
MCV RBC AUTO: 91 FL (ref 78–100)
PLATELET # BLD AUTO: 227 10E3/UL (ref 150–450)
RBC # BLD AUTO: 4.13 10E6/UL (ref 3.8–5.2)
WBC # BLD AUTO: 9.1 10E3/UL (ref 4–11)

## 2022-10-10 PROCEDURE — 82565 ASSAY OF CREATININE: CPT | Performed by: STUDENT IN AN ORGANIZED HEALTH CARE EDUCATION/TRAINING PROGRAM

## 2022-10-10 PROCEDURE — 84460 ALANINE AMINO (ALT) (SGPT): CPT | Performed by: STUDENT IN AN ORGANIZED HEALTH CARE EDUCATION/TRAINING PROGRAM

## 2022-10-10 PROCEDURE — 87653 STREP B DNA AMP PROBE: CPT | Performed by: STUDENT IN AN ORGANIZED HEALTH CARE EDUCATION/TRAINING PROGRAM

## 2022-10-10 PROCEDURE — 85027 COMPLETE CBC AUTOMATED: CPT | Performed by: STUDENT IN AN ORGANIZED HEALTH CARE EDUCATION/TRAINING PROGRAM

## 2022-10-10 PROCEDURE — 99207 PR PRENATAL VISIT: CPT | Performed by: STUDENT IN AN ORGANIZED HEALTH CARE EDUCATION/TRAINING PROGRAM

## 2022-10-10 PROCEDURE — 36415 COLL VENOUS BLD VENIPUNCTURE: CPT | Performed by: STUDENT IN AN ORGANIZED HEALTH CARE EDUCATION/TRAINING PROGRAM

## 2022-10-10 PROCEDURE — 84450 TRANSFERASE (AST) (SGOT): CPT | Performed by: STUDENT IN AN ORGANIZED HEALTH CARE EDUCATION/TRAINING PROGRAM

## 2022-10-10 NOTE — NURSING NOTE
"Initial /78 (BP Location: Left arm, Patient Position: Chair, Cuff Size: Adult Regular)   Pulse 101   Temp 98.2  F (36.8  C) (Tympanic)   Resp 18   Ht 1.6 m (5' 2.99\")   Wt 97.5 kg (215 lb)   LMP 01/30/2022   BMI 38.10 kg/m   Estimated body mass index is 38.1 kg/m  as calculated from the following:    Height as of this encounter: 1.6 m (5' 2.99\").    Weight as of this encounter: 97.5 kg (215 lb). .      "

## 2022-10-10 NOTE — PROGRESS NOTES
"[facility]  Return OB Note    Subjective:  Denies vaginal bleeding, loss of fluid, or regular contractions. Noted fetal movement.  Complaints today: acid reflux and patient has been taking omeprazole and Tums.    Objective:  /78 (BP Location: Left arm, Patient Position: Chair, Cuff Size: Adult Regular)   Pulse 101   Temp 98.2  F (36.8  C) (Tympanic)   Resp 18   Ht 1.6 m (5' 2.99\")   Wt 97.5 kg (215 lb)   LMP 2022   BMI 38.10 kg/m      Fundal height: 36cm  FHT: 140bpm  Bedside ultrasound: Cephalic presentation    Assessment/Plan:   Obdulia Black is a 37 year old  at 36w1d by LMP c/w 9w4d US, here for return OB visit.    Routine prenatal care:  - New OB labs on 2022: A+, antibody negative; hemoglobin  12.1, platelet 342; RPR/HIV/HepB/HepC NR, Rubella immune, chlamydia/gonorrhea neg; PAP smear NILM w/ neg HPV on 2022  - Dating US on 2022 at 9w4d  - Anatomy US on 6/10/2022 at 18w5d: normal except right club foot  - Genetic screen: declined  - Third trimester labs: GCT passed on 2022, RPR NR; GBS collected today  - Immunizations: Declined influenza and COVID-vaccine today, s/p Tdap vaccine on 2022    AMA: declines genetic testing, s/p L2US. Pt will not be over 41yo by the time of delivery. No  testing recommended at this time unless other indications.  Fetal right club foot:  - Detected on 6/10/2022 and 2022 on level 2 ultrasound  -  management often involves serial casting and gentle manipulation  anemia 10.4 on po iron  GERD: omeprazole  desires BTL if needing CS: Preferred one insurance with her , federal tubal paperwork is not indicated. Otherwise PP IUD  RUQ abdominal pain:  -No other preeclampsia symptoms, but blood pressures are creeping up.  Preeclampsia labs obtained again today.  Size>date during last visit: Growth ultrasound scheduled for 10/19    RTC 2 weeks    Kassidy Yi MD  Office phone: " [phone]  Obstetrics and Gynecology  [facility]   10/10/2022

## 2022-10-11 LAB — GP B STREP DNA SPEC QL NAA+PROBE: NEGATIVE

## 2022-10-18 ENCOUNTER — PRENATAL OFFICE VISIT (OUTPATIENT)
Dept: OBGYN | Facility: CLINIC | Age: 37
End: 2022-10-18
Payer: COMMERCIAL

## 2022-10-18 VITALS
TEMPERATURE: 98.2 F | DIASTOLIC BLOOD PRESSURE: 81 MMHG | WEIGHT: 218 LBS | HEIGHT: 63 IN | RESPIRATION RATE: 14 BRPM | SYSTOLIC BLOOD PRESSURE: 126 MMHG | BODY MASS INDEX: 38.62 KG/M2 | HEART RATE: 106 BPM

## 2022-10-18 DIAGNOSIS — O35.HXX0 CLUB FOOT OF FETUS AFFECTING ANTEPARTUM CARE OF MOTHER, SINGLE OR UNSPECIFIED FETUS: ICD-10-CM

## 2022-10-18 DIAGNOSIS — O09.523 MULTIGRAVIDA OF ADVANCED MATERNAL AGE IN THIRD TRIMESTER: ICD-10-CM

## 2022-10-18 DIAGNOSIS — Z34.83 PRENATAL CARE, SUBSEQUENT PREGNANCY IN THIRD TRIMESTER: Primary | ICD-10-CM

## 2022-10-18 PROCEDURE — 99207 PR PRENATAL VISIT: CPT | Performed by: OBSTETRICS & GYNECOLOGY

## 2022-10-18 NOTE — H&P (VIEW-ONLY)
"[facility]    Return OB Note    CC: Return OB     Subjective:  Obdulia is a 37 year old  at 37w2d   Denies vaginal bleeding, loss of fluid, or regular contractions. Good fetal movement.  Complaints today: More uncomfortable with advancing gestation    Objective:  /81 (BP Location: Left arm, Patient Position: Sitting, Cuff Size: Adult Large)   Pulse 106   Temp 98.2  F (36.8  C) (Tympanic)   Resp 14   Ht 1.6 m (5' 3\")   Wt 98.9 kg (218 lb)   LMP 2022   BMI 38.62 kg/m      Fundal height: 40cm  FHT: 140bpm  SVE: declined    Assessment/Plan:   Encounter Diagnoses   Name Primary?     Prenatal care, subsequent pregnancy in third trimester Yes     Club foot of fetus affecting antepartum care of mother, single or unspecified fetus      Multigravida of advanced maternal age in third trimester        IUP at 37w2d  -NOB labs normal  -Genetic screening: declined  -AMA: declined genetic screening. L2 with finding of R club foot, otherwise normal. Referral for pediatric ortho scheduled.   -Mid trimester labs nl   -GBS negative  -Wants tubal if c/s - private insurance under , no federal tubal papers needed  - S>D: OB US scheduled for tomorrow  -S/p TDap, declined flu and COVID vaccinations  -Strict return precautions given    RTC 1 weeks    Edith Valdez DO    "

## 2022-10-18 NOTE — PROGRESS NOTES
"[facility]    Return OB Note    CC: Return OB     Subjective:  Obdulia is a 37 year old  at 37w2d   Denies vaginal bleeding, loss of fluid, or regular contractions. Good fetal movement.  Complaints today: More uncomfortable with advancing gestation    Objective:  /81 (BP Location: Left arm, Patient Position: Sitting, Cuff Size: Adult Large)   Pulse 106   Temp 98.2  F (36.8  C) (Tympanic)   Resp 14   Ht 1.6 m (5' 3\")   Wt 98.9 kg (218 lb)   LMP 2022   BMI 38.62 kg/m      Fundal height: 40cm  FHT: 140bpm  SVE: declined    Assessment/Plan:   Encounter Diagnoses   Name Primary?     Prenatal care, subsequent pregnancy in third trimester Yes     Club foot of fetus affecting antepartum care of mother, single or unspecified fetus      Multigravida of advanced maternal age in third trimester        IUP at 37w2d  -NOB labs normal  -Genetic screening: declined  -AMA: declined genetic screening. L2 with finding of R club foot, otherwise normal. Referral for pediatric ortho scheduled.   -Mid trimester labs nl   -GBS negative  -Wants tubal if c/s - private insurance under , no federal tubal papers needed  - S>D: OB US scheduled for tomorrow  -S/p TDap, declined flu and COVID vaccinations  -Strict return precautions given    RTC 1 weeks    Edith Valdez DO    "

## 2022-10-18 NOTE — NURSING NOTE
"Initial /81 (BP Location: Left arm, Patient Position: Sitting, Cuff Size: Adult Large)   Pulse 106   Temp 98.2  F (36.8  C) (Tympanic)   Resp 14   Ht 1.6 m (5' 3\")   Wt 98.9 kg (218 lb)   LMP 01/30/2022   BMI 38.62 kg/m   Estimated body mass index is 38.62 kg/m  as calculated from the following:    Height as of this encounter: 1.6 m (5' 3\").    Weight as of this encounter: 98.9 kg (218 lb). .      "

## 2022-10-19 ENCOUNTER — HOSPITAL ENCOUNTER (OUTPATIENT)
Dept: ULTRASOUND IMAGING | Facility: CLINIC | Age: 37
Discharge: HOME | End: 2022-10-19
Attending: OBSTETRICS & GYNECOLOGY | Admitting: OBSTETRICS & GYNECOLOGY
Payer: COMMERCIAL

## 2022-10-19 DIAGNOSIS — O36.63X0 EXCESSIVE FETAL GROWTH AFFECTING MANAGEMENT OF PREGNANCY IN THIRD TRIMESTER, SINGLE OR UNSPECIFIED FETUS: ICD-10-CM

## 2022-10-19 PROCEDURE — 76816 OB US FOLLOW-UP PER FETUS: CPT

## 2022-10-21 ENCOUNTER — HOSPITAL ENCOUNTER (INPATIENT)
Facility: CLINIC | Age: 37
LOS: 1 days | Discharge: HOME | End: 2022-10-23
Attending: OBSTETRICS & GYNECOLOGY | Admitting: OBSTETRICS & GYNECOLOGY
Payer: COMMERCIAL

## 2022-10-21 PROCEDURE — G0463 HOSPITAL OUTPT CLINIC VISIT: HCPCS

## 2022-10-22 ENCOUNTER — ANESTHESIA EVENT (OUTPATIENT)
Dept: OBGYN | Facility: CLINIC | Age: 37
End: 2022-10-22
Payer: COMMERCIAL

## 2022-10-22 ENCOUNTER — ANESTHESIA (OUTPATIENT)
Dept: OBGYN | Facility: CLINIC | Age: 37
End: 2022-10-22
Payer: COMMERCIAL

## 2022-10-22 LAB
ABO/RH(D): NORMAL
ALBUMIN MFR UR ELPH: 90 MG/DL
ALT SERPL W P-5'-P-CCNC: 18 U/L (ref 10–35)
AMPHETAMINES UR QL SCN: ABNORMAL
ANTIBODY SCREEN: NEGATIVE
AST SERPL W P-5'-P-CCNC: 24 U/L (ref 10–35)
BARBITURATES UR QL SCN: ABNORMAL
BASOPHILS # BLD AUTO: 0 10E3/UL (ref 0–0.2)
BASOPHILS NFR BLD AUTO: 0 %
BENZODIAZ UR QL SCN: ABNORMAL
BZE UR QL SCN: ABNORMAL
CANNABINOIDS UR QL SCN: ABNORMAL
CREAT SERPL-MCNC: 0.55 MG/DL (ref 0.51–0.95)
CREAT UR-MCNC: 213.8 MG/DL
EOSINOPHIL # BLD AUTO: 0.1 10E3/UL (ref 0–0.7)
EOSINOPHIL NFR BLD AUTO: 1 %
ERYTHROCYTE [DISTWIDTH] IN BLOOD BY AUTOMATED COUNT: 13.9 % (ref 10–15)
GFR SERPL CREATININE-BSD FRML MDRD: >90 ML/MIN/1.73M2
HCT VFR BLD AUTO: 36.8 % (ref 35–47)
HGB BLD-MCNC: 12.6 G/DL (ref 11.7–15.7)
IMM GRANULOCYTES # BLD: 0.1 10E3/UL
IMM GRANULOCYTES NFR BLD: 1 %
LYMPHOCYTES # BLD AUTO: 1.9 10E3/UL (ref 0.8–5.3)
LYMPHOCYTES NFR BLD AUTO: 18 %
MCH RBC QN AUTO: 29.9 PG (ref 26.5–33)
MCHC RBC AUTO-ENTMCNC: 34.2 G/DL (ref 31.5–36.5)
MCV RBC AUTO: 87 FL (ref 78–100)
MONOCYTES # BLD AUTO: 0.7 10E3/UL (ref 0–1.3)
MONOCYTES NFR BLD AUTO: 7 %
NEUTROPHILS # BLD AUTO: 7.6 10E3/UL (ref 1.6–8.3)
NEUTROPHILS NFR BLD AUTO: 73 %
NRBC # BLD AUTO: 0 10E3/UL
NRBC BLD AUTO-RTO: 0 /100
OPIATES UR QL SCN: ABNORMAL
PCP QUAL URINE (ROCHE): ABNORMAL
PLATELET # BLD AUTO: 250 10E3/UL (ref 150–450)
PROT/CREAT 24H UR: 0.42 MG/MG CR (ref 0–0.2)
RBC # BLD AUTO: 4.22 10E6/UL (ref 3.8–5.2)
SARS-COV-2 RNA RESP QL NAA+PROBE: NEGATIVE
SPECIMEN EXPIRATION DATE: NORMAL
T PALLIDUM AB SER QL: NONREACTIVE
WBC # BLD AUTO: 10.4 10E3/UL (ref 4–11)

## 2022-10-22 PROCEDURE — 85004 AUTOMATED DIFF WBC COUNT: CPT | Performed by: OBSTETRICS & GYNECOLOGY

## 2022-10-22 PROCEDURE — 258N000003 HC RX IP 258 OP 636: Performed by: NURSE ANESTHETIST, CERTIFIED REGISTERED

## 2022-10-22 PROCEDURE — 250N000011 HC RX IP 250 OP 636: Performed by: NURSE ANESTHETIST, CERTIFIED REGISTERED

## 2022-10-22 PROCEDURE — 722N000001 HC LABOR CARE VAGINAL DELIVERY SINGLE

## 2022-10-22 PROCEDURE — 250N000009 HC RX 250: Performed by: NURSE ANESTHETIST, CERTIFIED REGISTERED

## 2022-10-22 PROCEDURE — 86780 TREPONEMA PALLIDUM: CPT | Performed by: OBSTETRICS & GYNECOLOGY

## 2022-10-22 PROCEDURE — 84156 ASSAY OF PROTEIN URINE: CPT | Performed by: OBSTETRICS & GYNECOLOGY

## 2022-10-22 PROCEDURE — 00HU33Z INSERTION OF INFUSION DEVICE INTO SPINAL CANAL, PERCUTANEOUS APPROACH: ICD-10-PCS | Performed by: OBSTETRICS & GYNECOLOGY

## 2022-10-22 PROCEDURE — 82565 ASSAY OF CREATININE: CPT | Performed by: OBSTETRICS & GYNECOLOGY

## 2022-10-22 PROCEDURE — 84450 TRANSFERASE (AST) (SGOT): CPT | Performed by: OBSTETRICS & GYNECOLOGY

## 2022-10-22 PROCEDURE — 59400 OBSTETRICAL CARE: CPT | Performed by: OBSTETRICS & GYNECOLOGY

## 2022-10-22 PROCEDURE — 84460 ALANINE AMINO (ALT) (SGPT): CPT | Performed by: OBSTETRICS & GYNECOLOGY

## 2022-10-22 PROCEDURE — 10907ZC DRAINAGE OF AMNIOTIC FLUID, THERAPEUTIC FROM PRODUCTS OF CONCEPTION, VIA NATURAL OR ARTIFICIAL OPENING: ICD-10-PCS | Performed by: OBSTETRICS & GYNECOLOGY

## 2022-10-22 PROCEDURE — 86901 BLOOD TYPING SEROLOGIC RH(D): CPT | Performed by: OBSTETRICS & GYNECOLOGY

## 2022-10-22 PROCEDURE — 80307 DRUG TEST PRSMV CHEM ANLYZR: CPT | Performed by: OBSTETRICS & GYNECOLOGY

## 2022-10-22 PROCEDURE — 87635 SARS-COV-2 COVID-19 AMP PRB: CPT | Performed by: OBSTETRICS & GYNECOLOGY

## 2022-10-22 PROCEDURE — 120N000001 HC R&B MED SURG/OB

## 2022-10-22 PROCEDURE — 10D17Z9 MANUAL EXTRACTION OF PRODUCTS OF CONCEPTION, RETAINED, VIA NATURAL OR ARTIFICIAL OPENING: ICD-10-PCS | Performed by: OBSTETRICS & GYNECOLOGY

## 2022-10-22 PROCEDURE — 250N000013 HC RX MED GY IP 250 OP 250 PS 637: Performed by: OBSTETRICS & GYNECOLOGY

## 2022-10-22 PROCEDURE — 3E0R3BZ INTRODUCTION OF ANESTHETIC AGENT INTO SPINAL CANAL, PERCUTANEOUS APPROACH: ICD-10-PCS | Performed by: OBSTETRICS & GYNECOLOGY

## 2022-10-22 PROCEDURE — 86850 RBC ANTIBODY SCREEN: CPT | Performed by: OBSTETRICS & GYNECOLOGY

## 2022-10-22 PROCEDURE — 370N000003 HC ANESTHESIA WARD SERVICE

## 2022-10-22 PROCEDURE — 0KQM0ZZ REPAIR PERINEUM MUSCLE, OPEN APPROACH: ICD-10-PCS | Performed by: OBSTETRICS & GYNECOLOGY

## 2022-10-22 RX ORDER — CARBOPROST TROMETHAMINE 250 UG/ML
250 INJECTION, SOLUTION INTRAMUSCULAR
Status: DISCONTINUED | OUTPATIENT
Start: 2022-10-22 | End: 2022-10-22 | Stop reason: HOSPADM

## 2022-10-22 RX ORDER — TRANEXAMIC ACID 10 MG/ML
1 INJECTION, SOLUTION INTRAVENOUS EVERY 30 MIN PRN
Status: DISCONTINUED | OUTPATIENT
Start: 2022-10-22 | End: 2022-10-22 | Stop reason: HOSPADM

## 2022-10-22 RX ORDER — MODIFIED LANOLIN
OINTMENT (GRAM) TOPICAL
Status: DISCONTINUED | OUTPATIENT
Start: 2022-10-22 | End: 2022-10-23 | Stop reason: HOSPADM

## 2022-10-22 RX ORDER — METHYLERGONOVINE MALEATE 0.2 MG/ML
200 INJECTION INTRAVENOUS
Status: DISCONTINUED | OUTPATIENT
Start: 2022-10-22 | End: 2022-10-22 | Stop reason: HOSPADM

## 2022-10-22 RX ORDER — CARBOPROST TROMETHAMINE 250 UG/ML
250 INJECTION, SOLUTION INTRAMUSCULAR
Status: DISCONTINUED | OUTPATIENT
Start: 2022-10-22 | End: 2022-10-23 | Stop reason: HOSPADM

## 2022-10-22 RX ORDER — KETOROLAC TROMETHAMINE 30 MG/ML
30 INJECTION, SOLUTION INTRAMUSCULAR; INTRAVENOUS
Status: DISCONTINUED | OUTPATIENT
Start: 2022-10-22 | End: 2022-10-23 | Stop reason: HOSPADM

## 2022-10-22 RX ORDER — ONDANSETRON 4 MG/1
4 TABLET, ORALLY DISINTEGRATING ORAL EVERY 6 HOURS PRN
Status: DISCONTINUED | OUTPATIENT
Start: 2022-10-22 | End: 2022-10-22 | Stop reason: HOSPADM

## 2022-10-22 RX ORDER — FLUTICASONE PROPIONATE 50 MCG
2 SPRAY, SUSPENSION (ML) NASAL DAILY
Status: CANCELLED | OUTPATIENT
Start: 2022-10-22

## 2022-10-22 RX ORDER — CITRIC ACID/SODIUM CITRATE 334-500MG
30 SOLUTION, ORAL ORAL
Status: DISCONTINUED | OUTPATIENT
Start: 2022-10-22 | End: 2022-10-22 | Stop reason: HOSPADM

## 2022-10-22 RX ORDER — LIDOCAINE HYDROCHLORIDE AND EPINEPHRINE 15; 5 MG/ML; UG/ML
INJECTION, SOLUTION EPIDURAL PRN
Status: DISCONTINUED | OUTPATIENT
Start: 2022-10-22 | End: 2022-10-22

## 2022-10-22 RX ORDER — OXYTOCIN/0.9 % SODIUM CHLORIDE 30/500 ML
100-340 PLASTIC BAG, INJECTION (ML) INTRAVENOUS CONTINUOUS PRN
Status: DISCONTINUED | OUTPATIENT
Start: 2022-10-22 | End: 2022-10-23 | Stop reason: HOSPADM

## 2022-10-22 RX ORDER — MISOPROSTOL 200 UG/1
800 TABLET ORAL
Status: DISCONTINUED | OUTPATIENT
Start: 2022-10-22 | End: 2022-10-22 | Stop reason: HOSPADM

## 2022-10-22 RX ORDER — OXYTOCIN 10 [USP'U]/ML
10 INJECTION, SOLUTION INTRAMUSCULAR; INTRAVENOUS
Status: DISCONTINUED | OUTPATIENT
Start: 2022-10-22 | End: 2022-10-23 | Stop reason: HOSPADM

## 2022-10-22 RX ORDER — OXYTOCIN/0.9 % SODIUM CHLORIDE 30/500 ML
340 PLASTIC BAG, INJECTION (ML) INTRAVENOUS CONTINUOUS PRN
Status: DISCONTINUED | OUTPATIENT
Start: 2022-10-22 | End: 2022-10-22 | Stop reason: HOSPADM

## 2022-10-22 RX ORDER — IBUPROFEN 800 MG/1
800 TABLET, FILM COATED ORAL EVERY 6 HOURS PRN
Status: DISCONTINUED | OUTPATIENT
Start: 2022-10-22 | End: 2022-10-23 | Stop reason: HOSPADM

## 2022-10-22 RX ORDER — LIDOCAINE 40 MG/G
CREAM TOPICAL
Status: DISCONTINUED | OUTPATIENT
Start: 2022-10-22 | End: 2022-10-22 | Stop reason: HOSPADM

## 2022-10-22 RX ORDER — METOCLOPRAMIDE 10 MG/1
10 TABLET ORAL EVERY 6 HOURS PRN
Status: DISCONTINUED | OUTPATIENT
Start: 2022-10-22 | End: 2022-10-22 | Stop reason: HOSPADM

## 2022-10-22 RX ORDER — MISOPROSTOL 200 UG/1
400 TABLET ORAL
Status: DISCONTINUED | OUTPATIENT
Start: 2022-10-22 | End: 2022-10-23 | Stop reason: HOSPADM

## 2022-10-22 RX ORDER — MISOPROSTOL 200 UG/1
400 TABLET ORAL
Status: DISCONTINUED | OUTPATIENT
Start: 2022-10-22 | End: 2022-10-22 | Stop reason: HOSPADM

## 2022-10-22 RX ORDER — OXYTOCIN/0.9 % SODIUM CHLORIDE 30/500 ML
340 PLASTIC BAG, INJECTION (ML) INTRAVENOUS CONTINUOUS PRN
Status: DISCONTINUED | OUTPATIENT
Start: 2022-10-22 | End: 2022-10-23 | Stop reason: HOSPADM

## 2022-10-22 RX ORDER — OXYTOCIN 10 [USP'U]/ML
10 INJECTION, SOLUTION INTRAMUSCULAR; INTRAVENOUS
Status: DISCONTINUED | OUTPATIENT
Start: 2022-10-22 | End: 2022-10-22 | Stop reason: HOSPADM

## 2022-10-22 RX ORDER — METHYLERGONOVINE MALEATE 0.2 MG/ML
200 INJECTION INTRAVENOUS
Status: DISCONTINUED | OUTPATIENT
Start: 2022-10-22 | End: 2022-10-23 | Stop reason: HOSPADM

## 2022-10-22 RX ORDER — NALOXONE HYDROCHLORIDE 0.4 MG/ML
0.2 INJECTION, SOLUTION INTRAMUSCULAR; INTRAVENOUS; SUBCUTANEOUS
Status: DISCONTINUED | OUTPATIENT
Start: 2022-10-22 | End: 2022-10-22 | Stop reason: HOSPADM

## 2022-10-22 RX ORDER — METOCLOPRAMIDE HYDROCHLORIDE 5 MG/ML
10 INJECTION INTRAMUSCULAR; INTRAVENOUS EVERY 6 HOURS PRN
Status: DISCONTINUED | OUTPATIENT
Start: 2022-10-22 | End: 2022-10-22 | Stop reason: HOSPADM

## 2022-10-22 RX ORDER — NALBUPHINE HYDROCHLORIDE 10 MG/ML
2.5-5 INJECTION, SOLUTION INTRAMUSCULAR; INTRAVENOUS; SUBCUTANEOUS EVERY 6 HOURS PRN
Status: DISCONTINUED | OUTPATIENT
Start: 2022-10-22 | End: 2022-10-23 | Stop reason: HOSPADM

## 2022-10-22 RX ORDER — SODIUM CHLORIDE, SODIUM LACTATE, POTASSIUM CHLORIDE, CALCIUM CHLORIDE 600; 310; 30; 20 MG/100ML; MG/100ML; MG/100ML; MG/100ML
INJECTION, SOLUTION INTRAVENOUS CONTINUOUS
Status: DISCONTINUED | OUTPATIENT
Start: 2022-10-22 | End: 2022-10-22 | Stop reason: HOSPADM

## 2022-10-22 RX ORDER — DOCUSATE SODIUM 100 MG/1
100 CAPSULE, LIQUID FILLED ORAL DAILY
Status: DISCONTINUED | OUTPATIENT
Start: 2022-10-22 | End: 2022-10-23 | Stop reason: HOSPADM

## 2022-10-22 RX ORDER — MISOPROSTOL 200 UG/1
800 TABLET ORAL
Status: DISCONTINUED | OUTPATIENT
Start: 2022-10-22 | End: 2022-10-23 | Stop reason: HOSPADM

## 2022-10-22 RX ORDER — IBUPROFEN 800 MG/1
800 TABLET, FILM COATED ORAL
Status: DISCONTINUED | OUTPATIENT
Start: 2022-10-22 | End: 2022-10-23 | Stop reason: HOSPADM

## 2022-10-22 RX ORDER — EPHEDRINE SULFATE 50 MG/ML
5 INJECTION, SOLUTION INTRAMUSCULAR; INTRAVENOUS; SUBCUTANEOUS
Status: DISCONTINUED | OUTPATIENT
Start: 2022-10-22 | End: 2022-10-22 | Stop reason: HOSPADM

## 2022-10-22 RX ORDER — TRANEXAMIC ACID 10 MG/ML
1 INJECTION, SOLUTION INTRAVENOUS EVERY 30 MIN PRN
Status: DISCONTINUED | OUTPATIENT
Start: 2022-10-22 | End: 2022-10-23 | Stop reason: HOSPADM

## 2022-10-22 RX ORDER — ACETAMINOPHEN 325 MG/1
650 TABLET ORAL EVERY 4 HOURS PRN
Status: DISCONTINUED | OUTPATIENT
Start: 2022-10-22 | End: 2022-10-23 | Stop reason: HOSPADM

## 2022-10-22 RX ORDER — NALOXONE HYDROCHLORIDE 0.4 MG/ML
0.4 INJECTION, SOLUTION INTRAMUSCULAR; INTRAVENOUS; SUBCUTANEOUS
Status: DISCONTINUED | OUTPATIENT
Start: 2022-10-22 | End: 2022-10-22 | Stop reason: HOSPADM

## 2022-10-22 RX ORDER — PROCHLORPERAZINE 25 MG
25 SUPPOSITORY, RECTAL RECTAL EVERY 12 HOURS PRN
Status: DISCONTINUED | OUTPATIENT
Start: 2022-10-22 | End: 2022-10-22 | Stop reason: HOSPADM

## 2022-10-22 RX ORDER — HYDROCORTISONE 25 MG/G
CREAM TOPICAL 3 TIMES DAILY PRN
Status: DISCONTINUED | OUTPATIENT
Start: 2022-10-22 | End: 2022-10-23 | Stop reason: HOSPADM

## 2022-10-22 RX ORDER — ONDANSETRON 2 MG/ML
4 INJECTION INTRAMUSCULAR; INTRAVENOUS EVERY 6 HOURS PRN
Status: DISCONTINUED | OUTPATIENT
Start: 2022-10-22 | End: 2022-10-22 | Stop reason: HOSPADM

## 2022-10-22 RX ORDER — LIDOCAINE HYDROCHLORIDE 10 MG/ML
INJECTION, SOLUTION EPIDURAL; INFILTRATION; INTRACAUDAL; PERINEURAL
Status: DISCONTINUED
Start: 2022-10-22 | End: 2022-10-22 | Stop reason: HOSPADM

## 2022-10-22 RX ORDER — PROCHLORPERAZINE MALEATE 10 MG
10 TABLET ORAL EVERY 6 HOURS PRN
Status: DISCONTINUED | OUTPATIENT
Start: 2022-10-22 | End: 2022-10-22 | Stop reason: HOSPADM

## 2022-10-22 RX ORDER — BISACODYL 10 MG
10 SUPPOSITORY, RECTAL RECTAL DAILY PRN
Status: DISCONTINUED | OUTPATIENT
Start: 2022-10-22 | End: 2022-10-23 | Stop reason: HOSPADM

## 2022-10-22 RX ADMIN — IBUPROFEN 800 MG: 800 TABLET ORAL at 19:10

## 2022-10-22 RX ADMIN — DEXMEDETOMIDINE HYDROCHLORIDE 25 MCG: 100 INJECTION, SOLUTION INTRAVENOUS at 01:15

## 2022-10-22 RX ADMIN — ACETAMINOPHEN 650 MG: 325 TABLET, FILM COATED ORAL at 17:28

## 2022-10-22 RX ADMIN — IBUPROFEN 800 MG: 800 TABLET ORAL at 07:18

## 2022-10-22 RX ADMIN — DOCUSATE SODIUM 100 MG: 100 CAPSULE, LIQUID FILLED ORAL at 07:35

## 2022-10-22 RX ADMIN — Medication 5 MG: at 02:45

## 2022-10-22 RX ADMIN — SODIUM CHLORIDE, POTASSIUM CHLORIDE, SODIUM LACTATE AND CALCIUM CHLORIDE 250 ML: 600; 310; 30; 20 INJECTION, SOLUTION INTRAVENOUS at 02:51

## 2022-10-22 RX ADMIN — Medication: at 00:56

## 2022-10-22 RX ADMIN — LIDOCAINE HYDROCHLORIDE AND EPINEPHRINE 3 ML: 15; 5 INJECTION, SOLUTION EPIDURAL at 00:52

## 2022-10-22 ASSESSMENT — ACTIVITIES OF DAILY LIVING (ADL)
WALKING_OR_CLIMBING_STAIRS_DIFFICULTY: NO
ADLS_ACUITY_SCORE: 31
ADLS_ACUITY_SCORE: 18
DIFFICULTY_EATING/SWALLOWING: NO
ADLS_ACUITY_SCORE: 31
DRESSING/BATHING_DIFFICULTY: NO
DOING_ERRANDS_INDEPENDENTLY_DIFFICULTY: NO
FALL_HISTORY_WITHIN_LAST_SIX_MONTHS: NO
CHANGE_IN_FUNCTIONAL_STATUS_SINCE_ONSET_OF_CURRENT_ILLNESS/INJURY: NO
CONCENTRATING,_REMEMBERING_OR_MAKING_DECISIONS_DIFFICULTY: NO
TOILETING_ISSUES: NO
ADLS_ACUITY_SCORE: 18
WEAR_GLASSES_OR_BLIND: NO

## 2022-10-22 ASSESSMENT — LIFESTYLE VARIABLES: TOBACCO_USE: 1

## 2022-10-22 NOTE — L&D DELIVERY NOTE
Obdulia Black is a 37 year old  presented at 37w6d in labor.  She progressed spontaneously through labor other than AROM until complete dilation. Patient did not labor down. She pushed effectively and delivered a viable male infant with Apgars pending. Weight is 6 lbs 13 oz and skin-to-skin was performed. Delayed cord clamping was briefly performed but abandoned after about 10-15 seconds given poor tone. Peds MD was present at delivery given Cat 2 FHT and possible meconium stained fluid. Baby handed off immediately to awaiting MD. 2nd degree laceration was repaired with 3-0 vicryl in standard fashion after infiltrating with 1% lidocaine. Placenta did not immediately deliver and cord avulsed with gentle traction at about 20 min PP. It was manually removed and sweep after removal did not reveal any retained portions. QBL 400ml. Sponge and needle counts correct. Mom and baby were transported to postpartum in stable condition.       Alia Chen MD   10/22/2022 2:45 AM     OB Vaginal Delivery Note    Obdulia Black MRN# [id number]   Age: 37 year old YOB: 1985       GA: 37w6d  GP:   Labor Complications: None   EBL:   mL  Delivery QBL:    Delivery Type: Vaginal, Spontaneous   ROM to Delivery Time: rupture date or rupture time have not been documented   Weight:     1 Minute 5 Minute 10 Minute   Apgar Totals:            ZE CARRINGTON;MAREN BALLESTEROS;ALIA CHEN     Delivery Details:  Obdulia Black, a 37 year old  female delivered a viable infant with apgars of   and  . Patient was fully dilated and pushing after   hours   minutes in active labor. Delivery was via vaginal, spontaneous  to a sterile field under epidural  anesthesia. Infant delivered in         position. Anterior and posterior shoulders delivered without difficulty. The cord was clamped, cut twice and   were noted. Cord blood was obtained in routine fashion with the following  disposition:  .      Cord complications: nuchal   Placenta delivered at 10/22/2022  2:31 AM . Placental disposition was Hospital disposal . Fundal massage performed and fundus found to be firm.     Episiotomy: none    Perineum, vagina, cervix were inspected, and the following lacerations were noted:   Perineal lacerations: none;2nd                Any lacerations were repaired in the usual fashion using 3-0 vicryl    Excellent hemostasis was noted. Needle count correct. Infant and patient in delivery room in good and stable condition.        Wayne Sukia [[id number]]    Labor Event Times    Labor onset date: 10/21/22 Onset time:  7:00 PM   Dilation complete date: 10/22/22 Complete time:  1:13 AM   Start pushing date/time: 10/22/2022 0113      Labor Events     labor?: No   steroids: None  Labor Type: Spontaneous  Predominate monitoring during 1st stage: continuous electronic fetal monitoring     Augmentation: AROM  Indications for augmentation: Ineffective Contraction Pattern  1:1 continuous labor support provided by?: RN       Delivery/Placenta Date and Time    Delivery Date: 10/22/22 Delivery Time:  2:09 AM   Placenta Date/Time: 10/22/2022  2:31 AM  Delivering clinician: Alia Chen MD   Other personnel present at delivery:  Provider Role   Joie Rascon, LIAM Charge Nurse   Christianne Rodarte RN Delivery Nurse   Alia Chen MD Obstetrician         Vaginal Counts     Initial count performed by 2 team members:  Two Team Members   abdoul rascon l       Needles Suture Needles Sponges (RETIRED) Instruments   Initial counts 2  5    Added to count 1      Relief counts       Final counts             Placed during labor Accounted for at the end of labor   FSE     IUPC     Cervidil NA                      Apgars    Living status: Living   1 Minute 5 Minute 10 Minute 15 Minute 20 Minute   Skin color:        Heart rate:        Reflex irritability:         Muscle tone:        Respiratory effort:        Total:        Apgars assigned by: JOSÉ MANUEL RODARTE RN     Cord    Cord Complications: Nuchal   Nuchal Intervention: reduced         Nuchal cord description: tight nuchal cord            Labor Events and Shoulder Dystocia    Fetal Tracing Prior to Delivery: Category 2  Shoulder dystocia present?: Neg     Delivery (Maternal) (Provider to Complete) ([id number])    Episiotomy: None  Perineal lacerations: None, 2nd Repaired?: Yes   Repair suture: 3-0 Vicryl  Genital tract inspection done: Pos     Blood Loss  Mother: Obdulia Black #[id number]   Start of Mother's Information    Delivery Blood Loss  10/21/22 1900 - 10/22/22 0243    None           End of Mother's Information  Mother: Obdulia Black #[id number]          Delivery - Provider to Complete ([id number])    Delivering clinician: Alia Chen MD  Attempted Delivery Types (Choose all that apply): Spontaneous Vaginal Delivery  Delivery Type (Choose the 1 that will go to the Birth History): Vaginal, Spontaneous                   Other personnel:  Provider Role   Joie Smith RN Charge Nurse   Christianne Rodarte RN Delivery Nurse   Alia Chen MD Obstetrician                Placenta    Date/Time: 10/22/2022  2:31 AM  Removal: Manual Removal  Comments: cord avulsed at ~20 min PP   Disposition: Hospital disposal           Anesthesia    Method: Epidural  Cervical dilation at placement: 4-7                       Alia Chen MD

## 2022-10-22 NOTE — ANESTHESIA POSTPROCEDURE EVALUATION
Patient: Obdulia Black    Procedure: * No procedures listed *       Anesthesia Type:  Epidural    Note:  Disposition: Outpatient   Postop Pain Control: Uneventful            Sign Out: Well controlled pain   PONV: No   Neuro/Psych: Uneventful            Sign Out: Acceptable/Baseline neuro status   Airway/Respiratory: Uneventful            Sign Out: Acceptable/Baseline resp. status   CV/Hemodynamics: Uneventful            Sign Out: Acceptable CV status; No obvious hypovolemia; No obvious fluid overload   Other NRE: NONE   DID A NON-ROUTINE EVENT OCCUR? No           Last vitals:  Vitals:    10/22/22 0330 10/22/22 0345 10/22/22 0400   BP: 108/67 119/69 134/82       Electronically Signed By: NICOLA Jurado CRNA  October 22, 2022  6:58 AM

## 2022-10-22 NOTE — H&P
[facility] Labor and Delivery H&P  2022  Obdulia Black  [id number]      HPI: Obdulia Black is a 37 year old  at 37w6d who presents for labor. Contractions started at 1900. No LOF, VB. +FM.  She denies fever, HA, scotoma, nausea, vomiting, CP, SOB, RUQ pain, constipation, diarrhea, and acute swelling.        Pregnancy notable for:  --AMA  --baby with club foot  --anemia  --desires BTL if CS    OBHX:   OB History    Para Term  AB Living   2 1 1 0 0 1   SAB IAB Ectopic Multiple Live Births   0 0 0 0 1      # Outcome Date GA Lbr Terrance/2nd Weight Sex Delivery Anes PTL Lv   2 Current            1 Term 16 39w3d 21:15 / 03:36 3.011 kg (6 lb 10.2 oz) F Vag-Spont EPI, Local  ANGELA      Name: Catia      Apgar1: 7  Apgar5: 8       MedicalHX:   Past Medical History:   Diagnosis Date     Chickenpox      Depression      Moderate persistent asthma        SurgicalHX:   Past Surgical History:   Procedure Laterality Date     BIOPSY       ENDOSCOPIC BALLOON SINUPLASTY ACCLARENT Bilateral 2/3/2022    Procedure: ENDOSCOPIC EUSTACHAIN TUBE BALLOON DILATION;  Surgeon: Maxx Feliciano MD;  Location: [facility] OR     MOUTH SURGERY      wisdom teeth x 4     MYRINGOTOMY, INSERT TUBE BILATERAL, COMBINED Bilateral 2/3/2022    Procedure: MYRINGOTOMY, BILATERAL, WITH VENTILATION TUBE INSERTION;  Surgeon: Maxx Feliciano MD;  Location: [facility] OR     PE TUBES  3/02/92     SURGICAL HISTORY OF -   03    Excision of cyst on left middle finger     TONSILLECTOMY & ADENOIDECTOMY  1991       Medications:   No current facility-administered medications on file prior to encounter.  cetirizine (ZYRTEC) 10 MG tablet, Take 10 mg by mouth daily PRN  ferrous sulfate (FEROSUL) 325 (65 Fe) MG tablet, Take 325 mg by mouth daily (with breakfast)  fluticasone (FLONASE) 50 MCG/ACT nasal spray, Spray 2 sprays in nostril daily  omeprazole (PRILOSEC) 40 MG DR capsule, Take 1 capsule (40 mg) by mouth daily  Prenatal Vit-Fe  Fumarate-FA (PRENATAL MULTIVITAMIN W/IRON) 27-0.8 MG tablet, Take 1 tablet by mouth daily  sertraline (ZOLOFT) 50 MG tablet, Take 50 mg by mouth daily  miconazole (MICATIN) 2 % external powder, Apply topically daily as needed for itching Under breasts. (Patient not taking: No sig reported)  nystatin (MYCOSTATIN) 892670 UNIT/GM external powder, Apply topically daily as needed for itching Under breasts (Patient not taking: No sig reported)  ondansetron (ZOFRAN) 4 MG tablet, Take 1 tablet (4 mg) by mouth every 8 hours as needed for nausea (Patient not taking: Reported on 10/18/2022)        Allergies:  Allergies   Allergen Reactions     Sulfa Drugs Itching and Swelling       FamilyHX:  Family History   Problem Relation Age of Onset     Anemia Mother      Depression Mother      Anxiety Disorder Mother      Hypertension Father      Rheumatoid Arthritis Father      Hypertension Maternal Grandmother      Other Cancer Maternal Grandmother         leukemia     Gastrointestinal Disease Maternal Grandmother         diverticulitis     Thyroid Disease Maternal Grandmother      Diabetes Maternal Grandfather      Cerebrovascular Disease Maternal Grandfather      Heart Disease Maternal Grandfather         MI- open heart surgery     Eye Disorder Maternal Grandfather      Depression Maternal Grandfather      Cancer Paternal Grandmother         leukemia     Prostate Cancer Paternal Grandfather      Alzheimer Disease Paternal Grandfather      Substance Abuse Paternal Grandfather         alcohol     Substance Abuse Brother         prescription pills     Asthma Sister         Half sister     Diabetes Maternal Uncle      Lupus Other        SocialHX:   Social History     Socioeconomic History     Marital status:    Tobacco Use     Smoking status: Former     Packs/day: 0.00     Years: 10.00     Pack years: 0.00     Types: Cigarettes     Quit date: 2008     Years since quittin.4     Smokeless tobacco: Never     Tobacco  comments:     0   Vaping Use     Vaping Use: Never used   Substance and Sexual Activity     Alcohol use: Not Currently     Comment: rare     Drug use: Not Currently     Types: Marijuana     Comment: last use 2021     Sexual activity: Yes     Partners: Male     Comment:     Other Topics Concern     Parent/sibling w/ CABG, MI or angioplasty before 65F 55M? Yes       ROS: 10-point ROS negative except as in HPI     Physical Exam:  LMP 01/30/2022    GEN: resting comfortably in bed, NAD   CV: RRR, no murmurs  PULM: CTAB, no increased work of breathing, no cough/wheeze   ABD: soft, gravid, non-tender, non-distended  EXT: trace edema, non tender to palpation  CVX: 6 per RN    NST:  FHT: baseline 140s, moderate variability, + accels, no decels  TOCO: q4-5    Labs:   CBC RPR T&S   Lab Results   Component Value Date    ABO A 01/05/2016    RH  Pos 01/05/2016    AS Negative 04/05/2022    HEPBANG Nonreactive 04/05/2022    CHPCRT  03/01/2013     Negative for C. trachomatis rRNA by transcription mediated amplification.   A negative result by transcription mediated amplification does not preclude the   presence of C. trachomatis infection because results are dependent on proper   and adequate collection, absence of inhibitors, and sufficient rRNA to be   detected.    GCPCRT  12/31/2007     Negative for N. gonorrhoeae rRNA by transcription mediated amplification.   A negative result by transcription mediated amplification does not preclude the   presence of N. gonorrhoeae infection because results are dependent on proper   and adequate collection, absence of inhibitors, and sufficient rRNA to be   detected.    TREPAB Negative 01/05/2016    HGB 12.6 10/22/2022    HIV Negative 12/15/2006       GBS Status:   Lab Results   Component Value Date    GBS  07/14/2016     Negative  No GBS DNA detected, presumed negative for GBS or number of bacteria may be   below the limit of detection of the assay.   Assay performed on incubated broth  culture of specimen using [employer] real-time   PCR.         Lab Results   Component Value Date    PAP NIL 2020       A/P: Obdulia Black is a 37 year old female  at 37w6d here in labor.     Admit to L&D. Place PIV. Draw labs: T&S, CBC, RPR and HELLP labs and covid swab.  Labor: Anticipate   FWB: Category 1 FHT.  Continue EFM and toco  Pain: S/p analgesia  PNC: Rh +, Rubella imm, GBS neg    Alia Chen MD   OB/GYN   10/22/2022 1:03 AM

## 2022-10-22 NOTE — INTERVAL H&P NOTE
I have reviewed the surgical (or preoperative) H&P that is linked to this encounter, and examined the patient. There are no significant changes    Clinical Conditions Present on Arrival:  Clinically Significant Risk Factors Present on Admission           # Hyponatremia: Lowest Na = 135 mmol/L (Ref range: 136-145) in last 30 days, will monitor as appropriate      # Hypoalbuminemia: Lowest albumin = 3.4 g/dL (Ref range: 3.5-5.2) in the past 30 days , will monitor as appropriate

## 2022-10-22 NOTE — PROGRESS NOTES
Pt vss, afebrile.  Small amt vaginal bleeding.  Taking Ibup & tylenol for discomfort.  Pumping.  Pt stable.

## 2022-10-22 NOTE — PLAN OF CARE
S:Delivery  B:Spontaneous Labor,37w6d    Lab Results   Component Value Date    GBS  2016     Negative  No GBS DNA detected, presumed negative for GBS or number of bacteria may be   below the limit of detection of the assay.   Assay performed on incubated broth culture of specimen using [employer] real-time   PCR.      with antibiotic treatment not indicated 4 hours prior to delivery.  A: Patient delivered   lac 2nd degree at 0209 with Dr. GERA Chen in attendance and baby placed on mother's abdomen for immediate cord clamping. Baby to warmer for assessment/resusitative efforts. Apgars 6/7/9.Delivery .  IV infusion of Oxytocin  infused. Placenta removal spontaneous. MD does not want placenta sent to pathology.  See Flowsheet for VS and PP checks. Labor care plan goals met, transition now to postpartum care.   R: Expect routine postpartum care. Anticipate first feeding within the hour or whenever infant displays feeding cues. Continue skin to skin. Prior discussion with mother indicates that feeding plan is Breast feeding . Educated mother on importance of exclusive breastfeeding, expected feeding readiness cues and encouraged her to observe for these cues while rooming in. Informed her that breastfeeding assistance would be provided.

## 2022-10-22 NOTE — PLAN OF CARE
2322: arrived to triage 2 via wheelchair with reports of contractions occurring since 1900 every 5 minutes  2335: SVE 2-3 / 70-80% / -2 , ron every 2-3 minutes   0000: admitted to room 53  0010: fluid bolus started for epidural  0015: SVE 6 / 90% / -2  0030: anesthesia at bedside  0045: epidural placed  0108: SVE 8-9, AROM  0111: SVE cervical lip present  0113: SVE complete, started pushing  0120 : fluid bolus started and placed on O2 for fetal heart rate in 80s  0209: delivery

## 2022-10-22 NOTE — CONSULTS
Addendum:  Received call from Natalie Kumar, JLUEE [phone] regarding Pt is positive for THC and methamphetamine.  Natalie would like to know if 72 hour hold on Diaz Black is necessary.    Spoke with Patt Hollins, [facility] P) [phone] F) [phone].  Per Patt according to [facility] a 72 Hour Hold is not necessary.  Law Enforcement should be notified if parents are acting abnormal or baby is in withdrawal.  Per Julita Ibarra [facility] RN, parents are acting within normal limits and baby is not in withdrawal.  Per [place] policy, mandated child protection report was made to Patt Hollins [facility] P) [phone] F) [phone] for Patient due to positive drug screen for THC and amphetamines.  Faxed all requested documents to [facility].    Care Management will follow cord tissue results for diaz Black.            Care Management Note:    Care Management staff received notification from [facility] RN informing that a cord tissue segment was sent for drug detection panel based on drug screen positive for THC and methamphetamine.    Care Management to follow for results.      Kimberly Guzmán RN, Care Coordinator [phone]

## 2022-10-22 NOTE — ANESTHESIA PREPROCEDURE EVALUATION
Anesthesia Pre-Procedure Evaluation    Patient: Obdulia Black   MRN: [id number] : 1985        Procedure :           Past Medical History:   Diagnosis Date     Chickenpox      Depression      Moderate persistent asthma       Past Surgical History:   Procedure Laterality Date     BIOPSY  2002     ENDOSCOPIC BALLOON SINUPLASTY ACCLARENT Bilateral 2/3/2022    Procedure: ENDOSCOPIC EUSTACHAIN TUBE BALLOON DILATION;  Surgeon: Maxx Feliciano MD;  Location: [facility] OR     MOUTH SURGERY      wisdom teeth x 4     MYRINGOTOMY, INSERT TUBE BILATERAL, COMBINED Bilateral 2/3/2022    Procedure: MYRINGOTOMY, BILATERAL, WITH VENTILATION TUBE INSERTION;  Surgeon: Maxx Feliciano MD;  Location: [facility] OR     PE TUBES  3/02/92     SURGICAL HISTORY OF -   03    Excision of cyst on left middle finger     TONSILLECTOMY & ADENOIDECTOMY  1991      Allergies   Allergen Reactions     Sulfa Drugs Itching and Swelling      Social History     Tobacco Use     Smoking status: Former     Packs/day: 0.00     Years: 10.00     Pack years: 0.00     Types: Cigarettes     Quit date: 2008     Years since quittin.4     Smokeless tobacco: Never     Tobacco comments:     0   Substance Use Topics     Alcohol use: Not Currently     Comment: rare      Wt Readings from Last 1 Encounters:   10/18/22 98.9 kg (218 lb)        Anesthesia Evaluation   Pt has had prior anesthetic. Type: General.    No history of anesthetic complications       ROS/MED HX  ENT/Pulmonary:     (+) allergic rhinitis, tobacco use, Past use,     Neurologic:       Cardiovascular:  - neg cardiovascular ROS     METS/Exercise Tolerance: >4 METS    Hematologic:  - neg hematologic  ROS     Musculoskeletal:       GI/Hepatic:     (+) GERD,     Renal/Genitourinary:       Endo:  - neg endo ROS     Psychiatric/Substance Use:  - neg psychiatric ROS     Infectious Disease:       Malignancy:       Other:     (-) previous        Physical Exam    Airway        Mallampati: II   TM  distance: > 3 FB   Neck ROM: full   Mouth opening: > 3 cm    Respiratory Devices and Support         Dental  no notable dental history         Cardiovascular   cardiovascular exam normal          Pulmonary   pulmonary exam normal                OUTSIDE LABS:  CBC:   Lab Results   Component Value Date    WBC 9.1 10/10/2022    WBC 10.3 10/05/2022    HGB 12.4 10/10/2022    HGB 12.8 10/05/2022    HCT 37.5 10/10/2022    HCT 38.7 10/05/2022     10/10/2022     10/05/2022     BMP:   Lab Results   Component Value Date     (L) 10/05/2022     01/27/2022    POTASSIUM 3.9 10/05/2022    POTASSIUM 4.5 01/27/2022    CHLORIDE 100 10/05/2022    CHLORIDE 106 01/27/2022    CO2 22 10/05/2022    CO2 27 01/27/2022    BUN 7.0 10/05/2022    BUN 13 01/27/2022    CR 0.54 10/10/2022    CR 0.60 10/05/2022     (H) 10/05/2022     (H) 01/27/2022     COAGS: No results found for: PTT, INR, FIBR  POC:   Lab Results   Component Value Date    HCG Negative 01/27/2022     HEPATIC:   Lab Results   Component Value Date    ALBUMIN 3.4 (L) 10/05/2022    PROTTOTAL 6.2 (L) 10/05/2022    ALT 15 10/10/2022    AST 24 10/10/2022    ALKPHOS 203 (H) 10/05/2022    BILITOTAL 0.2 10/05/2022     OTHER:   Lab Results   Component Value Date    ROBBI 8.8 10/05/2022    LIPASE 23 10/05/2022    AMYLASE 35 10/05/2022    CRP 3.8 07/14/2020    SED 7 07/14/2020       Anesthesia Plan    ASA Status:  2   - Procedure: Procedure only, no anesthetic delivered   NPO Status:  NPO Appropriate    Anesthesia Type: Epidural.              Consents    Anesthesia Plan(s) and associated risks, benefits, and realistic alternatives discussed. Questions answered and patient/representative(s) expressed understanding.     - Discussed: Risks, Benefits and Alternatives for BOTH SEDATION and the PROCEDURE were discussed     - Discussed with:  Patient      - Extended Intubation/Ventilatory Support Discussed: No.      - Patient is DNR/DNI Status: No    Use of blood  products discussed: No .     Postoperative Care    Pain management: Neuraxial analgesia.        Comments:           neg OB ROS.       NICOLA Garcia CRNA

## 2022-10-22 NOTE — ANESTHESIA PROCEDURE NOTES
"Epidural catheter Procedure Note    Pre-Procedure   Staff -        CRNA: Terry Barton APRN CRNA       Performed By: CRNA       Location: OB       Procedure Start/Stop Times: 10/22/2022 12:42 AM and 10/22/2022 12:52 AM       Pre-Anesthestic Checklist: patient identified, IV checked, risks and benefits discussed, informed consent, monitors and equipment checked, pre-op evaluation, at physician/surgeon's request and post-op pain management  Timeout:       Correct Patient: Yes        Correct Procedure: Yes        Correct Site: Yes        Correct Position: Yes   Procedure Documentation  Procedure: epidural catheter       Patient Position: sitting       Skin prep: Chloraprep       Local skin infiltrated with 2 mL of 1% lidocaine.        Insertion Site: L3-4. (midline approach).       Technique: LORT saline        ANDREWS at 7 cm.       Needle Type: [place] needle       Needle Gauge: 17.        Needle Length (Inches): 3.5        Catheter: 19 G.          Catheter threaded easily.         7 cm epidural space.         Threaded 14 cm at skin.         # of attempts: 2 and  # of redirects:  0    Assessment/Narrative         Paresthesias: No.       Test dose of 3 mL lidocaine 1.5% w/ 1:200,000 epinephrine at 00:52 CDT.         Test dose negative, 3 minutes after injection, for signs of intravascular, subdural, or intrathecal injection.       Insertion/Infusion Method: LORT saline       Aspiration negative for Heme or CSF via Epidural Catheter.    Medication(s) Administered   Medication Administration Time: 10/22/2022 12:42 AM      FOR [facility] ([facility] [place] [place]) ONLY:   Pain Team Contact information: please page the Pain Team Via [employer]. Search \"Pain\". During daytime hours, please page the attending first. At night please page the resident first.    "

## 2022-10-23 ENCOUNTER — HEALTH MAINTENANCE LETTER (OUTPATIENT)
Age: 37
End: 2022-10-23

## 2022-10-23 VITALS
RESPIRATION RATE: 16 BRPM | SYSTOLIC BLOOD PRESSURE: 126 MMHG | TEMPERATURE: 98.1 F | HEART RATE: 89 BPM | DIASTOLIC BLOOD PRESSURE: 86 MMHG | OXYGEN SATURATION: 96 %

## 2022-10-23 LAB
ALT SERPL W P-5'-P-CCNC: 17 U/L (ref 10–35)
AST SERPL W P-5'-P-CCNC: 32 U/L (ref 10–35)
CREAT SERPL-MCNC: 0.6 MG/DL (ref 0.51–0.95)
GFR SERPL CREATININE-BSD FRML MDRD: >90 ML/MIN/1.73M2
HGB BLD-MCNC: 11.6 G/DL (ref 11.7–15.7)
PLATELET # BLD AUTO: 241 10E3/UL (ref 150–450)

## 2022-10-23 PROCEDURE — 84450 TRANSFERASE (AST) (SGOT): CPT | Performed by: STUDENT IN AN ORGANIZED HEALTH CARE EDUCATION/TRAINING PROGRAM

## 2022-10-23 PROCEDURE — 85049 AUTOMATED PLATELET COUNT: CPT | Performed by: STUDENT IN AN ORGANIZED HEALTH CARE EDUCATION/TRAINING PROGRAM

## 2022-10-23 PROCEDURE — 85018 HEMOGLOBIN: CPT | Performed by: OBSTETRICS & GYNECOLOGY

## 2022-10-23 PROCEDURE — 84460 ALANINE AMINO (ALT) (SGPT): CPT | Performed by: STUDENT IN AN ORGANIZED HEALTH CARE EDUCATION/TRAINING PROGRAM

## 2022-10-23 PROCEDURE — 82565 ASSAY OF CREATININE: CPT | Performed by: STUDENT IN AN ORGANIZED HEALTH CARE EDUCATION/TRAINING PROGRAM

## 2022-10-23 PROCEDURE — 250N000013 HC RX MED GY IP 250 OP 250 PS 637: Performed by: OBSTETRICS & GYNECOLOGY

## 2022-10-23 PROCEDURE — 36415 COLL VENOUS BLD VENIPUNCTURE: CPT | Performed by: STUDENT IN AN ORGANIZED HEALTH CARE EDUCATION/TRAINING PROGRAM

## 2022-10-23 RX ORDER — IBUPROFEN 600 MG/1
600 TABLET, FILM COATED ORAL EVERY 6 HOURS PRN
Qty: 60 TABLET | Refills: 0 | Status: SHIPPED | OUTPATIENT
Start: 2022-10-23 | End: 2023-06-29

## 2022-10-23 RX ORDER — AMOXICILLIN 250 MG
1 CAPSULE ORAL DAILY
Qty: 100 TABLET | Refills: 0 | Status: SHIPPED | OUTPATIENT
Start: 2022-10-23 | End: 2023-06-29

## 2022-10-23 RX ORDER — ACETAMINOPHEN 325 MG/1
650 TABLET ORAL EVERY 6 HOURS PRN
Qty: 100 TABLET | Refills: 0 | Status: SHIPPED | OUTPATIENT
Start: 2022-10-23

## 2022-10-23 RX ADMIN — IBUPROFEN 800 MG: 800 TABLET ORAL at 07:49

## 2022-10-23 RX ADMIN — DOCUSATE SODIUM 100 MG: 100 CAPSULE, LIQUID FILLED ORAL at 07:49

## 2022-10-23 ASSESSMENT — ACTIVITIES OF DAILY LIVING (ADL)
ADLS_ACUITY_SCORE: 18

## 2022-10-23 NOTE — PLAN OF CARE
Data: Vital signs within normal limits. Postpartum checks within normal limits - see flow record. Patient  Is tolerating po intake. Patient is able to empty bladder independently. . Patient ambulating independently..   No apparent signs of infection. Lac 2nd degree healing well. Patient Is performing self cares and Is able to care for infant. Positive attachment behaviors are observed with infant. Support persons are present.  Action:  Pain plan was discussed. Patient will request pain med when she is ready for it. Patient was medicated during the shift for pain and cramping. See MAR.Patient education done about  cares, postpartum cares, pain management/plan, and rest. See flow record.  Response:   Patient reassessed within 1 hour after each medication for pain. Patient stated that pain had improved. Patient stated that she was comfortable. .   Plan: Anticipate discharge on .

## 2022-10-23 NOTE — CONSULTS
Care Management Note:    Reason for Referral: SW consult for postpartum assessment due to score of 13 on the EPDS and for SUDS resources and that the pt is discharging to home soon as her spouse needs to work tonight & there is no one else to watch/be with their 6 year old daughter, Catia at home.    Marital Status: - Paul Black    Children: 2    Children Living with Patient: Yes    Education: some technical/college; per pt she was just a week (a few credits shy) of graduating from paramedic school prior to birth of daughter.    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  Looking for employment, but has been difficult with daughter in school in [place] as they live in [place], [place].    ESTABLISHED PROVIDERS    Psychiatrist: Yes   Provider Name: Yamel Faust, however, has not seen for several months, per pt's report   Therapist: No   Provider Name: None     Other Providers:  (, CADI worker, group home, guardian/conservator etc)  No     Mental Health Complaints: depression, anxiety and substance use  Suicide: Evidence of Ideation: No  History of Suicide Attempt: No  Homicide: Evidence of ideation: No  History of commitment: No  History of psychiatric hospitalization: No    General/Affect: Appropriate/mood-congruent and tearful at times during our discussion.    Mood: normal affect and appropriate anxiety when questioning about next steps with CPS involvement.     Presenting Problem: SW met with pt this morning to introduce self/role, perform assessment, and discuss resources. SW has also reviewed pt records. Pt recently delivered a baby boy on 10/22/22.  SW consult for postpartum assessment due to score of 13 on the EPDS and for SUDS resources discussion.    Pt is aware that her son, Cesar, will remain hospitalized for 72 hours post birth as our medical team is watching for amphetamine withdrawal.  When asked how this writer is able to assist the pt with health  pt, health mom, healthy babe, healthy family approach, pt began to cry & shared that she feels unsupported by her spouse as he will not even take 1 day off of work to be with her for labor or to care for their 6 year old so she can stay with their son.    Pt expressed that she wants out of her marriage as she has no access to fiances as her spouse is now the only wage earner & that he controls the funds.  Pt shared that their daughter goes to school in [place], they live in [place] & she travels back & forth 2x/day.  Discussed changing schools to [place] or making changes in this area to assist with decreasing stress of driving so much, cost of gas, etc.  Discussed if pt could possibly stay with a sober friend/family member at discharge & she reports her only supports are her mom & 84 year old grandma.  Pt states that if she remove herself from her spouse & the environment he controls, then she would not use amphetamines. KRISTIE offered the services of order 9035, outpatient [facility]  to connect with pt for SUDS evaluation & treatment options, but pt declined, stating she will make an appointment with Yamel Faust.    KRISTIE did inform pt that a [facility] CPS report has been filed & that medical records were requested & sent to the [facility], per protocol.  Pt tearful and asked appropriate questions about next steps.  Unfortunately, this writer unable to provide insight into what [facility] may do, but did inform pt that a CPS  will be at the hospital 10/24/22 to meet with her to discuss next steps.  Pt stated she can be back to the hospital by 8:30 AM.    Assessment:  SW inquired about any past mental health history, pt shared she lives with depression, and has experience with postpartum depressions/anxiety previously with her 6 year old daughter, Catia.     Pt has been on medications in the past. Pt has insight on the signs and symptoms to look for, SW discussed techniques for coping and the importance  of family awareness and support.     SW also encouraged pt to alert her MD of any concerns at her follow-up appointment. Pt will be provided with resources in her DC instructions.     Plan: Pt feels prepared for discharge and has no additional questions/concerns.  Baby, Cesar Black, will remain in the nursery with staff, per Charge RN.  Pt is not breastfeeding & will not be able to do tomorrow.    Care Management team to follow for discharge plans.    MEGAN Starkey

## 2022-10-23 NOTE — PROGRESS NOTES
PPD score of 13.  Order for  to see pt asap.  Pt requesting to discharge to home as she has no one to watch her 7yo tonight.  Updated [facility] [employer] on order and PPD score.  She will come soon to assess pt.

## 2022-10-23 NOTE — PROGRESS NOTES
Addendum:  SW received return call from [facility], stating that as the pt's son is on a 72 hold, they will send a SW to the hospital tomorrow to meet with the pt.  KRISTIE updated Charge RN with details.  MEGAN Starkey on 10/23/2022 at 9:18 AM    Care Management Follow Up    SW received call from [facility] Charge RN, Sarah PENALOZA, informing that pt's son's lab is positive for amphetamines.    SW placed call to [facility] , after hours phone number of [phone], and informed of the positive lab result.    SW also disclosed baby's medical information via EMR by reading RN & Provider notes to Patt.  Will print & scan to the [facility] secure inbox as requested at [email or web address].     Patt to speak with her supervisor re: next steps for pt's son, Male-Obdulia Black, and contact this writer.    Care Management team to follow for discharge plans.      MEGAN Starkey

## 2022-10-23 NOTE — PLAN OF CARE
Data: Vital signs within normal limits. Postpartum checks within normal limits - see flow record. Patient  Is tolerating po intake. Patient is able to empty bladder independently. . Patient ambulating independently..   No apparent signs of infection. Lac 2nd degree healing well. Patient Is performing self cares and Is able to care for infant. Positive attachment behaviors are observed with infant. Support persons are present.  Action:  Pain plan was discussed. Patient will request pain med when she is ready for it. Patient was medicated during the shift for cramping. See MAR.Patient education done about  cares, postpartum cares, pain management/plan, and rest. See flow record.  Response:   Patient reassessed within 1 hour after each medication for pain. Patient stated that pain had improved. Patient stated that she was comfortable. .   Plan: Anticipate discharge on 10/23.

## 2022-10-23 NOTE — PROGRESS NOTES
[facility] OB/GYN Postpartum Note    S: Pain well controlled with current pain meds. Lochia minimal.  Eating and drinking without nausea/vomiting.  Ambulating without difficulty.  Voiding without difficulty.  Many family stressors -  might need to work today.    O:  Patient Vitals for the past 24 hrs:   BP Temp Temp src Pulse Resp SpO2   10/23/22 0727 126/86 98.1  F (36.7  C) Oral 89 16 --   10/23/22 0635 129/87 -- -- -- 16 96 %   10/23/22 0230 (!) 141/81 -- -- 78 16 98 %   10/22/22 2340 135/78 98.3  F (36.8  C) Oral 88 16 99 %   10/22/22 1916 133/88 98.2  F (36.8  C) Oral 97 16 97 %   10/22/22 1500 (!) 144/86 98.7  F (37.1  C) Oral 100 16 --     Gen:  NAD  CV: Well perfused  Resp: Bilateral chest rise and fall  Abd: soft, nondistended, nontender, fundus firm at 2cm below umbilicus  Ext: non-tender, trace edema, no erythema     Latest Reference Range & Units 10/23/22 05:20   Creatinine 0.51 - 0.95 mg/dL 0.60   GFR Estimate >60 mL/min/1.73m2 >90   ALT 10 - 35 U/L 17   AST 10 - 35 U/L 32   Hemoglobin 11.7 - 15.7 g/dL 11.6 (L)   Platelet Count 150 - 450 10e3/uL 241     A/P: 37 year old  PPD#1 s/p , doing well postpartum    Routine postpartum care  - Heme: Appropriate blood loss during delivery. Continue to monitor for s/s of anemia. Repeat labs prn.  - Pain: Continue PRN ibuprofen 800mg q6hr and PRN tylenol 650mg q4hr  - GI: Continue Colace 100 mg daily  - : Voiding spontaneously   - Contraception: Did not discuss today  - Rh pos, Rubella immune, GBS neg  - PPx: Encourage ambulation    Methamphetamine and THC use  - Social work and care coordination were consulted.  The baby was placed on hold.    Fetal club foot  - Management per ped's team    Gestational hypertension  - BP of 140/85 on 2022 + 144/86 on 10/22. BPs are nl to low mild range. No preeclampsia symptoms. preE labs are nl today.    Disposition: discharge to home today or tomorrow pending on home childcare  situation    Kassidy Yi MD  Office phone: [phone]  Pager: [phone]  Obstetrics and Gynecology  [facility]   10/23/2022

## 2022-10-23 NOTE — DISCHARGE SUMMARY
[facility] OB/GYN Discharge Summary    Obdulia Black MRN# [id number]   Age: 37 year old YOB: 1985     Date of Admission:  10/21/2022  Date of Discharge:  10/23/2022  Admitting Physician:  Alia Chen MD  Discharge Physician:  Kassidy Yi MD     Admit Dx:   -Intrauterine pregnancy at 37w6d   -AMA  -baby with club foot  -desires BTL if CS  Discharge Dx:  - at 37w6d   -Maternal methamphetamine and THC use  -Fetal club foot  -Gestational hypertension    Procedures:  - Spontaneous vaginal delivery  - Epidural analgesia    Admit HPI/Labor Course:  Obdulia Black is a 37 year old  presented at 37w6d in labor.  She progressed spontaneously through labor other than AROM until complete dilation. Patient did not labor down. She pushed effectively and delivered a viable male infant with Apgars pending. Weight is 6 lbs 13 oz and skin-to-skin was performed. Delayed cord clamping was briefly performed but abandoned after about 10-15 seconds given poor tone. Peds MD was present at delivery given Cat 2 FHT and possible meconium stained fluid. Baby handed off immediately to awaiting MD. 2nd degree laceration was repaired with 3-0 vicryl in standard fashion after infiltrating with 1% lidocaine. Placenta did not immediately deliver and cord avulsed with gentle traction at about 20 min PP. It was manually removed and sweep after removal did not reveal any retained portions. QBL 400ml. Sponge and needle counts correct. Mom and baby were transported to postpartum in stable condition.   Please see her Admission H&P and Delivery Summary for further details.    Postpartum Course:  Upon admission, patient's UDS was positive for methamphetamine and THC.  Social work and care coordination was consulted.  The infant was placed on a hold.    Her postpartum course was complicated by Gestational hypertension. On 10/23, her preeclampsia labs were noted to be normal. On the day of discharge,  patient's blood pressures were normal to low mild range.  Patient did not have any preeclampsia symptoms.  She was not discharged home with any medications.    On PPD#1, she was meeting all of her postpartum goals and deemed stable for discharge. She was voiding without difficulty, tolerating a regular diet without nausea and vomiting, her pain was well controlled on oral pain medicines and her lochia was appropriate. Her hemoglobin prior to delivery was 12.6 and after delivery was 11.6. Her Rh status was positive, and Rhogam was not indicated.     Discharge Medications:     Review of your medicines      START taking      Dose / Directions   acetaminophen 325 MG tablet  Commonly known as: TYLENOL  Used for:  (spontaneous vaginal delivery)      Dose: 650 mg  Take 2 tablets (650 mg) by mouth every 6 hours as needed for mild pain Start after Delivery.  Quantity: 100 tablet  Refills: 0     ibuprofen 600 MG tablet  Commonly known as: ADVIL/MOTRIN  Used for:  (spontaneous vaginal delivery)      Dose: 600 mg  Take 1 tablet (600 mg) by mouth every 6 hours as needed for moderate pain Start after delivery  Quantity: 60 tablet  Refills: 0     senna-docusate 8.6-50 MG tablet  Commonly known as: SENOKOT-S/PERICOLACE  Used for:  (spontaneous vaginal delivery)      Dose: 1 tablet  Take 1 tablet by mouth daily Start after delivery.  Quantity: 100 tablet  Refills: 0        CONTINUE these medicines which have NOT CHANGED      Dose / Directions   cetirizine 10 MG tablet  Commonly known as: zyrTEC      Dose: 10 mg  Take 10 mg by mouth daily PRN  Refills: 0     ferrous sulfate 325 (65 Fe) MG tablet  Commonly known as: FEROSUL      Dose: 325 mg  Take 325 mg by mouth daily (with breakfast)  Refills: 0     fluticasone 50 MCG/ACT nasal spray  Commonly known as: FLONASE  Used for: Allergic rhinitis      Dose: 2 spray  Spray 2 sprays in nostril daily  Quantity: 1 Package  Refills: 11     miconazole 2 % external powder  Commonly known  as: MICATIN  Used for: Candidiasis of breast      Apply topically daily as needed for itching Under breasts.  Quantity: 43 g  Refills: 1     nystatin 366950 UNIT/GM external powder  Commonly known as: MYCOSTATIN  Used for: Yeast infection of the skin      Apply topically daily as needed for itching Under breasts  Quantity: 60 g  Refills: 1     omeprazole 40 MG DR capsule  Commonly known as: priLOSEC  Used for: Gastroesophageal reflux disease without esophagitis      Dose: 40 mg  Take 1 capsule (40 mg) by mouth daily  Quantity: 90 capsule  Refills: 4     ondansetron 4 MG tablet  Commonly known as: ZOFRAN  Used for: Nausea and vomiting in pregnancy      Dose: 4 mg  Take 1 tablet (4 mg) by mouth every 8 hours as needed for nausea  Quantity: 30 tablet  Refills: 3     prenatal multivitamin w/iron 27-0.8 MG tablet      Dose: 1 tablet  Take 1 tablet by mouth every evening  Refills: 0     sertraline 50 MG tablet  Commonly known as: ZOLOFT      Dose: 50 mg  Take 50 mg by mouth every evening  Refills: 0           Where to get your medicines      These medications were sent to [facility] [place], [place] - [address]  [address] [address]    Phone: [phone]     acetaminophen 325 MG tablet    ibuprofen 600 MG tablet    senna-docusate 8.6-50 MG tablet       Discharge/Disposition:  Obdulia Black was discharged to home in stable condition with the following instructions/medications:  - Call for temperature > 100.4, bright red vaginal bleeding >1 pad an hour x 2 hours, foul smelling vaginal discharge, pain not controlled by usual oral pain meds, persistent nausea and vomiting not controlled on medications  - She was instructed to follow-up with her primary OB in 6 weeks for a routine postpartum visit and 1 week for BP check.    Kassidy Yi MD  Office phone: [phone]  Pager: [phone]  Obstetrics and Gynecology  [facility]   10/23/2022

## 2022-11-22 ENCOUNTER — MEDICAL CORRESPONDENCE (OUTPATIENT)
Dept: HEALTH INFORMATION MANAGEMENT | Facility: CLINIC | Age: 37
End: 2022-11-22

## 2022-11-22 ENCOUNTER — DOCUMENTATION ONLY (OUTPATIENT)
Dept: PEDIATRICS | Facility: CLINIC | Age: 37
End: 2022-11-22

## 2022-11-22 NOTE — PROGRESS NOTES
EPDS was done in clinic today during child's 1 month visit and score was 12 with negative psychosis and negative screen for suicidal ideation.   Obdulia has hx of depression and substance use - she is currently working with a mental health provider.   Obdulia stated she felt safe and not thinking of hurting herself or others.    Plan:   Follow up within 1-2 weeks with OB/primary care provider was recommended.  Behavioral health referral was not placed.

## 2022-12-05 ENCOUNTER — PRENATAL OFFICE VISIT (OUTPATIENT)
Dept: OBGYN | Facility: CLINIC | Age: 37
End: 2022-12-05
Payer: COMMERCIAL

## 2022-12-05 VITALS
RESPIRATION RATE: 16 BRPM | DIASTOLIC BLOOD PRESSURE: 90 MMHG | TEMPERATURE: 98.6 F | HEART RATE: 95 BPM | WEIGHT: 187 LBS | SYSTOLIC BLOOD PRESSURE: 141 MMHG | HEIGHT: 63 IN | BODY MASS INDEX: 33.13 KG/M2

## 2022-12-05 DIAGNOSIS — Z30.430 ENCOUNTER FOR INSERTION OF INTRAUTERINE CONTRACEPTIVE DEVICE: ICD-10-CM

## 2022-12-05 DIAGNOSIS — O10.919 CHRONIC HYPERTENSION DURING PREGNANCY: Primary | ICD-10-CM

## 2022-12-05 DIAGNOSIS — Z30.430 ENCOUNTER FOR INSERTION OF MIRENA IUD: ICD-10-CM

## 2022-12-05 PROCEDURE — 99207 PR POST PARTUM EXAM: CPT | Performed by: OBSTETRICS & GYNECOLOGY

## 2022-12-05 PROCEDURE — 58300 INSERT INTRAUTERINE DEVICE: CPT | Performed by: OBSTETRICS & GYNECOLOGY

## 2022-12-05 ASSESSMENT — ANXIETY QUESTIONNAIRES
2. NOT BEING ABLE TO STOP OR CONTROL WORRYING: NOT AT ALL
GAD7 TOTAL SCORE: 4
6. BECOMING EASILY ANNOYED OR IRRITABLE: MORE THAN HALF THE DAYS
3. WORRYING TOO MUCH ABOUT DIFFERENT THINGS: SEVERAL DAYS
1. FEELING NERVOUS, ANXIOUS, OR ON EDGE: NOT AT ALL
GAD7 TOTAL SCORE: 4
5. BEING SO RESTLESS THAT IT IS HARD TO SIT STILL: NOT AT ALL
7. FEELING AFRAID AS IF SOMETHING AWFUL MIGHT HAPPEN: NOT AT ALL
IF YOU CHECKED OFF ANY PROBLEMS ON THIS QUESTIONNAIRE, HOW DIFFICULT HAVE THESE PROBLEMS MADE IT FOR YOU TO DO YOUR WORK, TAKE CARE OF THINGS AT HOME, OR GET ALONG WITH OTHER PEOPLE: NOT DIFFICULT AT ALL

## 2022-12-05 ASSESSMENT — PATIENT HEALTH QUESTIONNAIRE - PHQ9
SUM OF ALL RESPONSES TO PHQ QUESTIONS 1-9: 8
5. POOR APPETITE OR OVEREATING: SEVERAL DAYS

## 2022-12-05 NOTE — PROGRESS NOTES
"6 week Postpartum Visit Note    S:  Obdulia Black is here for her 6-week postpartum checkup. Patient doing well, denies concerns    Delivering provider:  April  Type of delivery:    Feeding Method:   Bottle  Bleeding:  Menses resumed  Bowel/Urinary problems:  denies  Mood: Denies concern  Contraception Planned:  IUD  ================================================================  ROS: 10 point ROS neg other than the symptoms noted above in the HPI.     O:  EXAM:  BP (!) 141/90 (BP Location: Right arm, Patient Position: Chair, Cuff Size: Adult Regular)   Pulse 95   Temp 98.6  F (37  C) (Tympanic)   Resp 16   Ht 1.6 m (5' 3\")   Wt 84.8 kg (187 lb)   LMP 2022   Breastfeeding No   BMI 33.13 kg/m      General: alert, oriented, well appearing  Psych: mood appropriate,   Breasts:  no swelling or redness  Abdomen: soft, non tender, uterus properly involuted,   : normal appearing external genital, vaginal mucosa, cervix. Uterus involuted and non tender, no adnexal masses.    IUD Placement Procedure Note    Obdulia Black  1985  [id number]    The patient was counseled on the risks (including including risk of infection, uterine perforation, cramping), benefits (high efficacy, low maintenance birth control, reduced risk of uterine cancer and hyperplasia, improvement in menstrual bleeding), and alternatives of the procedure. Verbal and written consent were obtained.  Pt has not had intercourse since delivery    Technique: The patient was placed in the dorsal lithotomy position.  A speculum was placed in the vagina and the cervix was cleaned with betadine swabsx3. The anterior cervical lip was grasped with a tenaculum. The Mirena IUD was loaded, advanced to the fundus, pulled back 1cm, deployed and advanced to the fundus. Using the insertor as a sound, the uterus sounded to 9 cm. IUD strings were cut 3cm from the external cervical os. The patient tolerated the procedure well and there were no " complications. EBL: 0cc.     Discussed option of returning to clinic for a string check. Pt will attempt at home in a month and return if not able to palpate    A/P:  Obdulia Black is a 37 year old  who is 6w s/p . BP mildly elevated today. Recommended visit with FM to see if long term antihypertensives indicated and to determine med as no longer breastfeeding. IUD placed as above without difficulty.    Return for annual exam after FM visit for BP, sooner if new concerns    Alia Chen MD   2022 12:13 PM

## 2022-12-05 NOTE — NURSING NOTE
"Initial BP (!) 141/90 (BP Location: Right arm, Patient Position: Chair, Cuff Size: Adult Regular)   Pulse 95   Temp 98.6  F (37  C) (Tympanic)   Resp 16   Ht 1.6 m (5' 3\")   Wt 84.8 kg (187 lb)   LMP 11/29/2022   Breastfeeding No   BMI 33.13 kg/m   Estimated body mass index is 33.13 kg/m  as calculated from the following:    Height as of this encounter: 1.6 m (5' 3\").    Weight as of this encounter: 84.8 kg (187 lb). .      "

## 2022-12-27 ENCOUNTER — MEDICAL CORRESPONDENCE (OUTPATIENT)
Dept: HEALTH INFORMATION MANAGEMENT | Facility: CLINIC | Age: 37
End: 2022-12-27

## 2023-03-02 NOTE — PROGRESS NOTES
Chief Complaint   Patient presents with     RECHECK     History of Present Illness  Obdulia Black is a 37 year old female who presents today for follow-up.  The patient went to the operating room and underwent bilateral myringotomy with tube placement and bilateral endoscopic eustachian tube dilation on 2/3/2022.  The patient was last seen on 9/7/2022 with ear tubes in good placement.    He contacted me in December 2022 with ear drainage and we placed her on eardrops.  She returns today for routine ear tube follow-up.       Since last seeing the patient, the patient reports that the ears are stable.  She will intermittently have some sharp stabbing otalgia in the left ear that will last for seconds or minutes.  Hearing is at baseline.  She does have a history of TMJ and does wear a mouthguard.  She currently denies any otalgia, otorrhea, bloody otorrhea. The patient is otherwise doing well and has no ENT related concerns.    Past Medical History  Patient Active Problem List   Diagnosis     Esophageal reflux     CARDIOVASCULAR SCREENING; LDL GOAL LESS THAN 160     Tobacco use disorder     Allergic rhinitis     Encounter for insertion of mirena IUD     Numbness and tingling of both upper extremities while sleeping     Prenatal care, subsequent pregnancy     Multigravida of advanced maternal age in third trimester     Club foot of fetus affecting antepartum care of mother, single or unspecified fetus     Current Medications    Current Outpatient Medications:      acetaminophen (TYLENOL) 325 MG tablet, Take 2 tablets (650 mg) by mouth every 6 hours as needed for mild pain Start after Delivery., Disp: 100 tablet, Rfl: 0     buPROPion (WELLBUTRIN XL) 300 MG 24 hr tablet, , Disp: , Rfl:      cetirizine (ZYRTEC) 10 MG tablet, Take 10 mg by mouth daily PRN, Disp: , Rfl:      fluticasone (FLONASE) 50 MCG/ACT nasal spray, Spray 2 sprays in nostril daily, Disp: 1 Package, Rfl: 11     ibuprofen (ADVIL/MOTRIN) 600 MG tablet,  Take 1 tablet (600 mg) by mouth every 6 hours as needed for moderate pain Start after delivery, Disp: 60 tablet, Rfl: 0     levonorgestrel (MIRENA) 20 MCG/DAY IUD, 1 each (20 mcg) by Intrauterine route once, Disp: , Rfl:      nystatin (MYCOSTATIN) 971652 UNIT/GM external powder, Apply topically daily as needed for itching Under breasts, Disp: 60 g, Rfl: 1     omeprazole (PRILOSEC) 40 MG DR capsule, Take 1 capsule (40 mg) by mouth daily, Disp: 90 capsule, Rfl: 4     Prenatal Vit-Fe Fumarate-FA (PRENATAL MULTIVITAMIN W/IRON) 27-0.8 MG tablet, Take 1 tablet by mouth every evening, Disp: , Rfl:      sertraline (ZOLOFT) 50 MG tablet, Take 50 mg by mouth every evening, Disp: , Rfl:      ferrous sulfate (FEROSUL) 325 (65 Fe) MG tablet, Take 325 mg by mouth daily (with breakfast) (Patient not taking: Reported on 2022), Disp: , Rfl:      miconazole (MICATIN) 2 % external powder, Apply topically daily as needed for itching Under breasts. (Patient not taking: Reported on 2022), Disp: 43 g, Rfl: 1     ondansetron (ZOFRAN) 4 MG tablet, Take 1 tablet (4 mg) by mouth every 8 hours as needed for nausea (Patient not taking: Reported on 2022), Disp: 30 tablet, Rfl: 3     senna-docusate (SENOKOT-S/PERICOLACE) 8.6-50 MG tablet, Take 1 tablet by mouth daily Start after delivery. (Patient not taking: Reported on 2022), Disp: 100 tablet, Rfl: 0    Allergies  Allergies   Allergen Reactions     Sulfa Drugs Itching and Swelling       Social History  Social History     Socioeconomic History     Marital status:    Tobacco Use     Smoking status: Former     Packs/day: 0.00     Years: 10.00     Pack years: 0.00     Types: Cigarettes     Quit date: 2008     Years since quittin.7     Smokeless tobacco: Never     Tobacco comments:     0   Vaping Use     Vaping Use: Never used   Substance and Sexual Activity     Alcohol use: Not Currently     Comment: rare     Drug use: Not Currently     Types: Marijuana      Comment: last use 2021     Sexual activity: Yes     Partners: Male     Comment:     Other Topics Concern     Parent/sibling w/ CABG, MI or angioplasty before 65F 55M? Yes       Family History  Family History   Problem Relation Age of Onset     Anemia Mother      Depression Mother      Anxiety Disorder Mother      Hypertension Father      Rheumatoid Arthritis Father      Hypertension Maternal Grandmother      Other Cancer Maternal Grandmother         leukemia     Gastrointestinal Disease Maternal Grandmother         diverticulitis     Thyroid Disease Maternal Grandmother      Diabetes Maternal Grandfather      Cerebrovascular Disease Maternal Grandfather      Heart Disease Maternal Grandfather         MI- open heart surgery     Eye Disorder Maternal Grandfather      Depression Maternal Grandfather      Cancer Paternal Grandmother         leukemia     Prostate Cancer Paternal Grandfather      Alzheimer Disease Paternal Grandfather      Substance Abuse Paternal Grandfather         alcohol     Substance Abuse Brother         prescription pills     Asthma Sister         Half sister     Diabetes Maternal Uncle      Lupus Other        Review of Systems  As per HPI and PMHx, otherwise 10 system review including the head and neck, constitutional, eyes, respiratory, GI, skin, neurologic, lymphatic, endocrine, and allergy systems is negative.    Physical Exam  /76   Pulse 90   Temp 98  F (36.7  C) (Tympanic)   GENERAL: Patient is a pleasant, cooperative 37 year old female in no acute distress.  HEAD: Normocephalic, atraumatic.  Hair and scalp are normal.  EYES: Pupils are equal, round, reactive to light and accommodation.  Extraocular movements are intact.  The sclera nonicteric without injection.  The extraocular structures are normal.  EARS: See below.  NOSE: Nares are patent.  Nasal mucosa is pink and moist.  Negative anterior rhinoscopy.  NEUROLOGIC: Cranial nerves II through XII are grossly intact.  Voice  is strong.  Patient is House-Brackmann I/VI bilaterally.  CARDIOVASCULAR: Extremities are warm and well-perfused.  No significant peripheral edema.  RESPIRATORY: Patient has nonlabored breathing without cough, wheeze, stridor.  PSYCHIATRIC: Patient is alert and oriented.  Mood and affect appear normal.  SKIN: Warm and dry.  No scalp, face, or neck lesions noted.    Physical Exam and Procedure    EARS: Normal shape and symmetry.  No tenderness when palpating the mastoid or tragal areas bilaterally.  The ears were then examined under the otic binocular microscope to perform cerumen removal.  Otoscopic exam on the right reveals impacted cerumen down to the level of the tympanic membrane.  The cerumen was removed with a curette and alligator forceps.  There was an extruded Dura-Vent ear tube overlying the tympanic membrane encased in cerumen.  The cerumen and ear tube were removed with an alligator forceps.  There was a pinpoint anterior-inferior tympanic membrane perforation.  The middle ear was well aerated.  No retraction, granulation, drainage, or evidence of cholesteatoma.      Attention was turned to the left ear.  Otoscopic exam on the left reveals impacted cerumen down to the level of the tympanic membrane.  The cerumen was removed with a curette and alligator forceps.  There is a Dura-Vent ear tube in the posterior-inferior quadrant with a slight amount of surrounding crusting.  There is a small amount of crusting within the lumen with the ear tube appears patent.  The middle ear is well aerated.  No retraction, granulation, drainage, or evidence of cholesteatoma.    Assessment and Plan     ICD-10-CM    1. Dysfunction of both eustachian tubes  H69.83       2. Conductive hearing loss, bilateral  H90.0       3. History of allergic rhinitis  Z87.09       4. Retained myringotomy tube in right ear  Z96.22       5. Retained myringotomy tube in left ear  Z96.22          It was my pleasure seeing Obdulia Black today  in clinic.  The patient is doing well after ear tube placement.  The tubes are in good placement.  I would recommend observation at this time.  The patient will return to clinic in 6 months for routine ear tube follow-up.  We discussed what to do in the event of acute otorrhea.  Instructions were provided.  The patient knows to contact me with problems or concerns.     Maxx Feliciano MD  Department of Otolaryngology-Head and Neck Surgery  [facility]

## 2023-03-06 ENCOUNTER — OFFICE VISIT (OUTPATIENT)
Dept: OTOLARYNGOLOGY | Facility: CLINIC | Age: 38
End: 2023-03-06
Payer: COMMERCIAL

## 2023-03-06 VITALS — SYSTOLIC BLOOD PRESSURE: 124 MMHG | TEMPERATURE: 98 F | DIASTOLIC BLOOD PRESSURE: 76 MMHG | HEART RATE: 90 BPM

## 2023-03-06 DIAGNOSIS — Z87.09 HISTORY OF ALLERGIC RHINITIS: ICD-10-CM

## 2023-03-06 DIAGNOSIS — Z96.22 RETAINED MYRINGOTOMY TUBE IN LEFT EAR: ICD-10-CM

## 2023-03-06 DIAGNOSIS — H69.93 DYSFUNCTION OF BOTH EUSTACHIAN TUBES: Primary | ICD-10-CM

## 2023-03-06 DIAGNOSIS — H72.91 PERFORATION OF TYMPANIC MEMBRANE, RIGHT: ICD-10-CM

## 2023-03-06 DIAGNOSIS — H90.0 CONDUCTIVE HEARING LOSS, BILATERAL: ICD-10-CM

## 2023-03-06 PROCEDURE — 99213 OFFICE O/P EST LOW 20 MIN: CPT | Performed by: OTOLARYNGOLOGY

## 2023-03-06 RX ORDER — BUPROPION HYDROCHLORIDE 300 MG/1
TABLET ORAL
COMMUNITY
Start: 2023-03-03

## 2023-03-06 ASSESSMENT — PAIN SCALES - GENERAL: PAINLEVEL: NO PAIN (0)

## 2023-03-06 NOTE — NURSING NOTE
"Initial /76   Pulse 90   Temp 98  F (36.7  C) (Tympanic)  Estimated body mass index is 33.13 kg/m  as calculated from the following:    Height as of 12/5/22: 1.6 m (5' 3\").    Weight as of 12/5/22: 84.8 kg (187 lb). .    Maddie Franklin LPN on 3/6/2023 at 8:04 AM    "

## 2023-03-06 NOTE — LETTER
3/6/2023         RE: Obdulia Black  [address] [facility] [id number]        Dear Colleague,    Thank you for referring your patient, Obdulia Black, to the [facility]. Please see a copy of my visit note below.    Chief Complaint   Patient presents with     RECHECK     History of Present Illness  Obdulia Black is a 37 year old female who presents today for follow-up.  The patient went to the operating room and underwent bilateral myringotomy with tube placement and bilateral endoscopic eustachian tube dilation on 2/3/2022.  The patient was last seen on 9/7/2022 with ear tubes in good placement.    He contacted me in December 2022 with ear drainage and we placed her on eardrops.  She returns today for routine ear tube follow-up.       Since last seeing the patient, the patient reports that the ears are stable.  She will intermittently have some sharp stabbing otalgia in the left ear that will last for seconds or minutes.  Hearing is at baseline.  She does have a history of TMJ and does wear a mouthguard.  She currently denies any otalgia, otorrhea, bloody otorrhea. The patient is otherwise doing well and has no ENT related concerns.    Past Medical History  Patient Active Problem List   Diagnosis     Esophageal reflux     CARDIOVASCULAR SCREENING; LDL GOAL LESS THAN 160     Tobacco use disorder     Allergic rhinitis     Encounter for insertion of mirena IUD     Numbness and tingling of both upper extremities while sleeping     Prenatal care, subsequent pregnancy     Multigravida of advanced maternal age in third trimester     Club foot of fetus affecting antepartum care of mother, single or unspecified fetus     Current Medications    Current Outpatient Medications:      acetaminophen (TYLENOL) 325 MG tablet, Take 2 tablets (650 mg) by mouth every 6 hours as needed for mild pain Start after Delivery., Disp: 100 tablet, Rfl: 0     buPROPion (WELLBUTRIN XL) 300 MG 24 hr  tablet, , Disp: , Rfl:      cetirizine (ZYRTEC) 10 MG tablet, Take 10 mg by mouth daily PRN, Disp: , Rfl:      fluticasone (FLONASE) 50 MCG/ACT nasal spray, Spray 2 sprays in nostril daily, Disp: 1 Package, Rfl: 11     ibuprofen (ADVIL/MOTRIN) 600 MG tablet, Take 1 tablet (600 mg) by mouth every 6 hours as needed for moderate pain Start after delivery, Disp: 60 tablet, Rfl: 0     levonorgestrel (MIRENA) 20 MCG/DAY IUD, 1 each (20 mcg) by Intrauterine route once, Disp: , Rfl:      nystatin (MYCOSTATIN) 912048 UNIT/GM external powder, Apply topically daily as needed for itching Under breasts, Disp: 60 g, Rfl: 1     omeprazole (PRILOSEC) 40 MG DR capsule, Take 1 capsule (40 mg) by mouth daily, Disp: 90 capsule, Rfl: 4     Prenatal Vit-Fe Fumarate-FA (PRENATAL MULTIVITAMIN W/IRON) 27-0.8 MG tablet, Take 1 tablet by mouth every evening, Disp: , Rfl:      sertraline (ZOLOFT) 50 MG tablet, Take 50 mg by mouth every evening, Disp: , Rfl:      ferrous sulfate (FEROSUL) 325 (65 Fe) MG tablet, Take 325 mg by mouth daily (with breakfast) (Patient not taking: Reported on 12/5/2022), Disp: , Rfl:      miconazole (MICATIN) 2 % external powder, Apply topically daily as needed for itching Under breasts. (Patient not taking: Reported on 12/5/2022), Disp: 43 g, Rfl: 1     ondansetron (ZOFRAN) 4 MG tablet, Take 1 tablet (4 mg) by mouth every 8 hours as needed for nausea (Patient not taking: Reported on 12/5/2022), Disp: 30 tablet, Rfl: 3     senna-docusate (SENOKOT-S/PERICOLACE) 8.6-50 MG tablet, Take 1 tablet by mouth daily Start after delivery. (Patient not taking: Reported on 12/5/2022), Disp: 100 tablet, Rfl: 0    Allergies  Allergies   Allergen Reactions     Sulfa Drugs Itching and Swelling       Social History  Social History     Socioeconomic History     Marital status:    Tobacco Use     Smoking status: Former     Packs/day: 0.00     Years: 10.00     Pack years: 0.00     Types: Cigarettes     Quit date: 6/1/2008      Years since quittin.7     Smokeless tobacco: Never     Tobacco comments:     0   Vaping Use     Vaping Use: Never used   Substance and Sexual Activity     Alcohol use: Not Currently     Comment: rare     Drug use: Not Currently     Types: Marijuana     Comment: last use      Sexual activity: Yes     Partners: Male     Comment:     Other Topics Concern     Parent/sibling w/ CABG, MI or angioplasty before 65F 55M? Yes       Family History  Family History   Problem Relation Age of Onset     Anemia Mother      Depression Mother      Anxiety Disorder Mother      Hypertension Father      Rheumatoid Arthritis Father      Hypertension Maternal Grandmother      Other Cancer Maternal Grandmother         leukemia     Gastrointestinal Disease Maternal Grandmother         diverticulitis     Thyroid Disease Maternal Grandmother      Diabetes Maternal Grandfather      Cerebrovascular Disease Maternal Grandfather      Heart Disease Maternal Grandfather         MI- open heart surgery     Eye Disorder Maternal Grandfather      Depression Maternal Grandfather      Cancer Paternal Grandmother         leukemia     Prostate Cancer Paternal Grandfather      Alzheimer Disease Paternal Grandfather      Substance Abuse Paternal Grandfather         alcohol     Substance Abuse Brother         prescription pills     Asthma Sister         Half sister     Diabetes Maternal Uncle      Lupus Other        Review of Systems  As per HPI and PMHx, otherwise 10 system review including the head and neck, constitutional, eyes, respiratory, GI, skin, neurologic, lymphatic, endocrine, and allergy systems is negative.    Physical Exam  /76   Pulse 90   Temp 98  F (36.7  C) (Tympanic)   GENERAL: Patient is a pleasant, cooperative 37 year old female in no acute distress.  HEAD: Normocephalic, atraumatic.  Hair and scalp are normal.  EYES: Pupils are equal, round, reactive to light and accommodation.  Extraocular movements are intact.   The sclera nonicteric without injection.  The extraocular structures are normal.  EARS: See below.  NOSE: Nares are patent.  Nasal mucosa is pink and moist.  Negative anterior rhinoscopy.  NEUROLOGIC: Cranial nerves II through XII are grossly intact.  Voice is strong.  Patient is House-Brackmann I/VI bilaterally.  CARDIOVASCULAR: Extremities are warm and well-perfused.  No significant peripheral edema.  RESPIRATORY: Patient has nonlabored breathing without cough, wheeze, stridor.  PSYCHIATRIC: Patient is alert and oriented.  Mood and affect appear normal.  SKIN: Warm and dry.  No scalp, face, or neck lesions noted.    Physical Exam and Procedure    EARS: Normal shape and symmetry.  No tenderness when palpating the mastoid or tragal areas bilaterally.  The ears were then examined under the otic binocular microscope to perform cerumen removal.  Otoscopic exam on the right reveals impacted cerumen down to the level of the tympanic membrane.  The cerumen was removed with a curette and alligator forceps.  There was an extruded Dura-Vent ear tube overlying the tympanic membrane encased in cerumen.  The cerumen and ear tube were removed with an alligator forceps.  There was a pinpoint anterior-inferior tympanic membrane perforation.  The middle ear was well aerated.  No retraction, granulation, drainage, or evidence of cholesteatoma.      Attention was turned to the left ear.  Otoscopic exam on the left reveals impacted cerumen down to the level of the tympanic membrane.  The cerumen was removed with a curette and alligator forceps.  There is a Dura-Vent ear tube in the posterior-inferior quadrant with a slight amount of surrounding crusting.  There is a small amount of crusting within the lumen with the ear tube appears patent.  The middle ear is well aerated.  No retraction, granulation, drainage, or evidence of cholesteatoma.    Assessment and Plan     ICD-10-CM    1. Dysfunction of both eustachian tubes  H69.83        2. Conductive hearing loss, bilateral  H90.0       3. History of allergic rhinitis  Z87.09       4. Retained myringotomy tube in right ear  Z96.22       5. Retained myringotomy tube in left ear  Z96.22          It was my pleasure seeing Obdulia Black today in clinic.  The patient is doing well after ear tube placement.  The tubes are in good placement.  I would recommend observation at this time.  The patient will return to clinic in 6 months for routine ear tube follow-up.  We discussed what to do in the event of acute otorrhea.  Instructions were provided.  The patient knows to contact me with problems or concerns.     Maxx Feliciano MD  Department of Otolaryngology-Head and Neck Surgery  [facility]         Again, thank you for allowing me to participate in the care of your patient.        Sincerely,        Maxx Feliciano MD

## 2023-06-28 ASSESSMENT — ENCOUNTER SYMPTOMS
SHORTNESS OF BREATH: 0
ARTHRALGIAS: 1
JOINT SWELLING: 0
BREAST MASS: 0
ABDOMINAL PAIN: 0
EYE PAIN: 0
CHILLS: 0
SORE THROAT: 0
HEMATURIA: 0
FEVER: 0
MYALGIAS: 0
NERVOUS/ANXIOUS: 0
PALPITATIONS: 0
PARESTHESIAS: 0
FREQUENCY: 0
HEADACHES: 1
NAUSEA: 0
HEMATOCHEZIA: 0
HEARTBURN: 1
DYSURIA: 0
WEAKNESS: 0
DIARRHEA: 0
DIZZINESS: 0
CONSTIPATION: 0
COUGH: 0

## 2023-06-29 ENCOUNTER — ANCILLARY PROCEDURE (OUTPATIENT)
Dept: GENERAL RADIOLOGY | Facility: CLINIC | Age: 38
End: 2023-06-29
Attending: NURSE PRACTITIONER
Payer: COMMERCIAL

## 2023-06-29 ENCOUNTER — OFFICE VISIT (OUTPATIENT)
Dept: FAMILY MEDICINE | Facility: CLINIC | Age: 38
End: 2023-06-29
Payer: COMMERCIAL

## 2023-06-29 VITALS
HEIGHT: 64 IN | RESPIRATION RATE: 14 BRPM | WEIGHT: 216 LBS | SYSTOLIC BLOOD PRESSURE: 106 MMHG | OXYGEN SATURATION: 99 % | TEMPERATURE: 99.1 F | BODY MASS INDEX: 36.88 KG/M2 | HEART RATE: 89 BPM | DIASTOLIC BLOOD PRESSURE: 72 MMHG

## 2023-06-29 DIAGNOSIS — Z00.00 ROUTINE GENERAL MEDICAL EXAMINATION AT A HEALTH CARE FACILITY: Primary | ICD-10-CM

## 2023-06-29 DIAGNOSIS — M25.562 ACUTE BILATERAL KNEE PAIN: ICD-10-CM

## 2023-06-29 DIAGNOSIS — M25.561 ACUTE BILATERAL KNEE PAIN: ICD-10-CM

## 2023-06-29 DIAGNOSIS — F15.20 METHAMPHETAMINE ADDICTION (H): ICD-10-CM

## 2023-06-29 PROCEDURE — 99396 PREV VISIT EST AGE 40-64: CPT | Performed by: NURSE PRACTITIONER

## 2023-06-29 PROCEDURE — 73562 X-RAY EXAM OF KNEE 3: CPT | Mod: TC | Performed by: RADIOLOGY

## 2023-06-29 PROCEDURE — 99213 OFFICE O/P EST LOW 20 MIN: CPT | Mod: 25 | Performed by: NURSE PRACTITIONER

## 2023-06-29 RX ORDER — ACETAMINOPHEN 160 MG
TABLET,DISINTEGRATING ORAL
COMMUNITY
Start: 2023-06-07

## 2023-06-29 RX ORDER — FLUOXETINE 40 MG/1
CAPSULE ORAL
COMMUNITY
Start: 2023-06-07

## 2023-06-29 RX ORDER — TOPIRAMATE 50 MG/1
TABLET, FILM COATED ORAL
COMMUNITY
Start: 2023-06-06

## 2023-06-29 RX ORDER — TRAZODONE HYDROCHLORIDE 100 MG/1
TABLET ORAL
COMMUNITY
Start: 2023-06-07

## 2023-06-29 ASSESSMENT — ENCOUNTER SYMPTOMS
PALPITATIONS: 0
EYE PAIN: 0
DIARRHEA: 0
JOINT SWELLING: 0
HEARTBURN: 1
PARESTHESIAS: 0
NAUSEA: 0
ABDOMINAL PAIN: 0
FREQUENCY: 0
HEADACHES: 1
HEMATOCHEZIA: 0
DIZZINESS: 0
FEVER: 0
COUGH: 0
SORE THROAT: 0
CONSTIPATION: 0
CHILLS: 0
SHORTNESS OF BREATH: 0
DYSURIA: 0
NERVOUS/ANXIOUS: 0
WEAKNESS: 0
ARTHRALGIAS: 1
HEMATURIA: 0
MYALGIAS: 0
BREAST MASS: 0

## 2023-06-29 ASSESSMENT — PAIN SCALES - GENERAL: PAINLEVEL: NO PAIN (0)

## 2023-06-29 NOTE — PROGRESS NOTES
SUBJECTIVE:   CC: Obdulia is an 38 year old who presents for preventive health visit.       6/29/2023     1:41 PM   Additional Questions   Roomed by Krista     Healthy Habits:     Getting at least 3 servings of Calcium per day:  Yes    Bi-annual eye exam:  NO    Dental care twice a year:  NO    Sleep apnea or symptoms of sleep apnea:  None    Diet:  Regular (no restrictions)    Duration of exercise:  Less than 15 minutes    Taking medications regularly:  Yes    Medication side effects:  None    Additional concerns today:  No    Sees Krista Bullock- monthly Dr [facility] for psych meds  Meth addiction x 4 yrs sober x 5 months  93 residential treatment . [facility] in [place]. Helpful  Kids taken and with Mom since December.  8 months son and 6 daughter- reunification starting  OP treament and therapy and counseling   Bilateral knee pain x 2 months.  Dad side of family with knee issues  A lot of softball playing when younger   no trauma to the area.    Living in [facility] for now with her aunt and uncle  Home with . Not working out and he is still using and her family who is living there is still using         Today's PHQ-2 Score:       6/28/2023     4:24 PM   PHQ-2 ( 1999 [employer])   Q1: Little interest or pleasure in doing things 0   Q2: Feeling down, depressed or hopeless 0   PHQ-2 Score 0   Q1: Little interest or pleasure in doing things Not at all   Q2: Feeling down, depressed or hopeless Not at all   PHQ-2 Score 0         -------------------------------------  Have you ever done Advance Care Planning? (For example, a Health Directive, POLST, or a discussion with a medical provider or your loved ones about your wishes):     Social History     Tobacco Use     Smoking status: Former     Packs/day: 0.00     Years: 10.00     Pack years: 0.00     Types: Cigarettes     Quit date: 6/1/2008     Years since quitting: 15.0     Smokeless tobacco: Never     Tobacco comments:     0   Substance Use Topics      Alcohol use: Not Currently     Comment: rare         6/28/2023     4:22 PM   Alcohol Use   Prescreen: >3 drinks/day or >7 drinks/week? No     Reviewed orders with patient.  Reviewed health maintenance and updated orders accordingly - Yes  BP Readings from Last 3 Encounters:   06/29/23 106/72   03/06/23 124/76   12/05/22 (!) 141/90    Wt Readings from Last 3 Encounters:   06/29/23 98 kg (216 lb)   12/05/22 84.8 kg (187 lb)   10/18/22 98.9 kg (218 lb)                  Patient Active Problem List   Diagnosis     Esophageal reflux     CARDIOVASCULAR SCREENING; LDL GOAL LESS THAN 160     Tobacco use disorder     Allergic rhinitis     Encounter for insertion of mirena IUD     Numbness and tingling of both upper extremities while sleeping     Prenatal care, subsequent pregnancy     Multigravida of advanced maternal age in third trimester     Club foot of fetus affecting antepartum care of mother, single or unspecified fetus     Past Surgical History:   Procedure Laterality Date     BIOPSY  2002     ENDOSCOPIC BALLOON SINUPLASTY ACCLARENT Bilateral 2/3/2022    Procedure: ENDOSCOPIC EUSTACHAIN TUBE BALLOON DILATION;  Surgeon: Maxx Feliciano MD;  Location: [facility]     MOUTH SURGERY      wisdom teeth x 4     MYRINGOTOMY, INSERT TUBE BILATERAL, COMBINED Bilateral 2/3/2022    Procedure: MYRINGOTOMY, BILATERAL, WITH VENTILATION TUBE INSERTION;  Surgeon: Maxx Feliciano MD;  Location: [facility]     PE TUBES  3/02/92     SURGICAL HISTORY OF -   03/20/03    Excision of cyst on left middle finger     TONSILLECTOMY & ADENOIDECTOMY  12/27/1991       Social History     Tobacco Use     Smoking status: Former     Packs/day: 0.00     Years: 10.00     Pack years: 0.00     Types: Cigarettes     Quit date: 6/1/2008     Years since quitting: 15.0     Smokeless tobacco: Never     Tobacco comments:     0   Substance Use Topics     Alcohol use: Not Currently     Comment: rare     Family History   Problem Relation Age of Onset     Anemia Mother       Depression Mother      Anxiety Disorder Mother      Hypertension Father      Rheumatoid Arthritis Father      Hypertension Maternal Grandmother      Other Cancer Maternal Grandmother         leukemia     Gastrointestinal Disease Maternal Grandmother         diverticulitis     Thyroid Disease Maternal Grandmother      Diabetes Maternal Grandfather      Cerebrovascular Disease Maternal Grandfather      Heart Disease Maternal Grandfather         MI- open heart surgery     Eye Disorder Maternal Grandfather      Depression Maternal Grandfather      Cancer Paternal Grandmother         leukemia     Obesity Paternal Grandmother      Prostate Cancer Paternal Grandfather      Alzheimer Disease Paternal Grandfather      Substance Abuse Paternal Grandfather         alcohol     Substance Abuse Brother         prescription pills     Asthma Sister         Half sister     Diabetes Maternal Uncle      Lupus Other          Current Outpatient Medications   Medication Sig Dispense Refill     acetaminophen (TYLENOL) 325 MG tablet Take 2 tablets (650 mg) by mouth every 6 hours as needed for mild pain Start after Delivery. 100 tablet 0     buPROPion (WELLBUTRIN XL) 300 MG 24 hr tablet        cetirizine (ZYRTEC) 10 MG tablet Take 10 mg by mouth daily PRN       Cholecalciferol (VITAMIN D3) 50 MCG (2000 UT) CAPS        FLUoxetine (PROZAC) 40 MG capsule        levonorgestrel (MIRENA) 20 MCG/DAY IUD 1 each (20 mcg) by Intrauterine route once       omeprazole (PRILOSEC) 40 MG DR capsule Take 1 capsule (40 mg) by mouth daily 90 capsule 4     topiramate (TOPAMAX) 50 MG tablet        traZODone (DESYREL) 100 MG tablet        fluticasone (FLONASE) 50 MCG/ACT nasal spray Spray 2 sprays in nostril daily (Patient not taking: Reported on 2023) 1 Package 11     Allergies   Allergen Reactions     Sulfa Antibiotics Itching and Swelling       Breast Cancer Screenin/27/2022     8:02 AM   Breast CA Risk Assessment (FHS-7)   Do you have a  family history of breast, colon, or ovarian cancer? No / Unknown         Mammogram Screening - Mammography discussed and declined  Pertinent mammograms are reviewed under the imaging tab.    History of abnormal Pap smear:   NO - age 30-65 PAP every 5 years with negative HPV co-testing recommended  Last 3 Pap Results:   PAP (no units)   Date Value   07/14/2020 NIL   09/21/2016 NIL   03/19/2015 NIL     Last 3 Pap and HPV Results:       Latest Ref Rng & Units 7/14/2020     4:15 PM 7/14/2020     4:12 PM 9/21/2016    10:53 AM   PAP / HPV   PAP (Historical)   NIL     HPV 16 DNA NEG^Negative Negative   Negative    HPV 18 DNA NEG^Negative Negative   Negative    Other HR HPV NEG^Negative Negative   Negative          Latest Ref Rng & Units 7/14/2020     4:15 PM 7/14/2020     4:12 PM 9/21/2016    10:53 AM   PAP / HPV   PAP (Historical)   NIL     HPV 16 DNA NEG^Negative Negative   Negative    HPV 18 DNA NEG^Negative Negative   Negative    Other HR HPV NEG^Negative Negative   Negative      Reviewed and updated as needed this visit by clinical staff   Tobacco  Allergies  Meds            mirena IUD in place  Periods monthly    Reviewed and updated as needed this visit by Provider                 Past Medical History:   Diagnosis Date     Chickenpox      Depression      Depressive disorder 1999     Moderate persistent asthma       Past Surgical History:   Procedure Laterality Date     BIOPSY  2002     ENDOSCOPIC BALLOON SINUPLASTY ACCLARENT Bilateral 2/3/2022    Procedure: ENDOSCOPIC EUSTACHAIN TUBE BALLOON DILATION;  Surgeon: Maxx Feliciano MD;  Location: [facility]     MOUTH SURGERY      wisdom teeth x 4     MYRINGOTOMY, INSERT TUBE BILATERAL, COMBINED Bilateral 2/3/2022    Procedure: MYRINGOTOMY, BILATERAL, WITH VENTILATION TUBE INSERTION;  Surgeon: Maxx Feliciano MD;  Location: [facility]     PE TUBES  3/02/92     SURGICAL HISTORY OF -   03/20/03    Excision of cyst on left middle finger     TONSILLECTOMY & ADENOIDECTOMY  12/27/1991  "    OB History    Para Term  AB Living   2 2 2 0 0 2   SAB IAB Ectopic Multiple Live Births   0 0 0 0 2      # Outcome Date GA Lbr Terrance/2nd Weight Sex Delivery Anes PTL Lv   2 Term 10/22/22 37w6d 06:13 / 00:56 3.085 kg (6 lb 12.8 oz) M Vag-Spont EPI N ANGELA      Name: HANS,MALE-JOY      Apgar1: 6  Apgar5: 7   1 Term 16 39w3d 21:15 / 03:36 3.011 kg (6 lb 10.2 oz) F Vag-Spont EPI, Local  ANGELA      Name: Catia      Apgar1: 7  Apgar5: 8       Review of Systems   Constitutional: Negative for chills and fever.   HENT: Negative for congestion, ear pain, hearing loss and sore throat.    Eyes: Negative for pain and visual disturbance.   Respiratory: Negative for cough and shortness of breath.    Cardiovascular: Negative for chest pain, palpitations and peripheral edema.   Gastrointestinal: Positive for heartburn. Negative for abdominal pain, constipation, diarrhea, hematochezia and nausea.   Breasts:  Negative for tenderness, breast mass and discharge.   Genitourinary: Negative for dysuria, frequency, genital sores, hematuria, pelvic pain, urgency, vaginal bleeding and vaginal discharge.   Musculoskeletal: Positive for arthralgias. Negative for joint swelling and myalgias.   Skin: Negative for rash.   Neurological: Positive for headaches. Negative for dizziness, weakness and paresthesias.   Psychiatric/Behavioral: Negative for mood changes. The patient is not nervous/anxious.           OBJECTIVE:   /72   Pulse 89   Temp 99.1  F (37.3  C) (Tympanic)   Resp 14   Ht 1.613 m (5' 3.5\")   Wt 98 kg (216 lb)   LMP 2023   SpO2 99%   BMI 37.66 kg/m    Physical Exam  GENERAL: healthy, alert and no distress  EYES: Eyes grossly normal to inspection, PERRL and conjunctivae and sclerae normal  HENT: ear canals and TM's normal, nose and mouth without ulcers or lesions  NECK: no adenopathy, no asymmetry, masses, or scars and thyroid normal to palpation  RESP: lungs clear to auscultation - no rales, " "rhonchi or wheezes  BREAST: normal without masses, tenderness or nipple discharge and no palpable axillary masses or adenopathy  CV: regular rate and rhythm, normal S1 S2, no S3 or S4, no murmur, click or rub, no peripheral edema and peripheral pulses strong  ABDOMEN: soft, nontender, no hepatosplenomegaly, no masses and bowel sounds normal  MS: no gross musculoskeletal defects noted, no edema  SKIN: no suspicious lesions or rashes  NEURO: Normal strength and tone, mentation intact and speech normal  PSYCH: mentation appears normal, affect normal/bright    Diagnostic Test Results:  Labs reviewed in Epic  Results for orders placed or performed in visit on 06/29/23   XR Knee Bilateral 3 Views     Status: None    Narrative    XR KNEE BILATERAL 3 VIEWS  6/29/2023 3:05 PM     HISTORY: Acute bilateral knee pain; Acute bilateral knee pain    COMPARISON: None.      Impression    IMPRESSION:  No fractures are evident. No knee joint effusions. Normal  patellar alignment. Normal joint spacing.     YONI BARNHART MD         SYSTEM ID:  [id number]       ASSESSMENT/PLAN:   Obdulia was seen today for physical.    Diagnoses and all orders for this visit:    Routine general medical examination at a health care facility    Acute bilateral knee pain  RICE  Topical Voltaren gel  -     XR Knee Bilateral 3 Views; Future    Methamphetamine addiction (H)  Ongoing psychotherapy  Follow up with psychiatry as planned  Abstain from drugs and ETOH  Continue meetings and OP treatment      BMI 37.0-37.9, adult  Increase activity and improve nutrition  Consider bariatric consult         Patient has been advised of split billing requirements and indicates understanding: Yes      COUNSELING:  Reviewed preventive health counseling, as reflected in patient instructions      BMI:   Estimated body mass index is 37.66 kg/m  as calculated from the following:    Height as of this encounter: 1.613 m (5' 3.5\").    Weight as of this encounter: 98 kg (216 lb). "   Weight management plan: Discussed healthy diet and exercise guidelines      She reports that she quit smoking about 15 years ago. Her smoking use included cigarettes. She has never used smokeless tobacco.      Call or return to the clinic with any worsening of symptoms or no resolution. Patient/Parent verbalized understanding and is in agreement. Medication side effects reviewed.   Current Outpatient Medications   Medication Sig Dispense Refill     acetaminophen (TYLENOL) 325 MG tablet Take 2 tablets (650 mg) by mouth every 6 hours as needed for mild pain Start after Delivery. 100 tablet 0     buPROPion (WELLBUTRIN XL) 300 MG 24 hr tablet        cetirizine (ZYRTEC) 10 MG tablet Take 10 mg by mouth daily PRN       Cholecalciferol (VITAMIN D3) 50 MCG (2000 UT) CAPS        FLUoxetine (PROZAC) 40 MG capsule        levonorgestrel (MIRENA) 20 MCG/DAY IUD 1 each (20 mcg) by Intrauterine route once       omeprazole (PRILOSEC) 40 MG DR capsule Take 1 capsule (40 mg) by mouth daily 90 capsule 4     topiramate (TOPAMAX) 50 MG tablet        traZODone (DESYREL) 100 MG tablet        fluticasone (FLONASE) 50 MCG/ACT nasal spray Spray 2 sprays in nostril daily (Patient not taking: Reported on 6/29/2023) 1 Package 11     Chart documentation with Dragon Voice recognition Software. Although reviewed after completion, some words and grammatical errors may remain.  NICOLA Pittman [facility]

## 2023-07-06 DIAGNOSIS — F15.20 METHAMPHETAMINE ADDICTION (H): ICD-10-CM

## 2023-07-06 DIAGNOSIS — D64.9 ANEMIA, UNSPECIFIED TYPE: Primary | ICD-10-CM

## 2023-07-06 DIAGNOSIS — Z51.81 ENCOUNTER FOR THERAPEUTIC DRUG MONITORING: ICD-10-CM

## 2023-07-13 ENCOUNTER — LAB (OUTPATIENT)
Dept: LAB | Facility: CLINIC | Age: 38
End: 2023-07-13
Payer: COMMERCIAL

## 2023-07-13 DIAGNOSIS — F15.20 METHAMPHETAMINE ADDICTION (H): ICD-10-CM

## 2023-07-13 DIAGNOSIS — Z51.81 ENCOUNTER FOR THERAPEUTIC DRUG MONITORING: ICD-10-CM

## 2023-07-13 DIAGNOSIS — D64.9 ANEMIA, UNSPECIFIED TYPE: ICD-10-CM

## 2023-07-13 LAB
ALBUMIN SERPL BCG-MCNC: 4.3 G/DL (ref 3.5–5.2)
ALP SERPL-CCNC: 94 U/L (ref 35–104)
ALT SERPL W P-5'-P-CCNC: 17 U/L (ref 0–50)
ANION GAP SERPL CALCULATED.3IONS-SCNC: 8 MMOL/L (ref 7–15)
AST SERPL W P-5'-P-CCNC: 17 U/L (ref 0–45)
BASOPHILS # BLD AUTO: 0 10E3/UL (ref 0–0.2)
BASOPHILS NFR BLD AUTO: 0 %
BILIRUB SERPL-MCNC: 0.4 MG/DL
BUN SERPL-MCNC: 10.3 MG/DL (ref 6–20)
CALCIUM SERPL-MCNC: 9.1 MG/DL (ref 8.6–10)
CHLORIDE SERPL-SCNC: 107 MMOL/L (ref 98–107)
CHOLEST SERPL-MCNC: 156 MG/DL
CREAT SERPL-MCNC: 0.96 MG/DL (ref 0.51–0.95)
DEPRECATED HCO3 PLAS-SCNC: 24 MMOL/L (ref 22–29)
EOSINOPHIL # BLD AUTO: 0.2 10E3/UL (ref 0–0.7)
EOSINOPHIL NFR BLD AUTO: 3 %
ERYTHROCYTE [DISTWIDTH] IN BLOOD BY AUTOMATED COUNT: 14.1 % (ref 10–15)
GFR SERPL CREATININE-BSD FRML MDRD: 76 ML/MIN/1.73M2
GLUCOSE SERPL-MCNC: 97 MG/DL (ref 70–99)
HCT VFR BLD AUTO: 42.5 % (ref 35–47)
HDLC SERPL-MCNC: 48 MG/DL
HGB BLD-MCNC: 13.7 G/DL (ref 11.7–15.7)
IMM GRANULOCYTES # BLD: 0 10E3/UL
IMM GRANULOCYTES NFR BLD: 0 %
LDLC SERPL CALC-MCNC: 91 MG/DL
LYMPHOCYTES # BLD AUTO: 2.2 10E3/UL (ref 0.8–5.3)
LYMPHOCYTES NFR BLD AUTO: 32 %
MCH RBC QN AUTO: 28 PG (ref 26.5–33)
MCHC RBC AUTO-ENTMCNC: 32.2 G/DL (ref 31.5–36.5)
MCV RBC AUTO: 87 FL (ref 78–100)
MONOCYTES # BLD AUTO: 0.4 10E3/UL (ref 0–1.3)
MONOCYTES NFR BLD AUTO: 6 %
NEUTROPHILS # BLD AUTO: 4.1 10E3/UL (ref 1.6–8.3)
NEUTROPHILS NFR BLD AUTO: 59 %
NONHDLC SERPL-MCNC: 108 MG/DL
PLATELET # BLD AUTO: 344 10E3/UL (ref 150–450)
POTASSIUM SERPL-SCNC: 3.8 MMOL/L (ref 3.4–5.3)
PROT SERPL-MCNC: 6.8 G/DL (ref 6.4–8.3)
RBC # BLD AUTO: 4.9 10E6/UL (ref 3.8–5.2)
SODIUM SERPL-SCNC: 139 MMOL/L (ref 136–145)
TRIGL SERPL-MCNC: 87 MG/DL
TSH SERPL DL<=0.005 MIU/L-ACNC: 2.84 UIU/ML (ref 0.3–4.2)
VIT B12 SERPL-MCNC: 366 PG/ML (ref 232–1245)
WBC # BLD AUTO: 6.9 10E3/UL (ref 4–11)

## 2023-07-13 PROCEDURE — 80053 COMPREHEN METABOLIC PANEL: CPT

## 2023-07-13 PROCEDURE — 84443 ASSAY THYROID STIM HORMONE: CPT

## 2023-07-13 PROCEDURE — 82607 VITAMIN B-12: CPT

## 2023-07-13 PROCEDURE — 80061 LIPID PANEL: CPT

## 2023-07-13 PROCEDURE — 82306 VITAMIN D 25 HYDROXY: CPT

## 2023-07-13 PROCEDURE — 36415 COLL VENOUS BLD VENIPUNCTURE: CPT

## 2023-07-13 PROCEDURE — 85025 COMPLETE CBC W/AUTO DIFF WBC: CPT

## 2023-07-14 LAB — DEPRECATED CALCIDIOL+CALCIFEROL SERPL-MC: 54 UG/L (ref 20–75)

## 2023-08-06 ENCOUNTER — TELEPHONE (OUTPATIENT)
Dept: OBGYN | Facility: CLINIC | Age: 38
End: 2023-08-06
Payer: COMMERCIAL

## 2023-08-06 NOTE — TELEPHONE ENCOUNTER
.Prior Authorization Retail Medication Request    Medication/Dose: Omeprazole 40mg   ICD code (if different than what is on RX):   Previously Tried and Failed:    Rationale:      Insurance Name: [facility]   Insurance ID: [id number]       Pharmacy Information (if different than what is on RX)  Name:    Phone:        Per ins:  DAYS SUPPLY  DAYS    .Thank You,   Vilma Buck, [facility] [place]

## 2023-08-08 NOTE — TELEPHONE ENCOUNTER
Discontinue Gemfibrozil     Risk vs benefit     Restart per PCP recs Dr Worrell and Holley have seen her for this (see office notes) and they are both out of office this week, should she come in again? Phone visit?

## 2023-08-09 NOTE — TELEPHONE ENCOUNTER
Central Prior Authorization Team   Phone: [phone]    PA Initiation    Medication:   Insurance Company: [facility] - Phone [phone] Fax [phone]  Pharmacy Filling the Rx: [place], [place] - [address] [place] [address]  Filling Pharmacy Phone: [phone]  Filling Pharmacy Fax: [phone]  Start Date: 8/9/2023

## 2023-08-10 NOTE — TELEPHONE ENCOUNTER
Prior Authorization Approval    Authorization Effective Date: 5/11/2023  Authorization Expiration Date: 8/9/2024  Medication: OMEPRAZOLE - APPROVED  Approved Dose/Quantity:    Reference #:     Insurance Company: [employer] [place] - Phone [phone] Fax [phone]  Expected CoPay:       CoPay Card Available:      Foundation Assistance Needed:    Which Pharmacy is filling the prescription (Not needed for infusion/clinic administered): [facility] [place], [place] - [address] [place] [address]  Pharmacy Notified: Yes  Patient Notified: Yes  **Instructed pharmacy to notify patient when script is ready to /ship.**

## 2023-10-01 NOTE — PROGRESS NOTES
Chief Complaint   Patient presents with    Ent Problem     6 month Recheck with Audio per patient     History of Present Illness  Obdulia Black is a 37 year old female who presents today for follow-up.  The patient went to the operating room and underwent bilateral myringotomy with tube placement and bilateral endoscopic eustachian tube dilation on 2/3/2022.  The patient was last seen on 3/6/2023 with ear tubes in good placement. She returns today for routine ear tube follow-up.       Since last seeing the patient, the patient reports that the ears are stable.  Her hearing is at baseline.  She does have a history of TMJ and does wear a mouthguard.  She currently denies any otalgia, otorrhea, bloody otorrhea. The patient is otherwise doing well and has no ENT related concerns.    Past Medical History  Patient Active Problem List   Diagnosis    Esophageal reflux    CARDIOVASCULAR SCREENING; LDL GOAL LESS THAN 160    Tobacco use disorder    Allergic rhinitis    Encounter for insertion of mirena IUD    Numbness and tingling of both upper extremities while sleeping    Prenatal care, subsequent pregnancy    Multigravida of advanced maternal age in third trimester    Club foot of fetus affecting antepartum care of mother, single or unspecified fetus    Methamphetamine addiction (H)    Class 2 severe obesity due to excess calories with serious comorbidity in adult (H)     Current Medications    Current Outpatient Medications:     acetaminophen (TYLENOL) 325 MG tablet, Take 2 tablets (650 mg) by mouth every 6 hours as needed for mild pain Start after Delivery., Disp: 100 tablet, Rfl: 0    buPROPion (WELLBUTRIN XL) 300 MG 24 hr tablet, , Disp: , Rfl:     cetirizine (ZYRTEC) 10 MG tablet, Take 10 mg by mouth daily PRN, Disp: , Rfl:     Cholecalciferol (VITAMIN D3) 50 MCG (2000 UT) CAPS, , Disp: , Rfl:     FLUoxetine (PROZAC) 40 MG capsule, , Disp: , Rfl:     levonorgestrel (MIRENA) 20 MCG/DAY IUD, 1 each (20 mcg) by  Intrauterine route once, Disp: , Rfl:     omeprazole (PRILOSEC) 40 MG DR capsule, Take 1 capsule (40 mg) by mouth daily, Disp: 90 capsule, Rfl: 4    topiramate (TOPAMAX) 50 MG tablet, , Disp: , Rfl:     traZODone (DESYREL) 100 MG tablet, , Disp: , Rfl:     fluticasone (FLONASE) 50 MCG/ACT nasal spray, Spray 2 sprays in nostril daily (Patient not taking: Reported on 10/4/2023), Disp: 1 Package, Rfl: 11    Allergies  Allergies   Allergen Reactions    Sulfa Antibiotics Itching and Swelling       Social History  Social History     Socioeconomic History    Marital status:    Tobacco Use    Smoking status: Former     Packs/day: 0.00     Years: 10.00     Pack years: 0.00     Types: Cigarettes     Quit date: 2008     Years since quittin.7    Smokeless tobacco: Never    Tobacco comments:     0   Vaping Use    Vaping Use: Never used   Substance and Sexual Activity    Alcohol use: Not Currently     Comment: rare    Drug use: Not Currently     Types: Marijuana     Comment: last use     Sexual activity: Yes     Partners: Male     Comment:     Other Topics Concern    Parent/sibling w/ CABG, MI or angioplasty before 65F 55M? Yes       Family History  Family History   Problem Relation Age of Onset    Anemia Mother     Depression Mother     Anxiety Disorder Mother     Hypertension Father     Rheumatoid Arthritis Father     Hypertension Maternal Grandmother     Other Cancer Maternal Grandmother         leukemia    Gastrointestinal Disease Maternal Grandmother         diverticulitis    Thyroid Disease Maternal Grandmother     Diabetes Maternal Grandfather     Cerebrovascular Disease Maternal Grandfather     Heart Disease Maternal Grandfather         MI- open heart surgery    Eye Disorder Maternal Grandfather     Depression Maternal Grandfather     Cancer Paternal Grandmother         leukemia    Obesity Paternal Grandmother     Prostate Cancer Paternal Grandfather     Alzheimer Disease Paternal Grandfather      Substance Abuse Paternal Grandfather         alcohol    Substance Abuse Brother         prescription pills    Asthma Sister         Half sister    Diabetes Maternal Uncle     Lupus Other        Review of Systems  As per HPI and PMHx, otherwise 10 system review including the head and neck, constitutional, eyes, respiratory, GI, skin, neurologic, lymphatic, endocrine, and allergy systems is negative.    Physical Exam  /81   Pulse 77   Temp 98.5  F (36.9  C) (Tympanic)   Resp 16   Wt 93 kg (205 lb)   SpO2 97%   BMI 35.74 kg/m    GENERAL: Patient is a pleasant, cooperative 37 year old female in no acute distress.  HEAD: Normocephalic, atraumatic.  Hair and scalp are normal.  EYES: Pupils are equal, round, reactive to light and accommodation.  Extraocular movements are intact.  The sclera nonicteric without injection.  The extraocular structures are normal.  EARS: Normal shape and symmetry.  No tenderness when palpating the mastoid or tragal areas bilaterally.  Otoscopic exam on the right reveals clear canal.  The right tympanic membrane is round, intact without evidence of effusion, good landmarks.  Otoscopic exam on the left reveals an extruded Dura-Vent ear tube encased in cerumen sitting in the canal.  The Dura-Vent tube and cerumen are grasped with an alligator forceps and removed.  The left tympanic membrane is round, intact without evidence of effusion, good landmarks.    NOSE: Nares are patent.  Nasal mucosa is pink and moist.  Negative anterior rhinoscopy.  NEUROLOGIC: Cranial nerves II through XII are grossly intact.  Voice is strong.  Patient is House-Brackmann I/VI bilaterally.  CARDIOVASCULAR: Extremities are warm and well-perfused.  No significant peripheral edema.  RESPIRATORY: Patient has nonlabored breathing without cough, wheeze, stridor.  PSYCHIATRIC: Patient is alert and oriented.  Mood and affect appear normal.  SKIN: Warm and dry.  No scalp, face, or neck lesions  noted.    Audiogram  The patient underwent an audiogram performed today.  My review of the audiogram shows borderline normal hearing in both ears.  Pure-tone average is 21 dB on the right and 24 dB on the left.  Speech reception threshold is 20 dB on the right and 20 dB on the left.  The patient had 100% word recognition on the right and 100% word recognition on the left.  The patient had a type A tympanogram on the right and a type A tympanogram on the left.  Acoustic reflexes are present ipsilaterally and contralaterally in both ears.    Assessment and Plan     ICD-10-CM    1. History of eustachian tube dysfunction  Z86.69       2. Normal ear exam  Z01.10       3. History of allergic rhinitis  Z87.09 Adult Audiology  Referral      4. Retained myringotomy tube in right ear  Z96.22 Adult Audiology  Referral      5. Retained myringotomy tube in left ear  Z96.22 Adult Audiology  Referral         It was my pleasure seeing Obdulia Black today in clinic.  The patient is doing well.  Both ear tubes have extruded and the eardrums have healed.  Fortunately, she no longer has negative pressure and her hearing is at baseline.  At this point in time, I would recommend observation.  We discussed instructions for flying.  We discussed the trial and use of an Otovent.  We can see the patient back in the future as needed with ear symptoms.      Maxx Feliciano MD  Department of Otolaryngology-Head and Neck Surgery  [facility]

## 2023-10-04 ENCOUNTER — OFFICE VISIT (OUTPATIENT)
Dept: AUDIOLOGY | Facility: CLINIC | Age: 38
End: 2023-10-04
Payer: COMMERCIAL

## 2023-10-04 ENCOUNTER — OFFICE VISIT (OUTPATIENT)
Dept: OTOLARYNGOLOGY | Facility: CLINIC | Age: 38
End: 2023-10-04
Payer: COMMERCIAL

## 2023-10-04 VITALS
OXYGEN SATURATION: 97 % | SYSTOLIC BLOOD PRESSURE: 115 MMHG | WEIGHT: 205 LBS | RESPIRATION RATE: 16 BRPM | BODY MASS INDEX: 35.74 KG/M2 | DIASTOLIC BLOOD PRESSURE: 81 MMHG | TEMPERATURE: 98.5 F | HEART RATE: 77 BPM

## 2023-10-04 DIAGNOSIS — Z96.22 RETAINED MYRINGOTOMY TUBE IN LEFT EAR: ICD-10-CM

## 2023-10-04 DIAGNOSIS — Z96.22 RETAINED MYRINGOTOMY TUBE IN RIGHT EAR: ICD-10-CM

## 2023-10-04 DIAGNOSIS — Z87.09 HISTORY OF ALLERGIC RHINITIS: ICD-10-CM

## 2023-10-04 DIAGNOSIS — Z01.10 NORMAL EAR EXAM: ICD-10-CM

## 2023-10-04 DIAGNOSIS — Z86.69 HISTORY OF EUSTACHIAN TUBE DYSFUNCTION: Primary | ICD-10-CM

## 2023-10-04 DIAGNOSIS — H69.93 DISORDER OF BOTH EUSTACHIAN TUBES: Primary | ICD-10-CM

## 2023-10-04 PROBLEM — E66.01 CLASS 2 SEVERE OBESITY DUE TO EXCESS CALORIES WITH SERIOUS COMORBIDITY IN ADULT (H): Status: ACTIVE | Noted: 2023-10-04

## 2023-10-04 PROBLEM — E66.812 CLASS 2 SEVERE OBESITY DUE TO EXCESS CALORIES WITH SERIOUS COMORBIDITY IN ADULT (H): Status: ACTIVE | Noted: 2023-10-04

## 2023-10-04 PROCEDURE — 99213 OFFICE O/P EST LOW 20 MIN: CPT | Performed by: OTOLARYNGOLOGY

## 2023-10-04 PROCEDURE — 92557 COMPREHENSIVE HEARING TEST: CPT | Performed by: AUDIOLOGIST

## 2023-10-04 PROCEDURE — 92550 TYMPANOMETRY & REFLEX THRESH: CPT | Performed by: AUDIOLOGIST

## 2023-10-04 NOTE — LETTER
10/4/2023         RE: Obdulia Black  [address] [place] [address]        Dear Colleague,    Thank you for referring your patient, Obdulia Black, to the [facility]. Please see a copy of my visit note below.    Chief Complaint   Patient presents with     Ent Problem     6 month Recheck with Audio per patient     History of Present Illness  Obdulia Black is a 37 year old female who presents today for follow-up.  The patient went to the operating room and underwent bilateral myringotomy with tube placement and bilateral endoscopic eustachian tube dilation on 2/3/2022.  The patient was last seen on 3/6/2023 with ear tubes in good placement. She returns today for routine ear tube follow-up.       Since last seeing the patient, the patient reports that the ears are stable.  Her hearing is at baseline.  She does have a history of TMJ and does wear a mouthguard.  She currently denies any otalgia, otorrhea, bloody otorrhea. The patient is otherwise doing well and has no ENT related concerns.    Past Medical History  Patient Active Problem List   Diagnosis     Esophageal reflux     CARDIOVASCULAR SCREENING; LDL GOAL LESS THAN 160     Tobacco use disorder     Allergic rhinitis     Encounter for insertion of mirena IUD     Numbness and tingling of both upper extremities while sleeping     Prenatal care, subsequent pregnancy     Multigravida of advanced maternal age in third trimester     Club foot of fetus affecting antepartum care of mother, single or unspecified fetus     Methamphetamine addiction (H)     Class 2 severe obesity due to excess calories with serious comorbidity in adult (H)     Current Medications    Current Outpatient Medications:      acetaminophen (TYLENOL) 325 MG tablet, Take 2 tablets (650 mg) by mouth every 6 hours as needed for mild pain Start after Delivery., Disp: 100 tablet, Rfl: 0     buPROPion (WELLBUTRIN XL) 300 MG 24 hr tablet, , Disp: , Rfl:       cetirizine (ZYRTEC) 10 MG tablet, Take 10 mg by mouth daily PRN, Disp: , Rfl:      Cholecalciferol (VITAMIN D3) 50 MCG ( UT) CAPS, , Disp: , Rfl:      FLUoxetine (PROZAC) 40 MG capsule, , Disp: , Rfl:      levonorgestrel (MIRENA) 20 MCG/DAY IUD, 1 each (20 mcg) by Intrauterine route once, Disp: , Rfl:      omeprazole (PRILOSEC) 40 MG DR capsule, Take 1 capsule (40 mg) by mouth daily, Disp: 90 capsule, Rfl: 4     topiramate (TOPAMAX) 50 MG tablet, , Disp: , Rfl:      traZODone (DESYREL) 100 MG tablet, , Disp: , Rfl:      fluticasone (FLONASE) 50 MCG/ACT nasal spray, Spray 2 sprays in nostril daily (Patient not taking: Reported on 10/4/2023), Disp: 1 Package, Rfl: 11    Allergies  Allergies   Allergen Reactions     Sulfa Antibiotics Itching and Swelling       Social History  Social History     Socioeconomic History     Marital status:    Tobacco Use     Smoking status: Former     Packs/day: 0.00     Years: 10.00     Pack years: 0.00     Types: Cigarettes     Quit date: 2008     Years since quittin.7     Smokeless tobacco: Never     Tobacco comments:     0   Vaping Use     Vaping Use: Never used   Substance and Sexual Activity     Alcohol use: Not Currently     Comment: rare     Drug use: Not Currently     Types: Marijuana     Comment: last use      Sexual activity: Yes     Partners: Male     Comment:     Other Topics Concern     Parent/sibling w/ CABG, MI or angioplasty before 65F 55M? Yes       Family History  Family History   Problem Relation Age of Onset     Anemia Mother      Depression Mother      Anxiety Disorder Mother      Hypertension Father      Rheumatoid Arthritis Father      Hypertension Maternal Grandmother      Other Cancer Maternal Grandmother         leukemia     Gastrointestinal Disease Maternal Grandmother         diverticulitis     Thyroid Disease Maternal Grandmother      Diabetes Maternal Grandfather      Cerebrovascular Disease Maternal Grandfather      Heart  Disease Maternal Grandfather         MI- open heart surgery     Eye Disorder Maternal Grandfather      Depression Maternal Grandfather      Cancer Paternal Grandmother         leukemia     Obesity Paternal Grandmother      Prostate Cancer Paternal Grandfather      Alzheimer Disease Paternal Grandfather      Substance Abuse Paternal Grandfather         alcohol     Substance Abuse Brother         prescription pills     Asthma Sister         Half sister     Diabetes Maternal Uncle      Lupus Other        Review of Systems  As per HPI and PMHx, otherwise 10 system review including the head and neck, constitutional, eyes, respiratory, GI, skin, neurologic, lymphatic, endocrine, and allergy systems is negative.    Physical Exam  /81   Pulse 77   Temp 98.5  F (36.9  C) (Tympanic)   Resp 16   Wt 93 kg (205 lb)   SpO2 97%   BMI 35.74 kg/m    GENERAL: Patient is a pleasant, cooperative 37 year old female in no acute distress.  HEAD: Normocephalic, atraumatic.  Hair and scalp are normal.  EYES: Pupils are equal, round, reactive to light and accommodation.  Extraocular movements are intact.  The sclera nonicteric without injection.  The extraocular structures are normal.  EARS: Normal shape and symmetry.  No tenderness when palpating the mastoid or tragal areas bilaterally.  Otoscopic exam on the right reveals clear canal.  The right tympanic membrane is round, intact without evidence of effusion, good landmarks.  Otoscopic exam on the left reveals an extruded Dura-Vent ear tube encased in cerumen sitting in the canal.  The Dura-Vent tube and cerumen are grasped with an alligator forceps and removed.  The left tympanic membrane is round, intact without evidence of effusion, good landmarks.    NOSE: Nares are patent.  Nasal mucosa is pink and moist.  Negative anterior rhinoscopy.  NEUROLOGIC: Cranial nerves II through XII are grossly intact.  Voice is strong.  Patient is House-Brackmann I/VI  bilaterally.  CARDIOVASCULAR: Extremities are warm and well-perfused.  No significant peripheral edema.  RESPIRATORY: Patient has nonlabored breathing without cough, wheeze, stridor.  PSYCHIATRIC: Patient is alert and oriented.  Mood and affect appear normal.  SKIN: Warm and dry.  No scalp, face, or neck lesions noted.    Audiogram  The patient underwent an audiogram performed today.  My review of the audiogram shows borderline normal hearing in both ears.  Pure-tone average is 21 dB on the right and 24 dB on the left.  Speech reception threshold is 20 dB on the right and 20 dB on the left.  The patient had 100% word recognition on the right and 100% word recognition on the left.  The patient had a type A tympanogram on the right and a type A tympanogram on the left.  Acoustic reflexes are present ipsilaterally and contralaterally in both ears.    Assessment and Plan     ICD-10-CM    1. History of eustachian tube dysfunction  Z86.69       2. Normal ear exam  Z01.10       3. History of allergic rhinitis  Z87.09 Adult Audiology  Referral      4. Retained myringotomy tube in right ear  Z96.22 Adult Audiology  Referral      5. Retained myringotomy tube in left ear  Z96.22 Adult Audiology  Referral         It was my pleasure seeing Obdulia Black today in clinic.  The patient is doing well.  Both ear tubes have extruded and the eardrums have healed.  Fortunately, she no longer has negative pressure and her hearing is at baseline.  At this point in time, I would recommend observation.  We discussed instructions for flying.  We discussed the trial and use of an Otovent.  We can see the patient back in the future as needed with ear symptoms.      Maxx Feliciano MD  Department of Otolaryngology-Head and Neck Surgery  [facility]       Again, thank you for allowing me to participate in the care of your patient.        Sincerely,        Maxx Feliciano MD

## 2023-10-04 NOTE — NURSING NOTE
Chief Complaint   Patient presents with    Ent Problem     6 month Recheck with Audio per patient       Vitals:    10/04/23 0858   Pulse: 77   Resp: 16   Temp: 98.5  F (36.9  C)   TempSrc: Tympanic   SpO2: 97%     Wt Readings from Last 1 Encounters:   06/29/23 98 kg (216 lb)   Lisa Espinoza MA

## 2023-10-04 NOTE — PATIENT INSTRUCTIONS
Ear instructions before flying   1) Oral decongestant (Sudafed or Coricidin HBP with high blood pressure) within 6 hours before the flight  2) Afrin (oxymetazoline) 1-2 hours before flight  3) Filtered ear plugs (EarPlanes) on take of and landing   4) Chew gum on take of and landing     Can try Otovent to pop ear

## 2023-10-04 NOTE — PROGRESS NOTES
AUDIOLOGY REPORT:    Patient was referred to [facility] from ENT by Dr. Feliciano for a hearing examination. Patient reports an extensive history of bilateral eustachian tube issues with severe tympanic membrane ruptures and PE tube placements. Patient was unaccompanied to today's visit.     Testing:    Otoscopy:   Otoscopic exam indicates minimal cerumen bilaterally with a visibly extruded PE tube in the left ear canal     Tympanograms:    RIGHT: normal eardrum mobility     LEFT:   normal eardrum mobility    Reflexes (reported by stimulus ear): 1000 Hz  RIGHT: Ipsilateral is present at normal levels  RIGHT: Contralateral is present at normal levels  LEFT:   Ipsilateral is present at normal levels  LEFT:   Contralateral is present at normal levels    Thresholds:   Pure Tone Thresholds assessed using standard techniques audiometry with good  reliability from 250-8000 Hz bilaterally using insert earphones and circumaural headphones     RIGHT:  normal and borderline-normal hearing sensitivity for all frequencies tested with the exception of a mild sensorineural hearing loss at 500 Hz    LEFT:    normal and borderline-normal hearing sensitivity for all frequencies tested with the exception of a mild sensorineural hearing loss at 500 Hz   NOTE: Change in transducers did not merit a change in thresholds.     Speech Reception Threshold:    RIGHT: 20 dB HL    LEFT:   20 dB HL    Word Recognition Score:     RIGHT: 100% at 60 dB HL using NU-6 recorded word list.    LEFT:   100% at 60 dB HL using NU-6 recorded word list.    Discussed results with the patient.     Patient was returned to ENT for follow up.     Humphrey Brothers CCC-A  Licensed Audiologist  10/4/2023

## 2024-03-24 ENCOUNTER — HEALTH MAINTENANCE LETTER (OUTPATIENT)
Age: 39
End: 2024-03-24

## 2024-05-30 ENCOUNTER — PATIENT OUTREACH (OUTPATIENT)
Dept: CARE COORDINATION | Facility: CLINIC | Age: 39
End: 2024-05-30

## 2024-06-13 ENCOUNTER — PATIENT OUTREACH (OUTPATIENT)
Dept: CARE COORDINATION | Facility: CLINIC | Age: 39
End: 2024-06-13

## 2024-08-11 ENCOUNTER — HEALTH MAINTENANCE LETTER (OUTPATIENT)
Age: 39
End: 2024-08-11

## 2024-09-30 NOTE — TELEPHONE ENCOUNTER
"Requested Prescriptions   Pending Prescriptions Disp Refills     norethindrone (MICRONOR) 0.35 MG per tablet [Pharmacy Med Name: NORETHINDRON 0.35MG TAB SANDRITA]  Last Written Prescription Date:  07/10/17  Last Fill Quantity: 84,  # refills: 3   Last office visit: 7/10/2017 with prescribing provider:  07/10/17   Future Office Visit:     84 tablet 2     Sig: TAKE ONE TABLET BY MOUTH ONE TIME DAILY    Contraceptives Protocol Failed    7/21/2018 12:21 PM       Failed - Recent (12 mo) or future (30 days) visit within the authorizing provider's specialty    Patient had office visit in the last 12 months or has a visit in the next 30 days with authorizing provider or within the authorizing provider's specialty.  See \"Patient Info\" tab in inbasket, or \"Choose Columns\" in Meds & Orders section of the refill encounter.           Passed - Patient is not a current smoker if age is 35 or older       Passed - No active pregnancy on record       Passed - No positive pregnancy test in past 12 months          "
Refill given to cover until appt in Sept 2018.KpavelRN  
DC instructions

## 2024-10-15 NOTE — PROGRESS NOTES
Pt dc'd to home stable.  Verbalized understanding of discharge instructions.  Enc pt to call with concerns.    Refilled as I do not want the patient to be without  Please still schedule a follow up  Thank you

## 2024-11-11 SDOH — HEALTH STABILITY: PHYSICAL HEALTH: ON AVERAGE, HOW MANY MINUTES DO YOU ENGAGE IN EXERCISE AT THIS LEVEL?: 20 MIN

## 2024-11-11 SDOH — HEALTH STABILITY: PHYSICAL HEALTH: ON AVERAGE, HOW MANY DAYS PER WEEK DO YOU ENGAGE IN MODERATE TO STRENUOUS EXERCISE (LIKE A BRISK WALK)?: 1 DAY

## 2024-11-11 ASSESSMENT — PATIENT HEALTH QUESTIONNAIRE - PHQ9
10. IF YOU CHECKED OFF ANY PROBLEMS, HOW DIFFICULT HAVE THESE PROBLEMS MADE IT FOR YOU TO DO YOUR WORK, TAKE CARE OF THINGS AT HOME, OR GET ALONG WITH OTHER PEOPLE: SOMEWHAT DIFFICULT
SUM OF ALL RESPONSES TO PHQ QUESTIONS 1-9: 17
SUM OF ALL RESPONSES TO PHQ QUESTIONS 1-9: 17

## 2024-11-11 ASSESSMENT — SOCIAL DETERMINANTS OF HEALTH (SDOH): HOW OFTEN DO YOU GET TOGETHER WITH FRIENDS OR RELATIVES?: PATIENT DECLINED

## 2024-11-12 ENCOUNTER — OFFICE VISIT (OUTPATIENT)
Dept: FAMILY MEDICINE | Facility: CLINIC | Age: 39
End: 2024-11-12
Payer: COMMERCIAL

## 2024-11-12 VITALS
DIASTOLIC BLOOD PRESSURE: 60 MMHG | RESPIRATION RATE: 14 BRPM | SYSTOLIC BLOOD PRESSURE: 116 MMHG | HEIGHT: 64 IN | BODY MASS INDEX: 38.58 KG/M2 | HEART RATE: 85 BPM | WEIGHT: 226 LBS | TEMPERATURE: 97 F | OXYGEN SATURATION: 99 %

## 2024-11-12 DIAGNOSIS — F15.20 METHAMPHETAMINE ADDICTION (H): ICD-10-CM

## 2024-11-12 DIAGNOSIS — Z12.4 CERVICAL CANCER SCREENING: ICD-10-CM

## 2024-11-12 DIAGNOSIS — B37.2 CUTANEOUS CANDIDIASIS: ICD-10-CM

## 2024-11-12 DIAGNOSIS — D64.9 ANEMIA, UNSPECIFIED TYPE: ICD-10-CM

## 2024-11-12 DIAGNOSIS — K21.9 GASTROESOPHAGEAL REFLUX DISEASE WITHOUT ESOPHAGITIS: ICD-10-CM

## 2024-11-12 DIAGNOSIS — Z00.01 ENCOUNTER FOR ROUTINE ADULT PHYSICAL EXAM WITH ABNORMAL FINDINGS: Primary | ICD-10-CM

## 2024-11-12 DIAGNOSIS — Z12.31 VISIT FOR SCREENING MAMMOGRAM: ICD-10-CM

## 2024-11-12 PROBLEM — O09.523 MULTIGRAVIDA OF ADVANCED MATERNAL AGE IN THIRD TRIMESTER: Status: RESOLVED | Noted: 2022-04-05 | Resolved: 2024-11-12

## 2024-11-12 PROBLEM — Z34.80 PRENATAL CARE, SUBSEQUENT PREGNANCY: Status: RESOLVED | Noted: 2022-03-23 | Resolved: 2024-11-12

## 2024-11-12 PROBLEM — O35.HXX0: Status: RESOLVED | Noted: 2022-07-20 | Resolved: 2024-11-12

## 2024-11-12 LAB
ALBUMIN SERPL BCG-MCNC: 4 G/DL (ref 3.5–5.2)
ALP SERPL-CCNC: 87 U/L (ref 40–150)
ALT SERPL W P-5'-P-CCNC: 14 U/L (ref 0–50)
ANION GAP SERPL CALCULATED.3IONS-SCNC: 9 MMOL/L (ref 7–15)
AST SERPL W P-5'-P-CCNC: 17 U/L (ref 0–45)
BASOPHILS # BLD AUTO: 0 10E3/UL (ref 0–0.2)
BASOPHILS NFR BLD AUTO: 0 %
BILIRUB SERPL-MCNC: <0.2 MG/DL
BUN SERPL-MCNC: 11.3 MG/DL (ref 6–20)
CALCIUM SERPL-MCNC: 8.9 MG/DL (ref 8.8–10.4)
CHLORIDE SERPL-SCNC: 106 MMOL/L (ref 98–107)
CREAT SERPL-MCNC: 0.81 MG/DL (ref 0.51–0.95)
EGFRCR SERPLBLD CKD-EPI 2021: >90 ML/MIN/1.73M2
EOSINOPHIL # BLD AUTO: 0.2 10E3/UL (ref 0–0.7)
EOSINOPHIL NFR BLD AUTO: 2 %
ERYTHROCYTE [DISTWIDTH] IN BLOOD BY AUTOMATED COUNT: 14.1 % (ref 10–15)
GLUCOSE SERPL-MCNC: 93 MG/DL (ref 70–99)
HCO3 SERPL-SCNC: 25 MMOL/L (ref 22–29)
HCT VFR BLD AUTO: 40.4 % (ref 35–47)
HGB BLD-MCNC: 12.9 G/DL (ref 11.7–15.7)
IMM GRANULOCYTES # BLD: 0 10E3/UL
IMM GRANULOCYTES NFR BLD: 0 %
LYMPHOCYTES # BLD AUTO: 2.6 10E3/UL (ref 0.8–5.3)
LYMPHOCYTES NFR BLD AUTO: 29 %
MCH RBC QN AUTO: 27.4 PG (ref 26.5–33)
MCHC RBC AUTO-ENTMCNC: 31.9 G/DL (ref 31.5–36.5)
MCV RBC AUTO: 86 FL (ref 78–100)
MONOCYTES # BLD AUTO: 0.6 10E3/UL (ref 0–1.3)
MONOCYTES NFR BLD AUTO: 6 %
NEUTROPHILS # BLD AUTO: 5.8 10E3/UL (ref 1.6–8.3)
NEUTROPHILS NFR BLD AUTO: 63 %
PLATELET # BLD AUTO: 351 10E3/UL (ref 150–450)
POTASSIUM SERPL-SCNC: 3.9 MMOL/L (ref 3.4–5.3)
PROT SERPL-MCNC: 6.9 G/DL (ref 6.4–8.3)
RBC # BLD AUTO: 4.7 10E6/UL (ref 3.8–5.2)
SODIUM SERPL-SCNC: 140 MMOL/L (ref 135–145)
T4 FREE SERPL-MCNC: 0.95 NG/DL (ref 0.9–1.7)
TSH SERPL DL<=0.005 MIU/L-ACNC: 5.68 UIU/ML (ref 0.3–4.2)
WBC # BLD AUTO: 9.2 10E3/UL (ref 4–11)

## 2024-11-12 PROCEDURE — 80053 COMPREHEN METABOLIC PANEL: CPT | Performed by: NURSE PRACTITIONER

## 2024-11-12 PROCEDURE — 90632 HEPA VACCINE ADULT IM: CPT | Performed by: NURSE PRACTITIONER

## 2024-11-12 PROCEDURE — 84439 ASSAY OF FREE THYROXINE: CPT | Performed by: NURSE PRACTITIONER

## 2024-11-12 PROCEDURE — 85025 COMPLETE CBC W/AUTO DIFF WBC: CPT | Performed by: NURSE PRACTITIONER

## 2024-11-12 PROCEDURE — 87624 HPV HI-RISK TYP POOLED RSLT: CPT | Performed by: NURSE PRACTITIONER

## 2024-11-12 PROCEDURE — G0145 SCR C/V CYTO,THINLAYER,RESCR: HCPCS | Performed by: NURSE PRACTITIONER

## 2024-11-12 PROCEDURE — 99396 PREV VISIT EST AGE 40-64: CPT | Mod: 25 | Performed by: NURSE PRACTITIONER

## 2024-11-12 PROCEDURE — 99214 OFFICE O/P EST MOD 30 MIN: CPT | Mod: 25 | Performed by: NURSE PRACTITIONER

## 2024-11-12 PROCEDURE — 36415 COLL VENOUS BLD VENIPUNCTURE: CPT | Performed by: NURSE PRACTITIONER

## 2024-11-12 PROCEDURE — 90471 IMMUNIZATION ADMIN: CPT | Performed by: NURSE PRACTITIONER

## 2024-11-12 PROCEDURE — 84443 ASSAY THYROID STIM HORMONE: CPT | Performed by: NURSE PRACTITIONER

## 2024-11-12 RX ORDER — NALTREXONE HYDROCHLORIDE 50 MG/1
50 TABLET, FILM COATED ORAL DAILY
COMMUNITY
Start: 2024-10-17

## 2024-11-12 RX ORDER — NYSTATIN 100000 [USP'U]/G
POWDER TOPICAL DAILY
Qty: 60 G | Refills: 1 | Status: SHIPPED | OUTPATIENT
Start: 2024-11-12

## 2024-11-12 RX ORDER — OMEPRAZOLE 40 MG/1
40 CAPSULE, DELAYED RELEASE ORAL DAILY
Qty: 90 CAPSULE | Refills: 3 | Status: SHIPPED | OUTPATIENT
Start: 2024-11-12

## 2024-11-12 ASSESSMENT — PAIN SCALES - GENERAL: PAINLEVEL_OUTOF10: NO PAIN (0)

## 2024-11-12 NOTE — H&P (VIEW-ONLY)
"Preventive Care Visit  [facility]  NICOLA Pittman CNP, Family Medicine  Nov 12, 2024      Assessment & Plan     Physical with abnormal findings  Visit for screening mammogram    - MA Screening Bilateral w/ [place]; Future    Gastroesophageal reflux disease without esophagitis  Recent exacerbation.  Increase omeprazole to 40 mg daily  - omeprazole (PRILOSEC) 40 MG DR capsule; Take 1 capsule (40 mg) by mouth daily.  - Adult GI  Referral - Procedure Only; Future  EGD  Labs  - CBC with platelets and differential  - Comprehensive metabolic panel (BMP + Alb, Alk Phos, ALT, AST, Total. Bili, TP)  - TSH with free T4 reflex    Anemia, unspecified type  Follow-up anemia.  Labs done today  - CBC with platelets and differential; Future      Cutaneous candidiasis  Breast bilaterally groin  Symptomatic care strategies reviewed  Begin  - nystatin (MYCOSTATIN) 476881 UNIT/GM external powder; Apply topically daily.    Cervical cancer screening  Completed today.  IUD in place  - HPV and Gynecologic Cytology Panel - Recommended Age 30-65 Years    Methamphetamine addiction (H)  Sober x 1 year.  Attending NA meetings  Continue to follow-up with psychologist and psychiatrist as planned            BMI  Estimated body mass index is 39.41 kg/m  as calculated from the following:    Height as of this encounter: 1.613 m (5' 3.5\").    Weight as of this encounter: 102.5 kg (226 lb).   Weight management plan: Discussed healthy diet and exercise guidelines    Counseling  Appropriate preventive services were addressed with this patient via screening, questionnaire, or discussion as appropriate for fall prevention, nutrition, physical activity, Tobacco-use cessation, social engagement, weight loss and cognition.  Checklist reviewing preventive services available has been given to the patient.  Reviewed patient's diet, addressing concerns and/or questions.   She is at risk for lack of exercise and has " been provided with information to increase physical activity for the benefit of her well-being.   The patient was instructed to see the dentist every 6 months.   The patient's PHQ-9 score is consistent with moderate depression. She was provided with information regarding depression.       Work on weight loss  Regular exercise  Call or return to the clinic with any worsening of symptoms or no resolution. Patient/Parent verbalized understanding and is in agreement. Medication side effects reviewed.   Current Outpatient Medications   Medication Sig Dispense Refill    acetaminophen (TYLENOL) 325 MG tablet Take 2 tablets (650 mg) by mouth every 6 hours as needed for mild pain Start after Delivery. 100 tablet 0    buPROPion (WELLBUTRIN XL) 300 MG 24 hr tablet       cetirizine (ZYRTEC) 10 MG tablet Take 10 mg by mouth daily PRN      Cholecalciferol (VITAMIN D3) 50 MCG (2000 UT) CAPS       FLUoxetine (PROZAC) 20 MG capsule 20 mg.      FLUoxetine (PROZAC) 40 MG capsule       fluticasone (FLONASE) 50 MCG/ACT nasal spray Spray 2 sprays in nostril daily 1 Package 11    levonorgestrel (MIRENA) 20 MCG/DAY IUD 1 each (20 mcg) by Intrauterine route once      naltrexone (DEPADE/REVIA) 50 MG tablet Take 50 mg by mouth daily.      nystatin (MYCOSTATIN) 969686 UNIT/GM external powder Apply topically daily. 60 g 1    omeprazole (PRILOSEC) 40 MG DR capsule Take 1 capsule (40 mg) by mouth daily. 90 capsule 3    topiramate (TOPAMAX) 50 MG tablet       traZODone (DESYREL) 100 MG tablet        Chart documentation with Dragon Voice recognition Software. Although reviewed after completion, some words and grammatical errors may remain.  Liane Hodges MSN,FNP-[facility]  [id number] [place] [id number]  [phone]        See Patient Instructions    Subjective   Obdulia is a 39 year old, presenting for the following:  Physical        6/29/2023     1:41 PM   Additional Questions   Roomed by Krista           HPI  Sick for 4 weeks lost voice and hasn't been better  Now a cough for 1.5 week  Kids are sick as well.  GERD terrible on omeprazole now  Years since last year     [facility] feels some meds are not working and switching meds  [facility] in [place]     Sober x 1 yr  Going to NA meetings      Declined influenza and COVID vaccine  Hep A interest       Health Care Directive  Patient does not have a Health Care Directive:       11/11/2024   General Health   How would you rate your overall physical health? (!) FAIR   Feel stress (tense, anxious, or unable to sleep) Rather much      (!) STRESS CONCERN      11/11/2024   Nutrition   Three or more servings of calcium each day? Yes   Diet: Regular (no restrictions)   How many servings of fruit and vegetables per day? (!) 0-1   How many sweetened beverages each day? 0-1            11/11/2024   Exercise   Days per week of moderate/strenous exercise 1 day   Average minutes spent exercising at this level 20 min      (!) EXERCISE CONCERN      11/11/2024   Social Factors   Frequency of gathering with friends or relatives Patient declined   Worry food won't last until get money to buy more No   Food not last or not have enough money for food? No   Do you have housing? (Housing is defined as stable permanent housing and does not include staying ouside in a car, in a tent, in an abandoned building, in an overnight shelter, or couch-surfing.) Yes   Are you worried about losing your housing? No   Lack of transportation? No   Unable to get utilities (heat,electricity)? No            11/11/2024   Dental   Dentist two times every year? (!) NO            11/11/2024   TB Screening   Were you born outside of the [place]? No          Today's PHQ-9 Score:       11/11/2024     4:45 PM   PHQ-9 SCORE   PHQ-9 Total Score MyChart 17 (Moderately severe depression)   PHQ-9 Total Score 17        Patient-reported         11/11/2024   Substance Use   Alcohol more  than 3/day or more than 7/wk Not Applicable   Do you use any other substances recreationally? No        Social History     Tobacco Use    Smoking status: Former     Current packs/day: 0.00     Types: Cigarettes     Quit date: 1998     Years since quittin.4    Smokeless tobacco: Never    Tobacco comments:     0   Vaping Use    Vaping status: Never Used   Substance Use Topics    Alcohol use: Not Currently     Comment: rare    Drug use: Not Currently     Types: Cocaine, Marijuana, Methamphetamines, Opiates, Amphetamines, Benzodiazepines, MDMA (Ecstasy)     Comment: last use        Mammogram Screening - Mammogram every 1-2 years updated in Health Maintenance based on mutual decision making        2024   STI Screening   New sexual partner(s) since last STI/HIV test? No        History of abnormal Pap smear: No - age 30- 64 PAP with HPV every 5 years recommended        Latest Ref Rng & Units 2020     4:15 PM 2020     4:12 PM 2016    10:53 AM   PAP / HPV   PAP (Historical)   NIL     HPV 16 DNA NEG^Negative Negative   Negative    HPV 18 DNA NEG^Negative Negative   Negative    Other HR HPV NEG^Negative Negative   Negative      ASCVD Risk   The 10-year ASCVD risk score (Caitie ZEPEDA, et al., 2019) is: 0.4%    Values used to calculate the score:      Age: 39 years      Sex: Female      Is Non- : No      Diabetic: No      Tobacco smoker: No      Systolic Blood Pressure: 116 mmHg      Is BP treated: No      HDL Cholesterol: 48 mg/dL      Total Cholesterol: 156 mg/dL        2024   Contraception/Family Planning   Questions about contraception or family planning No        Reviewed and updated as needed this visit by Provider   Tobacco  Allergies  Meds  Problems  Med Hx  Surg Hx  Fam Hx            Past Medical History:   Diagnosis Date    Chickenpox     Depression     Depressive disorder     Moderate persistent asthma     Prenatal care, subsequent  pregnancy 2022     Past Surgical History:   Procedure Laterality Date    BIOPSY      ENDOSCOPIC BALLOON SINUPLASTY ACCLARENT Bilateral 2/3/2022    Procedure: ENDOSCOPIC EUSTACHAIN TUBE BALLOON DILATION;  Surgeon: Maxx Feliciano MD;  Location: [facility] OR    MOUTH SURGERY      wisdom teeth x 4    MYRINGOTOMY, INSERT TUBE BILATERAL, COMBINED Bilateral 2/3/2022    Procedure: MYRINGOTOMY, BILATERAL, WITH VENTILATION TUBE INSERTION;  Surgeon: Maxx Feliciano MD;  Location: [facility] OR    PE TUBES  3/02/92    SURGICAL HISTORY OF -   03    Excision of cyst on left middle finger    TONSILLECTOMY & ADENOIDECTOMY  1991     OB History    Para Term  AB Living   2 2 2 0 0 2   SAB IAB Ectopic Multiple Live Births   0 0 0 0 2      # Outcome Date GA Lbr Terrance/2nd Weight Sex Type Anes PTL Lv   2 Term 10/22/22 37w6d 06:13 / 00:56 3.085 kg (6 lb 12.8 oz) M Vag-Spont EPI N ANGELA      Name: HANSKARIE-JOY      Apgar1: 6  Apgar5: 7   1 Term 16 39w3d 21:15 / 03:36 3.011 kg (6 lb 10.2 oz) F Vag-Spont EPI, Local  ANGELA      Name: Catia      Apgar1: 7  Apgar5: 8     Labs reviewed in EPIC  BP Readings from Last 3 Encounters:   24 116/60   10/04/23 115/81   23 106/72    Wt Readings from Last 3 Encounters:   24 102.5 kg (226 lb)   10/04/23 93 kg (205 lb)   23 98 kg (216 lb)                  Patient Active Problem List   Diagnosis    Esophageal reflux    CARDIOVASCULAR SCREENING; LDL GOAL LESS THAN 160    Tobacco use disorder    Allergic rhinitis    Encounter for insertion of Mirena IUD    Methamphetamine addiction (H)    Class 2 severe obesity due to excess calories with serious comorbidity in adult (H)     Past Surgical History:   Procedure Laterality Date    BIOPSY      ENDOSCOPIC BALLOON SINUPLASTY ACCLARENT Bilateral 2/3/2022    Procedure: ENDOSCOPIC EUSTACHAIN TUBE BALLOON DILATION;  Surgeon: Maxx Feliciano MD;  Location: [facility]    MOUTH SURGERY      wisdom teeth x 4    MYRINGOTOMY,  INSERT TUBE BILATERAL, COMBINED Bilateral 2/3/2022    Procedure: MYRINGOTOMY, BILATERAL, WITH VENTILATION TUBE INSERTION;  Surgeon: Maxx Feliciano MD;  Location: [facility] OR    PE TUBES  3/02/92    SURGICAL HISTORY OF -   03    Excision of cyst on left middle finger    TONSILLECTOMY & ADENOIDECTOMY  1991       Social History     Tobacco Use    Smoking status: Former     Current packs/day: 0.00     Types: Cigarettes     Quit date: 1998     Years since quittin.4    Smokeless tobacco: Never    Tobacco comments:     0   Substance Use Topics    Alcohol use: Not Currently     Comment: rare     Family History   Problem Relation Age of Onset    Anemia Mother     Depression Mother     Anxiety Disorder Mother     Hypertension Father     Rheumatoid Arthritis Father     Hypertension Maternal Grandmother     Other Cancer Maternal Grandmother         leukemia    Gastrointestinal Disease Maternal Grandmother         diverticulitis    Thyroid Disease Maternal Grandmother     Diabetes Maternal Grandfather     Cerebrovascular Disease Maternal Grandfather     Heart Disease Maternal Grandfather         MI- open heart surgery    Eye Disorder Maternal Grandfather     Depression Maternal Grandfather     Cancer Paternal Grandmother         leukemia    Obesity Paternal Grandmother     Prostate Cancer Paternal Grandfather     Alzheimer Disease Paternal Grandfather     Substance Abuse Paternal Grandfather         alcohol    Substance Abuse Brother         prescription pills    Asthma Sister         Half sister    Diabetes Maternal Uncle     Lupus Other          Current Outpatient Medications   Medication Sig Dispense Refill    acetaminophen (TYLENOL) 325 MG tablet Take 2 tablets (650 mg) by mouth every 6 hours as needed for mild pain Start after Delivery. 100 tablet 0    buPROPion (WELLBUTRIN XL) 300 MG 24 hr tablet       cetirizine (ZYRTEC) 10 MG tablet Take 10 mg by mouth daily PRN      Cholecalciferol (VITAMIN D3) 50 MCG  "(2000 UT) CAPS       FLUoxetine (PROZAC) 20 MG capsule 20 mg.      FLUoxetine (PROZAC) 40 MG capsule       fluticasone (FLONASE) 50 MCG/ACT nasal spray Spray 2 sprays in nostril daily 1 Package 11    levonorgestrel (MIRENA) 20 MCG/DAY IUD 1 each (20 mcg) by Intrauterine route once      naltrexone (DEPADE/REVIA) 50 MG tablet Take 50 mg by mouth daily.      nystatin (MYCOSTATIN) 974344 UNIT/GM external powder Apply topically daily. 60 g 1    omeprazole (PRILOSEC) 40 MG DR capsule Take 1 capsule (40 mg) by mouth daily. 90 capsule 3    topiramate (TOPAMAX) 50 MG tablet       traZODone (DESYREL) 100 MG tablet        Allergies   Allergen Reactions    Sulfa Antibiotics Itching and Swelling         Review of Systems  Constitutional, neuro, ENT, endocrine, pulmonary, cardiac, gastrointestinal, genitourinary, musculoskeletal, integument and psychiatric systems are negative, except as otherwise noted.     Objective    Exam  /60   Pulse 85   Temp 97  F (36.1  C) (Tympanic)   Resp 14   Ht 1.613 m (5' 3.5\")   Wt 102.5 kg (226 lb)   LMP  (LMP Unknown)   SpO2 99%   BMI 39.41 kg/m     Estimated body mass index is 39.41 kg/m  as calculated from the following:    Height as of this encounter: 1.613 m (5' 3.5\").    Weight as of this encounter: 102.5 kg (226 lb).    Physical Exam  GENERAL: alert and no distress  EYES: Eyes grossly normal to inspection, PERRL and conjunctivae and sclerae normal  HENT: ear canals and TM's normal, nose and mouth without ulcers or lesions  NECK: no adenopathy, no asymmetry, masses, or scars  RESP: lungs clear to auscultation - no rales, rhonchi or wheezes  CV: regular rate and rhythm, normal S1 S2, no S3 or S4, no murmur, click or rub, no peripheral edema  ABDOMEN: soft, nontender, no hepatosplenomegaly, no masses and bowel sounds normal   (female) w/bimanual: normal female external genitalia, normal urethral meatus, normal vaginal mucosa, and normal cervix/adnexa/uterus without masses or " discharge IUD strings visualized Pap obtained  MS: no gross musculoskeletal defects noted, no edema  SKIN: no suspicious lesions or rashes  NEURO: Normal strength and tone, mentation intact and speech normal  PSYCH: mentation appears normal, affect normal/bright        Signed Electronically by: NICOLA Pittman CNP    Answers submitted by the patient for this visit:  Patient Health Questionnaire (Submitted on 11/11/2024)  If you checked off any problems, how difficult have these problems made it for you to do your work, take care of things at home, or get along with other people?: Somewhat difficult  PHQ9 TOTAL SCORE: 17

## 2024-11-12 NOTE — PROGRESS NOTES
"Preventive Care Visit  [facility]  NICOLA Pittman CNP, Family Medicine  Nov 12, 2024      Assessment & Plan     Physical with abnormal findings  Visit for screening mammogram    - MA Screening Bilateral w/ [place]; Future    Gastroesophageal reflux disease without esophagitis  Recent exacerbation.  Increase omeprazole to 40 mg daily  - omeprazole (PRILOSEC) 40 MG DR capsule; Take 1 capsule (40 mg) by mouth daily.  - Adult GI  Referral - Procedure Only; Future  EGD  Labs  - CBC with platelets and differential  - Comprehensive metabolic panel (BMP + Alb, Alk Phos, ALT, AST, Total. Bili, TP)  - TSH with free T4 reflex    Anemia, unspecified type  Follow-up anemia.  Labs done today  - CBC with platelets and differential; Future      Cutaneous candidiasis  Breast bilaterally groin  Symptomatic care strategies reviewed  Begin  - nystatin (MYCOSTATIN) 919261 UNIT/GM external powder; Apply topically daily.    Cervical cancer screening  Completed today.  IUD in place  - HPV and Gynecologic Cytology Panel - Recommended Age 30-65 Years    Methamphetamine addiction (H)  Sober x 1 year.  Attending NA meetings  Continue to follow-up with psychologist and psychiatrist as planned            BMI  Estimated body mass index is 39.41 kg/m  as calculated from the following:    Height as of this encounter: 1.613 m (5' 3.5\").    Weight as of this encounter: 102.5 kg (226 lb).   Weight management plan: Discussed healthy diet and exercise guidelines    Counseling  Appropriate preventive services were addressed with this patient via screening, questionnaire, or discussion as appropriate for fall prevention, nutrition, physical activity, Tobacco-use cessation, social engagement, weight loss and cognition.  Checklist reviewing preventive services available has been given to the patient.  Reviewed patient's diet, addressing concerns and/or questions.   She is at risk for lack of exercise and has " been provided with information to increase physical activity for the benefit of her well-being.   The patient was instructed to see the dentist every 6 months.   The patient's PHQ-9 score is consistent with moderate depression. She was provided with information regarding depression.       Work on weight loss  Regular exercise  Call or return to the clinic with any worsening of symptoms or no resolution. Patient/Parent verbalized understanding and is in agreement. Medication side effects reviewed.   Current Outpatient Medications   Medication Sig Dispense Refill    acetaminophen (TYLENOL) 325 MG tablet Take 2 tablets (650 mg) by mouth every 6 hours as needed for mild pain Start after Delivery. 100 tablet 0    buPROPion (WELLBUTRIN XL) 300 MG 24 hr tablet       cetirizine (ZYRTEC) 10 MG tablet Take 10 mg by mouth daily PRN      Cholecalciferol (VITAMIN D3) 50 MCG (2000 UT) CAPS       FLUoxetine (PROZAC) 20 MG capsule 20 mg.      FLUoxetine (PROZAC) 40 MG capsule       fluticasone (FLONASE) 50 MCG/ACT nasal spray Spray 2 sprays in nostril daily 1 Package 11    levonorgestrel (MIRENA) 20 MCG/DAY IUD 1 each (20 mcg) by Intrauterine route once      naltrexone (DEPADE/REVIA) 50 MG tablet Take 50 mg by mouth daily.      nystatin (MYCOSTATIN) 824336 UNIT/GM external powder Apply topically daily. 60 g 1    omeprazole (PRILOSEC) 40 MG DR capsule Take 1 capsule (40 mg) by mouth daily. 90 capsule 3    topiramate (TOPAMAX) 50 MG tablet       traZODone (DESYREL) 100 MG tablet        Chart documentation with Dragon Voice recognition Software. Although reviewed after completion, some words and grammatical errors may remain.  Liane Hodges MSN,FNP-[facility]  [id number] [place] [id number]  [phone]        See Patient Instructions    Subjective   Obdulia is a 39 year old, presenting for the following:  Physical        6/29/2023     1:41 PM   Additional Questions   Roomed by Krista           HPI  Sick for 4 weeks lost voice and hasn't been better  Now a cough for 1.5 week  Kids are sick as well.  GERD terrible on omeprazole now  Years since last year     [facility] mental health feels some meds are not working and switching meds  [facility] in [place]     Sober x 1 yr  Going to NA meetings      Declined influenza and COVID vaccine  Hep A interest       Health Care Directive  Patient does not have a Health Care Directive:       11/11/2024   General Health   How would you rate your overall physical health? (!) FAIR   Feel stress (tense, anxious, or unable to sleep) Rather much      (!) STRESS CONCERN      11/11/2024   Nutrition   Three or more servings of calcium each day? Yes   Diet: Regular (no restrictions)   How many servings of fruit and vegetables per day? (!) 0-1   How many sweetened beverages each day? 0-1            11/11/2024   Exercise   Days per week of moderate/strenous exercise 1 day   Average minutes spent exercising at this level 20 min      (!) EXERCISE CONCERN      11/11/2024   Social Factors   Frequency of gathering with friends or relatives Patient declined   Worry food won't last until get money to buy more No   Food not last or not have enough money for food? No   Do you have housing? (Housing is defined as stable permanent housing and does not include staying ouside in a car, in a tent, in an abandoned building, in an overnight shelter, or couch-surfing.) Yes   Are you worried about losing your housing? No   Lack of transportation? No   Unable to get utilities (heat,electricity)? No            11/11/2024   Dental   Dentist two times every year? (!) NO            11/11/2024   TB Screening   Were you born outside of the [place]? No          Today's PHQ-9 Score:       11/11/2024     4:45 PM   PHQ-9 SCORE   PHQ-9 Total Score MyChart 17 (Moderately severe depression)   PHQ-9 Total Score 17        Patient-reported         11/11/2024   Substance Use   Alcohol more  than 3/day or more than 7/wk Not Applicable   Do you use any other substances recreationally? No        Social History     Tobacco Use    Smoking status: Former     Current packs/day: 0.00     Types: Cigarettes     Quit date: 1998     Years since quittin.4    Smokeless tobacco: Never    Tobacco comments:     0   Vaping Use    Vaping status: Never Used   Substance Use Topics    Alcohol use: Not Currently     Comment: rare    Drug use: Not Currently     Types: Cocaine, Marijuana, Methamphetamines, Opiates, Amphetamines, Benzodiazepines, MDMA (Ecstasy)     Comment: last use        Mammogram Screening - Mammogram every 1-2 years updated in Health Maintenance based on mutual decision making        2024   STI Screening   New sexual partner(s) since last STI/HIV test? No        History of abnormal Pap smear: No - age 30- 64 PAP with HPV every 5 years recommended        Latest Ref Rng & Units 2020     4:15 PM 2020     4:12 PM 2016    10:53 AM   PAP / HPV   PAP (Historical)   NIL     HPV 16 DNA NEG^Negative Negative   Negative    HPV 18 DNA NEG^Negative Negative   Negative    Other HR HPV NEG^Negative Negative   Negative      ASCVD Risk   The 10-year ASCVD risk score (Caitie ZEPEDA, et al., 2019) is: 0.4%    Values used to calculate the score:      Age: 39 years      Sex: Female      Is Non- : No      Diabetic: No      Tobacco smoker: No      Systolic Blood Pressure: 116 mmHg      Is BP treated: No      HDL Cholesterol: 48 mg/dL      Total Cholesterol: 156 mg/dL        2024   Contraception/Family Planning   Questions about contraception or family planning No        Reviewed and updated as needed this visit by Provider   Tobacco  Allergies  Meds  Problems  Med Hx  Surg Hx  Fam Hx            Past Medical History:   Diagnosis Date    Chickenpox     Depression     Depressive disorder     Moderate persistent asthma     Prenatal care, subsequent  pregnancy 2022     Past Surgical History:   Procedure Laterality Date    BIOPSY      ENDOSCOPIC BALLOON SINUPLASTY ACCLARENT Bilateral 2/3/2022    Procedure: ENDOSCOPIC EUSTACHAIN TUBE BALLOON DILATION;  Surgeon: Maxx Feliciano MD;  Location: [facility] OR    MOUTH SURGERY      wisdom teeth x 4    MYRINGOTOMY, INSERT TUBE BILATERAL, COMBINED Bilateral 2/3/2022    Procedure: MYRINGOTOMY, BILATERAL, WITH VENTILATION TUBE INSERTION;  Surgeon: Maxx Feliciano MD;  Location: [facility] OR    PE TUBES  3/02/92    SURGICAL HISTORY OF -   03    Excision of cyst on left middle finger    TONSILLECTOMY & ADENOIDECTOMY  1991     OB History    Para Term  AB Living   2 2 2 0 0 2   SAB IAB Ectopic Multiple Live Births   0 0 0 0 2      # Outcome Date GA Lbr Terrance/2nd Weight Sex Type Anes PTL Lv   2 Term 10/22/22 37w6d 06:13 / 00:56 3.085 kg (6 lb 12.8 oz) M Vag-Spont EPI N ANGELA      Name: HANSKARIE-JOY      Apgar1: 6  Apgar5: 7   1 Term 16 39w3d 21:15 / 03:36 3.011 kg (6 lb 10.2 oz) F Vag-Spont EPI, Local  ANGELA      Name: Catia      Apgar1: 7  Apgar5: 8     Labs reviewed in EPIC  BP Readings from Last 3 Encounters:   24 116/60   10/04/23 115/81   23 106/72    Wt Readings from Last 3 Encounters:   24 102.5 kg (226 lb)   10/04/23 93 kg (205 lb)   23 98 kg (216 lb)                  Patient Active Problem List   Diagnosis    Esophageal reflux    CARDIOVASCULAR SCREENING; LDL GOAL LESS THAN 160    Tobacco use disorder    Allergic rhinitis    Encounter for insertion of Mirena IUD    Methamphetamine addiction (H)    Class 2 severe obesity due to excess calories with serious comorbidity in adult (H)     Past Surgical History:   Procedure Laterality Date    BIOPSY      ENDOSCOPIC BALLOON SINUPLASTY ACCLARENT Bilateral 2/3/2022    Procedure: ENDOSCOPIC EUSTACHAIN TUBE BALLOON DILATION;  Surgeon: Maxx Feliciano MD;  Location: [facility]    MOUTH SURGERY      wisdom teeth x 4    MYRINGOTOMY,  INSERT TUBE BILATERAL, COMBINED Bilateral 2/3/2022    Procedure: MYRINGOTOMY, BILATERAL, WITH VENTILATION TUBE INSERTION;  Surgeon: Maxx Feliciano MD;  Location: [facility] OR    PE TUBES  3/02/92    SURGICAL HISTORY OF -   03    Excision of cyst on left middle finger    TONSILLECTOMY & ADENOIDECTOMY  1991       Social History     Tobacco Use    Smoking status: Former     Current packs/day: 0.00     Types: Cigarettes     Quit date: 1998     Years since quittin.4    Smokeless tobacco: Never    Tobacco comments:     0   Substance Use Topics    Alcohol use: Not Currently     Comment: rare     Family History   Problem Relation Age of Onset    Anemia Mother     Depression Mother     Anxiety Disorder Mother     Hypertension Father     Rheumatoid Arthritis Father     Hypertension Maternal Grandmother     Other Cancer Maternal Grandmother         leukemia    Gastrointestinal Disease Maternal Grandmother         diverticulitis    Thyroid Disease Maternal Grandmother     Diabetes Maternal Grandfather     Cerebrovascular Disease Maternal Grandfather     Heart Disease Maternal Grandfather         MI- open heart surgery    Eye Disorder Maternal Grandfather     Depression Maternal Grandfather     Cancer Paternal Grandmother         leukemia    Obesity Paternal Grandmother     Prostate Cancer Paternal Grandfather     Alzheimer Disease Paternal Grandfather     Substance Abuse Paternal Grandfather         alcohol    Substance Abuse Brother         prescription pills    Asthma Sister         Half sister    Diabetes Maternal Uncle     Lupus Other          Current Outpatient Medications   Medication Sig Dispense Refill    acetaminophen (TYLENOL) 325 MG tablet Take 2 tablets (650 mg) by mouth every 6 hours as needed for mild pain Start after Delivery. 100 tablet 0    buPROPion (WELLBUTRIN XL) 300 MG 24 hr tablet       cetirizine (ZYRTEC) 10 MG tablet Take 10 mg by mouth daily PRN      Cholecalciferol (VITAMIN D3) 50 MCG  "(2000 UT) CAPS       FLUoxetine (PROZAC) 20 MG capsule 20 mg.      FLUoxetine (PROZAC) 40 MG capsule       fluticasone (FLONASE) 50 MCG/ACT nasal spray Spray 2 sprays in nostril daily 1 Package 11    levonorgestrel (MIRENA) 20 MCG/DAY IUD 1 each (20 mcg) by Intrauterine route once      naltrexone (DEPADE/REVIA) 50 MG tablet Take 50 mg by mouth daily.      nystatin (MYCOSTATIN) 113075 UNIT/GM external powder Apply topically daily. 60 g 1    omeprazole (PRILOSEC) 40 MG DR capsule Take 1 capsule (40 mg) by mouth daily. 90 capsule 3    topiramate (TOPAMAX) 50 MG tablet       traZODone (DESYREL) 100 MG tablet        Allergies   Allergen Reactions    Sulfa Antibiotics Itching and Swelling         Review of Systems  Constitutional, neuro, ENT, endocrine, pulmonary, cardiac, gastrointestinal, genitourinary, musculoskeletal, integument and psychiatric systems are negative, except as otherwise noted.     Objective    Exam  /60   Pulse 85   Temp 97  F (36.1  C) (Tympanic)   Resp 14   Ht 1.613 m (5' 3.5\")   Wt 102.5 kg (226 lb)   LMP  (LMP Unknown)   SpO2 99%   BMI 39.41 kg/m     Estimated body mass index is 39.41 kg/m  as calculated from the following:    Height as of this encounter: 1.613 m (5' 3.5\").    Weight as of this encounter: 102.5 kg (226 lb).    Physical Exam  GENERAL: alert and no distress  EYES: Eyes grossly normal to inspection, PERRL and conjunctivae and sclerae normal  HENT: ear canals and TM's normal, nose and mouth without ulcers or lesions  NECK: no adenopathy, no asymmetry, masses, or scars  RESP: lungs clear to auscultation - no rales, rhonchi or wheezes  CV: regular rate and rhythm, normal S1 S2, no S3 or S4, no murmur, click or rub, no peripheral edema  ABDOMEN: soft, nontender, no hepatosplenomegaly, no masses and bowel sounds normal   (female) w/bimanual: normal female external genitalia, normal urethral meatus, normal vaginal mucosa, and normal cervix/adnexa/uterus without masses or " discharge IUD strings visualized Pap obtained  MS: no gross musculoskeletal defects noted, no edema  SKIN: no suspicious lesions or rashes  NEURO: Normal strength and tone, mentation intact and speech normal  PSYCH: mentation appears normal, affect normal/bright        Signed Electronically by: NICOLA Pittman CNP    Answers submitted by the patient for this visit:  Patient Health Questionnaire (Submitted on 11/11/2024)  If you checked off any problems, how difficult have these problems made it for you to do your work, take care of things at home, or get along with other people?: Somewhat difficult  PHQ9 TOTAL SCORE: 17

## 2024-11-13 ENCOUNTER — PATIENT OUTREACH (OUTPATIENT)
Dept: CARE COORDINATION | Facility: CLINIC | Age: 39
End: 2024-11-13
Payer: COMMERCIAL

## 2024-11-13 ENCOUNTER — TELEPHONE (OUTPATIENT)
Dept: GASTROENTEROLOGY | Facility: CLINIC | Age: 39
End: 2024-11-13
Payer: COMMERCIAL

## 2024-11-13 LAB
HPV HR 12 DNA CVX QL NAA+PROBE: NEGATIVE
HPV16 DNA CVX QL NAA+PROBE: NEGATIVE
HPV18 DNA CVX QL NAA+PROBE: NEGATIVE
HUMAN PAPILLOMA VIRUS FINAL DIAGNOSIS: NORMAL

## 2024-11-13 NOTE — TELEPHONE ENCOUNTER
"Endoscopy Scheduling Screen      What insurance is in the chart?  Other:  [employer]     Ordering/Referring Provider: Liane Hodges   (If ordering provider performs procedure, schedule with ordering provider unless otherwise instructed. )    BMI: There is no height or weight on file to calculate BMI.  39.41    Sedation Ordered  general anesthesia.   BMI<= 45 45 < BMI <= 48 48 < BMI < = 50  BMI > 50   No Restrictions No [facility]  No [facility]  [facility] with exceptions Hospital Only OR Only       Do you have a history of malignant hyperthermia?  No    (Females) Are you currently pregnant?   No     Have you been diagnosed or told you have pulmonary hypertension?   No    Do you have any heart conditions?  No     Do you have an LVAD?  No    Have you been told you have moderate to severe sleep apnea?  No.    Have you been told you have COPD, asthma, or any other lung disease?  No    Have you ever had or are you waiting for an organ transplant?  No. Continue scheduling, no site restrictions.    Have you had a stroke or transient ischemic attack (TIA aka \"mini stroke\" in the last 6 months?   No    Have you been diagnosed with or been told you have cirrhosis of the liver?   No.    Are you currently on dialysis?   No    Do you need assistance transferring?   No    BMI: There is no height or weight on file to calculate BMI.     Is patients BMI > 50?  No    BMI > 40?  No    Do you have a diagnosis of diabetes?  No    Do you take an injectable medication for weight loss or diabetes (excluding insulin)?  No    Do you take the medication Naltrexone?  Yes Recommended hold time is 3 days. Please consult with you prescribing provider to discuss endoscopy recommendations.    Do you take blood thinners?  No     Prep   Are you currently on dialysis or do you have chronic kidney disease?  No    Do you have a diagnosis of cystic fibrosis (CF)?  No    On a regular basis do you go 3 -5 days between bowel movements?  No    Preferred " Pharmacy:  No Pharmacies Listed    Final Scheduling Details     Procedure scheduled  Upper endoscopy (EGD)    Surgeon:  Margaret     Date of procedure:  12-2-24     Location  [place] - Patient preference.    What is your communication preference for Instructions and/or Bowel Prep?   [email or web address]    Patient Reminders:    You will receive a call from a Nurse to review instructions and health history.  This assessment must be completed prior to your procedure.  Failure to complete the Nurse assessment may result in the procedure being cancelled.       On the day of your procedure, please designate an adult(s) who can drive you home stay with you for the next 24 hours. The medicines used in the exam will make you sleepy. You will not be able to drive.       You cannot take public transportation, ride share services, or non-medical taxi service without a responsible caregiver.  Medical transport services are allowed with the requirement that a responsible caregiver will receive you at your destination.  We require that drivers and caregivers are confirmed prior to your procedure.

## 2024-11-15 ENCOUNTER — PATIENT OUTREACH (OUTPATIENT)
Dept: CARE COORDINATION | Facility: CLINIC | Age: 39
End: 2024-11-15
Payer: COMMERCIAL

## 2024-11-16 LAB
BKR AP ASSOCIATED HPV REPORT: NORMAL
BKR LAB AP GYN ADEQUACY: NORMAL
BKR LAB AP GYN INTERPRETATION: NORMAL
BKR LAB AP PREVIOUS ABNORMAL: NORMAL
PATH REPORT.COMMENTS IMP SPEC: NORMAL
PATH REPORT.COMMENTS IMP SPEC: NORMAL
PATH REPORT.RELEVANT HX SPEC: NORMAL

## 2024-11-29 ENCOUNTER — ANESTHESIA EVENT (OUTPATIENT)
Dept: GASTROENTEROLOGY | Facility: CLINIC | Age: 39
End: 2024-11-29
Payer: COMMERCIAL

## 2024-11-29 ASSESSMENT — LIFESTYLE VARIABLES: TOBACCO_USE: 1

## 2024-11-29 NOTE — ANESTHESIA PREPROCEDURE EVALUATION
Anesthesia Pre-Procedure Evaluation    Patient: Obdulia Black   MRN: [id number] : 1985        Procedure : Procedure(s):  Esophagoscopy, gastroscopy, duodenoscopy (EGD), combined          Past Medical History:   Diagnosis Date    Chickenpox     Depression     Depressive disorder     Moderate persistent asthma     Prenatal care, subsequent pregnancy 2022      Past Surgical History:   Procedure Laterality Date    BIOPSY  2002    ENDOSCOPIC BALLOON SINUPLASTY ACCLARENT Bilateral 2/3/2022    Procedure: ENDOSCOPIC EUSTACHAIN TUBE BALLOON DILATION;  Surgeon: Maxx Feliciano MD;  Location: [facility] OR    MOUTH SURGERY      wisdom teeth x 4    MYRINGOTOMY, INSERT TUBE BILATERAL, COMBINED Bilateral 2/3/2022    Procedure: MYRINGOTOMY, BILATERAL, WITH VENTILATION TUBE INSERTION;  Surgeon: Maxx Feliciano MD;  Location: [facility] OR    PE TUBES  3/02/92    SURGICAL HISTORY OF -   03    Excision of cyst on left middle finger    TONSILLECTOMY & ADENOIDECTOMY  1991      Allergies   Allergen Reactions    Sulfa Antibiotics Itching and Swelling      Social History     Tobacco Use    Smoking status: Former     Current packs/day: 0.00     Types: Cigarettes     Quit date: 1998     Years since quittin.5    Smokeless tobacco: Never    Tobacco comments:     0   Substance Use Topics    Alcohol use: Not Currently     Comment: rare      Wt Readings from Last 1 Encounters:   24 102.5 kg (226 lb)        Anesthesia Evaluation   Pt has had prior anesthetic.         ROS/MED HX  ENT/Pulmonary:     (+)           allergic rhinitis,     tobacco use, Past use,    Moderate Persistent, asthma                  Neurologic:  - neg neurologic ROS     Cardiovascular:  - neg cardiovascular ROS     METS/Exercise Tolerance:     Hematologic:  - neg hematologic  ROS     Musculoskeletal:  - neg musculoskeletal ROS     GI/Hepatic:     (+) GERD,                   Renal/Genitourinary:  - neg Renal ROS     Endo:     (+)                "Obesity,       Psychiatric/Substance Use:     (+)     Recreational drug usage: Meth.    Infectious Disease:  - neg infectious disease ROS     Malignancy:  - neg malignancy ROS     Other:  - neg other ROS          Physical Exam    Airway  airway exam normal      Mallampati: II   TM distance: > 3 FB   Neck ROM: full   Mouth opening: > 3 cm    Respiratory Devices and Support         Dental           Cardiovascular   cardiovascular exam normal       Rhythm and rate: regular and normal     Pulmonary   pulmonary exam normal        breath sounds clear to auscultation           OUTSIDE LABS:  CBC:   Lab Results   Component Value Date    WBC 9.2 11/12/2024    WBC 6.9 07/13/2023    HGB 12.9 11/12/2024    HGB 13.7 07/13/2023    HCT 40.4 11/12/2024    HCT 42.5 07/13/2023     11/12/2024     07/13/2023     BMP:   Lab Results   Component Value Date     11/12/2024     07/13/2023    POTASSIUM 3.9 11/12/2024    POTASSIUM 3.8 07/13/2023    CHLORIDE 106 11/12/2024    CHLORIDE 107 07/13/2023    CO2 25 11/12/2024    CO2 24 07/13/2023    BUN 11.3 11/12/2024    BUN 10.3 07/13/2023    CR 0.81 11/12/2024    CR 0.96 (H) 07/13/2023    GLC 93 11/12/2024    GLC 97 07/13/2023     COAGS: No results found for: \"PTT\", \"INR\", \"FIBR\"  POC:   Lab Results   Component Value Date    HCG Negative 01/27/2022     HEPATIC:   Lab Results   Component Value Date    ALBUMIN 4.0 11/12/2024    PROTTOTAL 6.9 11/12/2024    ALT 14 11/12/2024    AST 17 11/12/2024    ALKPHOS 87 11/12/2024    BILITOTAL <0.2 11/12/2024     OTHER:   Lab Results   Component Value Date    ROBBI 8.9 11/12/2024    LIPASE 23 10/05/2022    AMYLASE 35 10/05/2022    TSH 5.68 (H) 11/12/2024    T4 0.95 11/12/2024    CRP 3.8 07/14/2020    SED 7 07/14/2020       Anesthesia Plan    ASA Status:  2    NPO Status:  NPO Appropriate    Anesthesia Type: General.   Induction: Intravenous, Propofol.   Maintenance: TIVA.        Consents    Anesthesia Plan(s) and associated risks, " "benefits, and realistic alternatives discussed. Questions answered and patient/representative(s) expressed understanding.     - Discussed: Risks, Benefits and Alternatives for BOTH SEDATION and the PROCEDURE were discussed     - Discussed with:  Patient       - Patient is DNR/DNI Status: No     Use of blood products discussed: No .     Postoperative Care            Comments:               NICOLA Acevedo CRNA    I have reviewed the pertinent notes and labs in the chart from the past 30 days and (re)examined the patient.  Any updates or changes from those notes are reflected in this note.                         # Obesity: Estimated body mass index is 39.41 kg/m  as calculated from the following:    Height as of 11/12/24: 1.613 m (5' 3.5\").    Weight as of 11/12/24: 102.5 kg (226 lb).             "

## 2024-12-02 ENCOUNTER — ANESTHESIA (OUTPATIENT)
Dept: GASTROENTEROLOGY | Facility: CLINIC | Age: 39
End: 2024-12-02
Payer: COMMERCIAL

## 2024-12-02 ENCOUNTER — HOSPITAL ENCOUNTER (OUTPATIENT)
Facility: CLINIC | Age: 39
Discharge: HOME | End: 2024-12-02
Attending: SURGERY | Admitting: SURGERY
Payer: COMMERCIAL

## 2024-12-02 VITALS
HEIGHT: 63 IN | RESPIRATION RATE: 15 BRPM | SYSTOLIC BLOOD PRESSURE: 101 MMHG | DIASTOLIC BLOOD PRESSURE: 70 MMHG | WEIGHT: 226 LBS | OXYGEN SATURATION: 93 % | HEART RATE: 82 BPM | TEMPERATURE: 98.3 F | BODY MASS INDEX: 40.04 KG/M2

## 2024-12-02 LAB — UPPER GI ENDOSCOPY: NORMAL

## 2024-12-02 PROCEDURE — 250N000009 HC RX 250: Performed by: NURSE ANESTHETIST, CERTIFIED REGISTERED

## 2024-12-02 PROCEDURE — 43235 EGD DIAGNOSTIC BRUSH WASH: CPT | Performed by: SURGERY

## 2024-12-02 PROCEDURE — 370N000017 HC ANESTHESIA TECHNICAL FEE, PER MIN: Performed by: SURGERY

## 2024-12-02 PROCEDURE — 250N000011 HC RX IP 250 OP 636: Performed by: NURSE ANESTHETIST, CERTIFIED REGISTERED

## 2024-12-02 RX ORDER — LIDOCAINE 40 MG/G
CREAM TOPICAL
Status: DISCONTINUED | OUTPATIENT
Start: 2024-12-02 | End: 2024-12-02 | Stop reason: HOSPADM

## 2024-12-02 RX ORDER — SODIUM CHLORIDE, SODIUM LACTATE, POTASSIUM CHLORIDE, CALCIUM CHLORIDE 600; 310; 30; 20 MG/100ML; MG/100ML; MG/100ML; MG/100ML
INJECTION, SOLUTION INTRAVENOUS CONTINUOUS
Status: DISCONTINUED | OUTPATIENT
Start: 2024-12-02 | End: 2024-12-02 | Stop reason: HOSPADM

## 2024-12-02 RX ORDER — PROPOFOL 10 MG/ML
INJECTION, EMULSION INTRAVENOUS CONTINUOUS PRN
Status: DISCONTINUED | OUTPATIENT
Start: 2024-12-02 | End: 2024-12-02

## 2024-12-02 RX ORDER — LIDOCAINE HYDROCHLORIDE 20 MG/ML
INJECTION, SOLUTION INFILTRATION; PERINEURAL PRN
Status: DISCONTINUED | OUTPATIENT
Start: 2024-12-02 | End: 2024-12-02

## 2024-12-02 RX ORDER — PROPOFOL 10 MG/ML
INJECTION, EMULSION INTRAVENOUS PRN
Status: DISCONTINUED | OUTPATIENT
Start: 2024-12-02 | End: 2024-12-02

## 2024-12-02 RX ADMIN — PROPOFOL 100 MG: 10 INJECTION, EMULSION INTRAVENOUS at 11:44

## 2024-12-02 RX ADMIN — LIDOCAINE HYDROCHLORIDE 80 MG: 20 INJECTION, SOLUTION INFILTRATION; PERINEURAL at 11:44

## 2024-12-02 RX ADMIN — PROPOFOL 200 MCG/KG/MIN: 10 INJECTION, EMULSION INTRAVENOUS at 11:44

## 2024-12-02 ASSESSMENT — ACTIVITIES OF DAILY LIVING (ADL)
ADLS_ACUITY_SCORE: 46
ADLS_ACUITY_SCORE: 46

## 2024-12-02 NOTE — ANESTHESIA CARE TRANSFER NOTE
Patient: Obdulia Black    Procedure: Procedure(s):  Esophagoscopy, gastroscopy, duodenoscopy (EGD), combined       Diagnosis: GERD without esophagitis [K21.9]  Diagnosis Additional Information: No value filed.    Anesthesia Type:   General     Note:    Oropharynx: oropharynx clear of all foreign objects  Level of Consciousness: drowsy  Oxygen Supplementation: room air    Independent Airway: airway patency satisfactory and stable  Dentition: dentition unchanged  Vital Signs Stable: post-procedure vital signs reviewed and stable  Report to RN Given: handoff report given  Patient transferred to: Phase II    Handoff Report: Identifed the Patient, Identified the Reponsible Provider, Reviewed the pertinent medical history, Discussed the surgical course, Reviewed Intra-OP anesthesia mangement and issues during anesthesia, Set expectations for post-procedure period and Allowed opportunity for questions and acknowledgement of understanding      Vitals:  Vitals Value Taken Time   /72 12/02/24 1156   Temp     Pulse 83 12/02/24 1156   Resp     SpO2 94 % 12/02/24 1159   Vitals shown include unfiled device data.    Electronically Signed By: NICOLA Mckenzie CRNA  December 2, 2024  12:00 PM

## 2024-12-02 NOTE — ANESTHESIA POSTPROCEDURE EVALUATION
Patient: Obdulia Black    Procedure: Procedure(s):  Esophagoscopy, gastroscopy, duodenoscopy (EGD), combined       Anesthesia Type:  General    Note:  Disposition: Outpatient   Postop Pain Control: Uneventful            Sign Out: Well controlled pain   PONV: No   Neuro/Psych: Uneventful            Sign Out: Acceptable/Baseline neuro status   Airway/Respiratory: Uneventful            Sign Out: Acceptable/Baseline resp. status   CV/Hemodynamics: Uneventful            Sign Out: Acceptable CV status; No obvious hypovolemia; No obvious fluid overload   Other NRE: NONE   DID A NON-ROUTINE EVENT OCCUR? No           Last vitals:  Vitals Value Taken Time   /81 12/02/24 1215   Temp     Pulse 86 12/02/24 1215   Resp 15 12/02/24 1200   SpO2 99 % 12/02/24 1225   Vitals shown include unfiled device data.    Electronically Signed By: NICOLA Gilliam CRNA  December 2, 2024  4:24 PM

## 2024-12-02 NOTE — INTERVAL H&P NOTE
"I have reviewed the surgical (or preoperative) H&P that is linked to this encounter, and examined the patient. There are no significant changes    EGD 2/2 reflux; omeprazole 40mg; no blood thinner    Clinical Conditions Present on Arrival:  Clinically Significant Risk Factors Present on Admission                       # Severe Obesity: Estimated body mass index is 40.03 kg/m  as calculated from the following:    Height as of this encounter: 1.6 m (5' 3\").    Weight as of this encounter: 102.5 kg (226 lb).       "

## 2025-05-06 ENCOUNTER — OFFICE VISIT (OUTPATIENT)
Dept: FAMILY MEDICINE | Facility: CLINIC | Age: 40
End: 2025-05-06

## 2025-05-06 VITALS
OXYGEN SATURATION: 99 % | DIASTOLIC BLOOD PRESSURE: 76 MMHG | HEIGHT: 63 IN | RESPIRATION RATE: 14 BRPM | BODY MASS INDEX: 34.2 KG/M2 | HEART RATE: 106 BPM | WEIGHT: 193 LBS | TEMPERATURE: 97.3 F | SYSTOLIC BLOOD PRESSURE: 114 MMHG

## 2025-05-06 DIAGNOSIS — L23.9 ALLERGIC CONTACT DERMATITIS, UNSPECIFIED TRIGGER: Primary | ICD-10-CM

## 2025-05-06 PROBLEM — F15.20 METHAMPHETAMINE ADDICTION (H): Status: RESOLVED | Noted: 2023-06-29 | Resolved: 2025-05-06

## 2025-05-06 PROBLEM — E66.01 CLASS 2 SEVERE OBESITY DUE TO EXCESS CALORIES WITH SERIOUS COMORBIDITY IN ADULT (H): Status: RESOLVED | Noted: 2023-10-04 | Resolved: 2025-05-06

## 2025-05-06 PROBLEM — E66.812 CLASS 2 SEVERE OBESITY DUE TO EXCESS CALORIES WITH SERIOUS COMORBIDITY IN ADULT (H): Status: RESOLVED | Noted: 2023-10-04 | Resolved: 2025-05-06

## 2025-05-06 PROCEDURE — 99214 OFFICE O/P EST MOD 30 MIN: CPT | Performed by: NURSE PRACTITIONER

## 2025-05-06 RX ORDER — CETIRIZINE HYDROCHLORIDE 10 MG/1
10 TABLET ORAL DAILY
COMMUNITY
Start: 2025-05-06

## 2025-05-06 RX ORDER — PREDNISONE 20 MG/1
TABLET ORAL
Qty: 20 TABLET | Refills: 0 | Status: SHIPPED | OUTPATIENT
Start: 2025-05-06

## 2025-05-06 ASSESSMENT — PATIENT HEALTH QUESTIONNAIRE - PHQ9
SUM OF ALL RESPONSES TO PHQ QUESTIONS 1-9: 9
SUM OF ALL RESPONSES TO PHQ QUESTIONS 1-9: 9
10. IF YOU CHECKED OFF ANY PROBLEMS, HOW DIFFICULT HAVE THESE PROBLEMS MADE IT FOR YOU TO DO YOUR WORK, TAKE CARE OF THINGS AT HOME, OR GET ALONG WITH OTHER PEOPLE: SOMEWHAT DIFFICULT

## 2025-05-06 ASSESSMENT — PAIN SCALES - GENERAL: PAINLEVEL_OUTOF10: NO PAIN (0)

## 2025-05-06 NOTE — PATIENT INSTRUCTIONS
Thank you for coming to see me today! Here are a couple of pieces of information about messages and lab communication practices.     If lab work was done today as part of your evaluation you will generally be contacted via [employer], mail, or phone with the results within 7-10 days.  If there is an alarming/concerning result we will contact you by phone. Lab results come back at varying times, I generally wait until all labs are resulted before making comments on results. Please note, labs are automatically released to [employer] once available, but it may take a couple of days for my interpretation note to appear.     I try very hard to respond to medical messages with 2 business days of receiving them. Occasionally it takes me longer if I am trying to figure out the best way to respond and need to seek guidance, do some research or dig deeper into your medical history to come up with a helpful response.     If you need refills please contact your pharmacist. They will send a refill request to me to review. Please allow 3-5 business days for us to respond to all refill requests.     Please call or send a medical message with any questions or concerns. Thank you for trusting me to be part of your healthcare team!

## 2025-05-06 NOTE — PROGRESS NOTES
Assessment & Plan     Allergic contact dermatitis, unspecified trigger  Symptomatic care strategies reviewed  Continue home OTC remedies zyrtec, hydrocortisone, benadryl  Could add in famotidine OTC   Could add oatmeal bath   Begin   - predniSONE (DELTASONE) 20 MG tablet; Take 3 tabs by mouth daily x 3 days, then 2 tabs daily x 3 days, then 1 tab daily x 3 days, then 1/2 tab daily x 3 days.        Call or return to the clinic with any worsening of symptoms or no resolution. Patient/Parent verbalized understanding and is in agreement. Medication side effects reviewed.   Current Outpatient Medications   Medication Sig Dispense Refill    cetirizine (ZYRTEC) 10 MG tablet Take 1 tablet (10 mg) by mouth daily. PRN      levonorgestrel (MIRENA) 20 MCG/DAY IUD 1 each (20 mcg) by Intrauterine route once      omeprazole (PRILOSEC) 40 MG DR capsule Take 1 capsule (40 mg) by mouth daily. 90 capsule 3    predniSONE (DELTASONE) 20 MG tablet Take 3 tabs by mouth daily x 3 days, then 2 tabs daily x 3 days, then 1 tab daily x 3 days, then 1/2 tab daily x 3 days. 20 tablet 0    traZODone (DESYREL) 100 MG tablet        Chart documentation with Dragon Voice recognition Software. Although reviewed after completion, some words and grammatical errors may remain.  Liane Hodges MSN,FNP-[address] [address]  [phone]          Salinas Steiner is a 40 year old, presenting for the following health issues:  Rash        5/6/2025     9:39 AM   Additional Questions   Roomed by Krista     History of Present Illness       Reason for visit:  Hives all over bad itching  Symptom onset:  3-7 days ago  Symptoms include:  Hives itching swelling  Symptom intensity:  Moderate  Symptom progression:  Worsening  Had these symptoms before:  Yes  Has tried/received treatment for these symptoms:  Yes  Previous treatment was successful:  Yes  Prior treatment description:  I think i was put on  "a steroid  What makes it worse:  No  What makes it better:  No She is missing 7 dose(s) of medications per week.  She is not taking prescribed medications regularly due to cost of medication.         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      Objective    /76   Pulse 106   Temp 97.3  F (36.3  C) (Tympanic)   Resp 14   Ht 1.6 m (5' 3\")   Wt 87.5 kg (193 lb)   SpO2 99%   BMI 34.19 kg/m    Body mass index is 34.19 kg/m .  Physical Exam   GENERAL: alert and no distress  EYES: Eyes grossly normal to inspection, PERRL and conjunctivae and sclerae normal  HENT: ear canals and TM's normal, nose and mouth without ulcers or lesions  NECK: no adenopathy, no asymmetry, masses, or scars  RESP: lungs clear to auscultation - no rales, rhonchi or wheezes  CV: regular rate and rhythm, normal S1 S2, no S3 or S4, no murmur, click or rub, no peripheral edema  ABDOMEN: soft, nontender, no hepatosplenomegaly, no masses and bowel sounds normal  MS: no gross musculoskeletal defects noted, no edema  SKIN: hive UE and LE bilaterally with erythema and areas of excoriation   NEURO: Normal strength and tone, mentation intact and speech normal  PSYCH: mentation appears normal, affect normal/bright          Signed Electronically by: NICOLA Pittman CNP    "

## (undated) DEVICE — GLOVE PROTEXIS W/NEU-THERA 7.5  2D73TE75

## (undated) DEVICE — DRSG COTTON BALL 6PK LCB62

## (undated) DEVICE — Device

## (undated) DEVICE — BLADE KNIFE BEAVER MYRINGOTOMY 7121

## (undated) DEVICE — SOL NACL 0.9% IRRIG 1000ML BOTTLE 07138-09

## (undated) DEVICE — TUBE EAR DURAVENT 1.27MM SIL 240075

## (undated) DEVICE — TUBING SUCTION 12"X1/4" N612

## (undated) DEVICE — DRSG GAUZE 4X4" 3033

## (undated) RX ORDER — ACETAMINOPHEN 325 MG/1
TABLET ORAL
Status: DISPENSED
Start: 2022-02-03

## (undated) RX ORDER — ONDANSETRON 4 MG/1
TABLET, ORALLY DISINTEGRATING ORAL
Status: DISPENSED
Start: 2022-02-03

## (undated) RX ORDER — HYDROMORPHONE HCL IN WATER/PF 6 MG/30 ML
PATIENT CONTROLLED ANALGESIA SYRINGE INTRAVENOUS
Status: DISPENSED
Start: 2022-02-03

## (undated) RX ORDER — FENTANYL CITRATE 50 UG/ML
INJECTION, SOLUTION INTRAMUSCULAR; INTRAVENOUS
Status: DISPENSED
Start: 2022-02-03

## (undated) RX ORDER — DEXAMETHASONE SODIUM PHOSPHATE 4 MG/ML
INJECTION, SOLUTION INTRA-ARTICULAR; INTRALESIONAL; INTRAMUSCULAR; INTRAVENOUS; SOFT TISSUE
Status: DISPENSED
Start: 2022-02-03

## (undated) RX ORDER — PROPOFOL 10 MG/ML
INJECTION, EMULSION INTRAVENOUS
Status: DISPENSED
Start: 2022-02-03

## (undated) RX ORDER — KETOROLAC TROMETHAMINE 15 MG/ML
INJECTION, SOLUTION INTRAMUSCULAR; INTRAVENOUS
Status: DISPENSED
Start: 2022-02-03

## (undated) RX ORDER — ONDANSETRON 2 MG/ML
INJECTION INTRAMUSCULAR; INTRAVENOUS
Status: DISPENSED
Start: 2022-02-03

## (undated) RX ORDER — OXYMETAZOLINE HYDROCHLORIDE 0.05 G/100ML
SPRAY NASAL
Status: DISPENSED
Start: 2022-02-03

## (undated) RX ORDER — HYDROMORPHONE HYDROCHLORIDE 1 MG/ML
INJECTION, SOLUTION INTRAMUSCULAR; INTRAVENOUS; SUBCUTANEOUS
Status: DISPENSED
Start: 2022-02-03

## (undated) RX ORDER — LIDOCAINE HYDROCHLORIDE 10 MG/ML
INJECTION, SOLUTION EPIDURAL; INFILTRATION; INTRACAUDAL; PERINEURAL
Status: DISPENSED
Start: 2022-02-03

## (undated) RX ORDER — OXYCODONE HYDROCHLORIDE 5 MG/1
TABLET ORAL
Status: DISPENSED
Start: 2022-02-03

## (undated) RX ORDER — DEXMEDETOMIDINE HYDROCHLORIDE 100 UG/ML
INJECTION, SOLUTION INTRAVENOUS
Status: DISPENSED
Start: 2022-10-22

## (undated) RX ORDER — OFLOXACIN 3 MG/ML
SOLUTION/ DROPS OPHTHALMIC
Status: DISPENSED
Start: 2022-02-03

## (undated) RX ORDER — HYDROXYZINE HYDROCHLORIDE 50 MG/1
TABLET, FILM COATED ORAL
Status: DISPENSED
Start: 2022-02-03